# Patient Record
Sex: MALE | Race: WHITE | NOT HISPANIC OR LATINO
[De-identification: names, ages, dates, MRNs, and addresses within clinical notes are randomized per-mention and may not be internally consistent; named-entity substitution may affect disease eponyms.]

---

## 2019-09-10 ENCOUNTER — FORM ENCOUNTER (OUTPATIENT)
Age: 74
End: 2019-09-10

## 2020-01-02 ENCOUNTER — FORM ENCOUNTER (OUTPATIENT)
Age: 75
End: 2020-01-02

## 2020-01-09 ENCOUNTER — FORM ENCOUNTER (OUTPATIENT)
Age: 75
End: 2020-01-09

## 2020-04-02 ENCOUNTER — FORM ENCOUNTER (OUTPATIENT)
Age: 75
End: 2020-04-02

## 2020-12-27 ENCOUNTER — FORM ENCOUNTER (OUTPATIENT)
Age: 75
End: 2020-12-27

## 2020-12-28 ENCOUNTER — TRANSCRIPTION ENCOUNTER (OUTPATIENT)
Age: 75
End: 2020-12-28

## 2020-12-28 ENCOUNTER — APPOINTMENT (OUTPATIENT)
Dept: CT IMAGING | Facility: CLINIC | Age: 75
End: 2020-12-28
Payer: MEDICARE

## 2020-12-28 PROCEDURE — 74174 CTA ABD&PLVS W/CONTRAST: CPT | Mod: MH

## 2020-12-28 PROCEDURE — Q9967B: CUSTOM

## 2020-12-28 PROCEDURE — 82565A: CUSTOM | Mod: QW

## 2021-01-04 ENCOUNTER — FORM ENCOUNTER (OUTPATIENT)
Age: 76
End: 2021-01-04

## 2022-01-11 ENCOUNTER — FORM ENCOUNTER (OUTPATIENT)
Age: 77
End: 2022-01-11

## 2022-01-12 ENCOUNTER — APPOINTMENT (OUTPATIENT)
Dept: ULTRASOUND IMAGING | Facility: CLINIC | Age: 77
End: 2022-01-12
Payer: MEDICARE

## 2022-01-12 PROCEDURE — 76775 US EXAM ABDO BACK WALL LIM: CPT

## 2022-01-24 ENCOUNTER — FORM ENCOUNTER (OUTPATIENT)
Age: 77
End: 2022-01-24

## 2022-04-04 ENCOUNTER — FORM ENCOUNTER (OUTPATIENT)
Age: 77
End: 2022-04-04

## 2022-04-13 ENCOUNTER — APPOINTMENT (OUTPATIENT)
Dept: ORTHOPEDIC SURGERY | Facility: CLINIC | Age: 77
End: 2022-04-13
Payer: OTHER MISCELLANEOUS

## 2022-04-13 VITALS — HEIGHT: 75 IN | WEIGHT: 255 LBS | BODY MASS INDEX: 31.71 KG/M2

## 2022-04-13 DIAGNOSIS — E78.00 PURE HYPERCHOLESTEROLEMIA, UNSPECIFIED: ICD-10-CM

## 2022-04-13 PROCEDURE — 20552 NJX 1/MLT TRIGGER POINT 1/2: CPT

## 2022-04-13 PROCEDURE — 99213 OFFICE O/P EST LOW 20 MIN: CPT | Mod: 25

## 2022-04-13 PROCEDURE — 99072 ADDL SUPL MATRL&STAF TM PHE: CPT

## 2022-04-13 NOTE — HISTORY OF PRESENT ILLNESS
[Lower back] : lower back [Work related] : work related [Sudden] : sudden [8] : 8 [Burning] : burning [Shooting] : shooting [Stabbing] : stabbing [Tightness] : tightness [Tingling] : tingling [Sleep] : sleep [Nothing helps with pain getting better] : Nothing helps with pain getting better [Retired] : Work status: retired [] : yes [de-identified] : Patient Complaint - WC 3/3/95 LBP to right leg\par Pt states he feels about the same. He has good days and bad days in terms of pain. Worse with sitting/standing\par prolomged time\par 1/19/11: pt presents f/u lower back pain, radiculopathy, doing well with home therapy and use of Vicodin.\par 4/13/11 status quo. still with intermittant back pain and radiculopathy. taking norco prn.\par 8/31/11 c/o stiffness, hep\par 1/4/12: f/u lower back pain, no significant changes. Using Naprosyn PRN, walking for exercise. Pt notes he needs a knee\par replacement but is trying to strengthen it first.\par 4/4/12 f/u lbp walks 1 mile but limited by knee pain\par 8/1/12 f/u lbp, walks regularly\par 11/7/12 f/u lbp rad to R le, walks regularly. Requesting Norco 7.5/325\par 2/6/13: f/u lower back, notes cont pain. Not doing much HEP due to his knee hurting and colder weather.\par 9/11/13 f/u lbp. Had tka much improved\par 12/11/13: f/u LBP, notes cont pain, stiffness L>R. Some radicular pain into legs. Walking for exercise. Using Naprosyn for\par pain.\par 3/12/14 f/u lbp Rides bike\par 6/25/14 f/u lbp hep, takes Naprosyn\par 12/3/14 f/u lbp had 2 exacerbation 2 weeks ago getting off couch. Takes Aleve\par 10/14/15: f/u lbp. numbness right leg. medications.\par 1/27/16 f/u R sided lbp. Requesting patches.\par 5/4/16: f/u R sided low back pain, exacerbation of pain 3 wks ago that resolved. No shooting pains down the legs.\par Lidoderm not approved.\par 8/10/16 f/u lbp. 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 Requ Lidoderm\par 11/7/18 f/u LBP L > R Lidoderm/Naprosyn\par 11/13/19: f/u LBP good relief with Lidoderm patch. Prostate issues and undergoing radiation treatment\par 2/12/2020: f/u lbp.\par 7/8/2020: f/u lbp. stable.\par 10/14/2020: f/u lbp. status quo\par 1/27/21 f/u lbp no changes\par 4/21/21: f/u lbp. no changes.\par 7/14/21: f/u lbp. a couple recent flare-ups, aggravated by damp weather. No new injury. Non radiating. Lidoderm patch and Naprosyn helped. He has been trying to ride recumbent bike.\par 10/13/21: f/u lbp.\par 4/13/22: f/u lbp. 3 weeks ago he was coughing when he "threw" his back out. He continues to have persistent back pain radiating down his left leg. He saw his Vascular Surgeon and was prescribed mobic. He does not feel it is helping.  [FreeTextEntry3] : 3-3-95 [FreeTextEntry5] : back went out a few weeks ago [FreeTextEntry7] : left leg [de-identified] : getting  up in the AM

## 2022-04-13 NOTE — PROCEDURE
[Trigger point 1-2 muscle groups] : trigger point 1-2 muscle groups [Left] : of the left [Lumbar paraspinal muscle] : lumbar paraspinal muscle [Pain] : pain [Inflammation] : inflammation [Alcohol] : alcohol [Sterile technique used] : sterile technique used [___ cc    3mg] :  Betamethasone (Celestone) ~Vcc of 3mg [___ cc    1%] : Lidocaine ~Vcc of 1%  [___ cc    0.25%] : Bupivacaine (Marcaine) ~Vcc of 0.25%  [] : Patient tolerated procedure well [Previous OTC use and PT nontherapeutic] : patient has tried OTC's including aspirin, Ibuprofen, Aleve, etc or prescription NSAIDS, and/or exercises at home and/or physical therapy without satisfactory response

## 2022-04-13 NOTE — PHYSICAL EXAM
[Flexion] : flexion [Extension] : extension [] : positive straight leg raise [4___] : left dorsiflexors 4[unfilled]/5

## 2022-04-13 NOTE — WORK
[Total] : total [Does not reveal pre-existing condition(s) that may affect treatment/prognosis] : does not reveal pre-existing condition(s) that may affect treatment/prognosis [Cane] : cane [N/A] : : Not Applicable [No Rx restrictions] : No Rx restrictions. [I provided the services listed above] :  I provided the services listed above. [FreeTextEntry1] : retired disability

## 2022-04-13 NOTE — DISCUSSION/SUMMARY
[de-identified] : TPI today left paraspinal lumbar musculature\par Recommend MDP and skelaxin.\par Advised not to take any nsaids while taking MDP. \par Will get an updated mri for further evaluation of hnp/Stenosis

## 2022-05-18 ENCOUNTER — APPOINTMENT (OUTPATIENT)
Dept: ORTHOPEDIC SURGERY | Facility: CLINIC | Age: 77
End: 2022-05-18
Payer: OTHER MISCELLANEOUS

## 2022-05-18 VITALS — WEIGHT: 255 LBS | BODY MASS INDEX: 31.71 KG/M2 | HEIGHT: 75 IN

## 2022-05-18 PROCEDURE — 99214 OFFICE O/P EST MOD 30 MIN: CPT

## 2022-05-18 PROCEDURE — 99072 ADDL SUPL MATRL&STAF TM PHE: CPT

## 2022-05-18 NOTE — WORK
[Total] : total [Cannot return to work because ________] : cannot return to work because [unfilled] [FreeTextEntry1] : poor

## 2022-05-18 NOTE — HISTORY OF PRESENT ILLNESS
[Lower back] : lower back [Gradual] : gradual [Burning] : burning [Radiating] : radiating [Shooting] : shooting [Stabbing] : stabbing [Constant] : constant [Sitting] : sitting [Standing] : standing [Walking] : walking [Work related] : work related [Sudden] : sudden [8] : 8 [Tightness] : tightness [Tingling] : tingling [Sleep] : sleep [Nothing helps with pain getting better] : Nothing helps with pain getting better [Retired] : Work status: retired [] : yes [de-identified] : Patient Complaint - WC 3/3/95 LBP to right leg\par Pt states he feels about the same. He has good days and bad days in terms of pain. Worse with sitting/standing\par prolomged time\par 1/19/11: pt presents f/u lower back pain, radiculopathy, doing well with home therapy and use of Vicodin.\par 4/13/11 status quo. still with intermittant back pain and radiculopathy. taking norco prn.\par 8/31/11 c/o stiffness, hep\par 1/4/12: f/u lower back pain, no significant changes. Using Naprosyn PRN, walking for exercise. Pt notes he needs a knee\par replacement but is trying to strengthen it first.\par 4/4/12 f/u lbp walks 1 mile but limited by knee pain\par 8/1/12 f/u lbp, walks regularly\par 11/7/12 f/u lbp rad to R le, walks regularly. Requesting Norco 7.5/325\par 2/6/13: f/u lower back, notes cont pain. Not doing much HEP due to his knee hurting and colder weather.\par 9/11/13 f/u lbp. Had tka much improved\par 12/11/13: f/u LBP, notes cont pain, stiffness L>R. Some radicular pain into legs. Walking for exercise. Using Naprosyn for\par pain.\par 3/12/14 f/u lbp Rides bike\par 6/25/14 f/u lbp hep, takes Naprosyn\par 12/3/14 f/u lbp had 2 exacerbation 2 weeks ago getting off couch. Takes Aleve\par 10/14/15: f/u lbp. numbness right leg. medications.\par 1/27/16 f/u R sided lbp. Requesting patches.\par 5/4/16: f/u R sided low back pain, exacerbation of pain 3 wks ago that resolved. No shooting pains down the legs.\par Lidoderm not approved.\par 8/10/16 f/u lbp. 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 Requ Lidoderm\par 11/7/18 f/u LBP L > R Lidoderm/Naprosyn\par 11/13/19: f/u LBP good relief with Lidoderm patch. Prostate issues and undergoing radiation treatment\par 2/12/2020: f/u lbp.\par 7/8/2020: f/u lbp. stable.\par 10/14/2020: f/u lbp. status quo\par 1/27/21 f/u lbp no changes\par 4/21/21: f/u lbp. no changes.\par 7/14/21: f/u lbp. a couple recent flare-ups, aggravated by damp weather. No new injury. Non radiating. Lidoderm patch and Naprosyn helped. He has been trying to ride recumbent bike.\par 10/13/21: f/u lbp.\par 4/13/22: f/u lbp. 3 weeks ago he was coughing when he "threw" his back out. He continues to have persistent back pain radiating down his left leg. He saw his Vascular Surgeon and was prescribed mobic. He does not feel it is helping. \par 5/18/22  f/u Lbp to L ant thigh [FreeTextEntry3] : 3-3-95 [FreeTextEntry5] : back went out a few weeks ago [FreeTextEntry7] : left leg [FreeTextEntry9] : tylenol every 6 hours [de-identified] : getting  up in the AM [de-identified] : stand up MRI 5-13-22 [de-identified] : cortiosne injection helped

## 2022-05-18 NOTE — PHYSICAL EXAM
[Flexion] : flexion [Extension] : extension [4___] : left dorsiflexors 4[unfilled]/5 [] : positive straight leg raise

## 2022-05-18 NOTE — DATA REVIEWED
[MRI] : MRI [Lumbar Spine] : lumbar spine [Report was reviewed and noted in the chart] : The report was reviewed and noted in the chart [I reviewed the films/CD and agree] : I reviewed the films/CD and agree [FreeTextEntry1] : g1 spondylolisthesis L4-5, mild stenosis L3-4

## 2022-06-09 ENCOUNTER — APPOINTMENT (OUTPATIENT)
Dept: PAIN MANAGEMENT | Facility: CLINIC | Age: 77
End: 2022-06-09

## 2022-06-09 ENCOUNTER — RX RENEWAL (OUTPATIENT)
Age: 77
End: 2022-06-09

## 2022-06-13 ENCOUNTER — FORM ENCOUNTER (OUTPATIENT)
Age: 77
End: 2022-06-13

## 2022-06-14 ENCOUNTER — APPOINTMENT (OUTPATIENT)
Dept: ULTRASOUND IMAGING | Facility: CLINIC | Age: 77
End: 2022-06-14
Payer: MEDICARE

## 2022-06-14 PROCEDURE — 76775 US EXAM ABDO BACK WALL LIM: CPT

## 2022-06-20 ENCOUNTER — FORM ENCOUNTER (OUTPATIENT)
Age: 77
End: 2022-06-20

## 2022-06-27 ENCOUNTER — APPOINTMENT (OUTPATIENT)
Dept: ORTHOPEDIC SURGERY | Facility: CLINIC | Age: 77
End: 2022-06-27

## 2022-07-11 ENCOUNTER — FORM ENCOUNTER (OUTPATIENT)
Age: 77
End: 2022-07-11

## 2022-07-12 ENCOUNTER — APPOINTMENT (OUTPATIENT)
Dept: CT IMAGING | Facility: CLINIC | Age: 77
End: 2022-07-12

## 2022-07-12 PROCEDURE — 71250 CT THORAX DX C-: CPT | Mod: MH

## 2022-09-07 ENCOUNTER — APPOINTMENT (OUTPATIENT)
Dept: ORTHOPEDIC SURGERY | Facility: CLINIC | Age: 77
End: 2022-09-07

## 2022-09-07 VITALS — BODY MASS INDEX: 31.71 KG/M2 | WEIGHT: 255 LBS | HEIGHT: 75 IN

## 2022-09-07 PROCEDURE — 99072 ADDL SUPL MATRL&STAF TM PHE: CPT

## 2022-09-07 PROCEDURE — 99213 OFFICE O/P EST LOW 20 MIN: CPT

## 2022-09-07 NOTE — REVIEW OF SYSTEMS
[Joint Pain] : joint pain [Joint Stiffness] : joint stiffness [Joint Swelling] : joint swelling [Muscle Weakness] : muscle weakness [Negative] : Heme/Lymph [Decrease Hearing] : decrease hearing

## 2022-09-07 NOTE — HISTORY OF PRESENT ILLNESS
[Lower back] : lower back [Work related] : work related [Gradual] : gradual [Sudden] : sudden [Burning] : burning [Shooting] : shooting [Stabbing] : stabbing [Tightness] : tightness [Tingling] : tingling [Constant] : constant [Sleep] : sleep [Sitting] : sitting [Standing] : standing [Retired] : Work status: retired [3] : 3 [Localized] : localized [Meds] : meds [Injection therapy] : injection therapy [de-identified] : Patient Complaint - WC 3/3/95 LBP to right leg\par Pt states he feels about the same. He has good days and bad days in terms of pain. Worse with sitting/standing\par prolomged time\par 1/19/11: pt presents f/u lower back pain, radiculopathy, doing well with home therapy and use of Vicodin.\par 4/13/11 status quo. still with intermittant back pain and radiculopathy. taking norco prn.\par 8/31/11 c/o stiffness, hep\par 1/4/12: f/u lower back pain, no significant changes. Using Naprosyn PRN, walking for exercise. Pt notes he needs a knee\par replacement but is trying to strengthen it first.\par 4/4/12 f/u lbp walks 1 mile but limited by knee pain\par 8/1/12 f/u lbp, walks regularly\par 11/7/12 f/u lbp rad to R le, walks regularly. Requesting Norco 7.5/325\par 2/6/13: f/u lower back, notes cont pain. Not doing much HEP due to his knee hurting and colder weather.\par 9/11/13 f/u lbp. Had tka much improved\par 12/11/13: f/u LBP, notes cont pain, stiffness L>R. Some radicular pain into legs. Walking for exercise. Using Naprosyn for\par pain.\par 3/12/14 f/u lbp Rides bike\par 6/25/14 f/u lbp hep, takes Naprosyn\par 12/3/14 f/u lbp had 2 exacerbation 2 weeks ago getting off couch. Takes Aleve\par 10/14/15: f/u lbp. numbness right leg. medications.\par 1/27/16 f/u R sided lbp. Requesting patches.\par 5/4/16: f/u R sided low back pain, exacerbation of pain 3 wks ago that resolved. No shooting pains down the legs.\par Lidoderm not approved.\par 8/10/16 f/u lbp. 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 Requ Lidoderm\par 11/7/18 f/u LBP L > R Lidoderm/Naprosyn\par 11/13/19: f/u LBP good relief with Lidoderm patch. Prostate issues and undergoing radiation treatment\par 2/12/2020: f/u lbp.\par 7/8/2020: f/u lbp. stable.\par 10/14/2020: f/u lbp. status quo\par 1/27/21 f/u lbp no changes\par 4/21/21: f/u lbp. no changes.\par 7/14/21: f/u lbp. a couple recent flare-ups, aggravated by damp weather. No new injury. Non radiating. Lidoderm patch and Naprosyn helped. He has been trying to ride recumbent bike.\par 10/13/21: f/u lbp.\par 4/13/22: f/u lbp. 3 weeks ago he was coughing when he "threw" his back out. He continues to have persistent back pain radiating down his left leg. He saw his Vascular Surgeon and was prescribed mobic. He does not feel it is helping. \par 5/18/22  f/u Lbp to L ant thigh\par 9/7/22: f/bullard lbp. to left leg. Saw pain management and had 3 DANNY's. Now feeling much better.  also now having increasing leg swelling.  [] : no [FreeTextEntry3] : 3-3-95 [FreeTextEntry9] : motrin and naproxen occasional [de-identified] : stand up MRI 5-13-22 [de-identified] : has been treated with epidural injections and have helped

## 2022-09-07 NOTE — DISCUSSION/SUMMARY
[de-identified] : Stable at this time. \par Good response from DANNY's\par recommend compression stockings. \par Also advised to see his vascular surgeon for further evaluation. \par f/u 3 months.

## 2022-10-04 ENCOUNTER — APPOINTMENT (OUTPATIENT)
Dept: VASCULAR SURGERY | Facility: CLINIC | Age: 77
End: 2022-10-04

## 2022-10-04 ENCOUNTER — APPOINTMENT (OUTPATIENT)
Dept: ULTRASOUND IMAGING | Facility: CLINIC | Age: 77
End: 2022-10-04

## 2022-10-04 PROCEDURE — 99213 OFFICE O/P EST LOW 20 MIN: CPT

## 2022-10-04 PROCEDURE — 93971 EXTREMITY STUDY: CPT | Mod: LT

## 2022-10-04 NOTE — HISTORY OF PRESENT ILLNESS
[FreeTextEntry1] : 77-year-old gentleman is being evaluated for sudden onset of the left leg edema.  Symptoms started about 3 weeks ago.  There is no history of injury.

## 2022-10-04 NOTE — REVIEW OF SYSTEMS
[Fever] : no fever [Chills] : no chills [Lower Ext Edema] : lower extremity edema [Shortness Of Breath] : no shortness of breath [Wheezing] : no wheezing [Abdominal Pain] : no abdominal pain [Limb Pain] : limb pain [Limb Swelling] : limb swelling [Negative] : Cardiovascular [FreeTextEntry5] : Left lower leg edema

## 2022-10-04 NOTE — PHYSICAL EXAM
[JVD] : no jugular venous distention  [Carotid Bruits] : no carotid bruits [Normal Breath Sounds] : Normal breath sounds [Normal Heart Sounds] : normal heart sounds [2+] : left 2+ [Ankle Swelling (On Exam)] : present [Ankle Swelling On The Left] : of the left ankle [Ankle Swelling Bilaterally] : severe [Varicose Veins Of Lower Extremities] : not present [] : not present [Abdomen Masses] : No abdominal masses [de-identified] : No acute distress [de-identified] : Supple

## 2022-10-25 ENCOUNTER — APPOINTMENT (OUTPATIENT)
Dept: VASCULAR SURGERY | Facility: CLINIC | Age: 77
End: 2022-10-25

## 2022-10-25 VITALS
HEIGHT: 75 IN | BODY MASS INDEX: 32.5 KG/M2 | TEMPERATURE: 98.1 F | SYSTOLIC BLOOD PRESSURE: 142 MMHG | WEIGHT: 261.38 LBS | DIASTOLIC BLOOD PRESSURE: 60 MMHG

## 2022-10-25 DIAGNOSIS — Z82.49 FAMILY HISTORY OF ISCHEMIC HEART DISEASE AND OTHER DISEASES OF THE CIRCULATORY SYSTEM: ICD-10-CM

## 2022-10-25 DIAGNOSIS — Z82.3 FAMILY HISTORY OF STROKE: ICD-10-CM

## 2022-10-25 DIAGNOSIS — H91.90 UNSPECIFIED HEARING LOSS, UNSPECIFIED EAR: ICD-10-CM

## 2022-10-25 DIAGNOSIS — Z87.891 PERSONAL HISTORY OF NICOTINE DEPENDENCE: ICD-10-CM

## 2022-10-25 PROCEDURE — 99213 OFFICE O/P EST LOW 20 MIN: CPT

## 2022-10-25 RX ORDER — ROSUVASTATIN CALCIUM 10 MG/1
10 TABLET, FILM COATED ORAL DAILY
Refills: 0 | Status: ACTIVE | COMMUNITY

## 2022-10-25 NOTE — HISTORY OF PRESENT ILLNESS
[FreeTextEntry1] : Patient is follow-up for the left leg edema.  Venous ultrasound was negative for DVT.  Patient does not tolerate compression stocking.

## 2022-10-25 NOTE — REVIEW OF SYSTEMS
[Fever] : no fever [Chills] : no chills [Lower Ext Edema] : lower extremity edema [Negative] : Respiratory [FreeTextEntry5] : Left leg

## 2022-10-25 NOTE — ASSESSMENT
[FreeTextEntry1] : Patient was given prescription for Velcro compression wrap.  Follow-up appointment in 3 months.

## 2022-12-07 ENCOUNTER — APPOINTMENT (OUTPATIENT)
Dept: ORTHOPEDIC SURGERY | Facility: CLINIC | Age: 77
End: 2022-12-07

## 2022-12-07 VITALS — WEIGHT: 255 LBS | BODY MASS INDEX: 32.73 KG/M2 | HEIGHT: 74 IN

## 2022-12-07 PROCEDURE — 99072 ADDL SUPL MATRL&STAF TM PHE: CPT

## 2022-12-07 PROCEDURE — 99213 OFFICE O/P EST LOW 20 MIN: CPT

## 2022-12-07 NOTE — REVIEW OF SYSTEMS
[Joint Pain] : joint pain [Decrease Hearing] : decrease hearing [Muscle Weakness] : muscle weakness [Negative] : Heme/Lymph [Joint Stiffness] : no joint stiffness [Joint Swelling] : no joint swelling

## 2022-12-07 NOTE — HISTORY OF PRESENT ILLNESS
[Lower back] : lower back [Work related] : work related [Gradual] : gradual [Sudden] : sudden [3] : 3 [Localized] : localized [Constant] : constant [Sleep] : sleep [Injection therapy] : injection therapy [Sitting] : sitting [Standing] : standing [Retired] : Work status: retired [de-identified] : Patient Complaint - WC 3/3/95 LBP to right leg\par Pt states he feels about the same. He has good days and bad days in terms of pain. Worse with sitting/standing\par prolomged time\par 1/19/11: pt presents f/u lower back pain, radiculopathy, doing well with home therapy and use of Vicodin.\par 4/13/11 status quo. still with intermittant back pain and radiculopathy. taking norco prn.\par 8/31/11 c/o stiffness, hep\par 1/4/12: f/u lower back pain, no significant changes. Using Naprosyn PRN, walking for exercise. Pt notes he needs a knee\par replacement but is trying to strengthen it first.\par 4/4/12 f/u lbp walks 1 mile but limited by knee pain\par 8/1/12 f/u lbp, walks regularly\par 11/7/12 f/u lbp rad to R le, walks regularly. Requesting Norco 7.5/325\par 2/6/13: f/u lower back, notes cont pain. Not doing much HEP due to his knee hurting and colder weather.\par 9/11/13 f/u lbp. Had tka much improved\par 12/11/13: f/u LBP, notes cont pain, stiffness L>R. Some radicular pain into legs. Walking for exercise. Using Naprosyn for\par pain.\par 3/12/14 f/u lbp Rides bike\par 6/25/14 f/u lbp hep, takes Naprosyn\par 12/3/14 f/u lbp had 2 exacerbation 2 weeks ago getting off couch. Takes Aleve\par 10/14/15: f/u lbp. numbness right leg. medications.\par 1/27/16 f/u R sided lbp. Requesting patches.\par 5/4/16: f/u R sided low back pain, exacerbation of pain 3 wks ago that resolved. No shooting pains down the legs.\par Lidoderm not approved.\par 8/10/16 f/u lbp. 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 Requ Lidoderm\par 11/7/18 f/u LBP L > R Lidoderm/Naprosyn\par 11/13/19: f/u LBP good relief with Lidoderm patch. Prostate issues and undergoing radiation treatment\par 2/12/2020: f/u lbp.\par 7/8/2020: f/u lbp. stable.\par 10/14/2020: f/u lbp. status quo\par 1/27/21 f/u lbp no changes\par 4/21/21: f/u lbp. no changes.\par 7/14/21: f/u lbp. a couple recent flare-ups, aggravated by damp weather. No new injury. Non radiating. Lidoderm patch and Naprosyn helped. He has been trying to ride recumbent bike.\par 10/13/21: f/u lbp.\par 4/13/22: f/u lbp. 3 weeks ago he was coughing when he "threw" his back out. He continues to have persistent back pain radiating down his left leg. He saw his Vascular Surgeon and was prescribed mobic. He does not feel it is helping. \par 5/18/22  f/u Lbp to L ant thigh\par 9/7/22: f/bullard lbp. to left leg. Saw pain management and had 3 DANNY's. Now feeling much better.  also now having increasing leg swelling. \par 12/7/22: f/u lbp. Doing ok. Had 3 DANNY's by pain management with good relief.  [] : no [FreeTextEntry3] : 3-3-95 [FreeTextEntry9] : ibuprofen occasionally,recumbant bike [de-identified] : weather [de-identified] : has been treated with epidural injections and have helped

## 2023-02-14 ENCOUNTER — APPOINTMENT (OUTPATIENT)
Dept: VASCULAR SURGERY | Facility: CLINIC | Age: 78
End: 2023-02-14
Payer: MEDICARE

## 2023-02-14 VITALS
HEIGHT: 74 IN | DIASTOLIC BLOOD PRESSURE: 62 MMHG | BODY MASS INDEX: 32.73 KG/M2 | WEIGHT: 255 LBS | SYSTOLIC BLOOD PRESSURE: 138 MMHG

## 2023-02-14 DIAGNOSIS — R60.0 LOCALIZED EDEMA: ICD-10-CM

## 2023-02-14 DIAGNOSIS — Z78.9 OTHER SPECIFIED HEALTH STATUS: ICD-10-CM

## 2023-02-14 PROCEDURE — 99213 OFFICE O/P EST LOW 20 MIN: CPT

## 2023-02-14 RX ORDER — HYDROCHLOROTHIAZIDE 25 MG
25 TABLET ORAL DAILY
Refills: 0 | Status: COMPLETED | COMMUNITY
End: 2023-02-14

## 2023-02-14 RX ORDER — METAXALONE 800 MG/1
800 TABLET ORAL
Qty: 30 | Refills: 0 | Status: COMPLETED | COMMUNITY
Start: 2022-04-13 | End: 2023-02-14

## 2023-02-14 RX ORDER — METAXALONE 800 MG/1
800 TABLET ORAL 3 TIMES DAILY
Qty: 60 | Refills: 0 | Status: COMPLETED | COMMUNITY
Start: 2022-05-18 | End: 2023-02-14

## 2023-02-14 RX ORDER — METHYLPREDNISOLONE 4 MG/1
4 TABLET ORAL
Qty: 1 | Refills: 0 | Status: COMPLETED | COMMUNITY
Start: 2022-04-13 | End: 2023-02-14

## 2023-02-14 NOTE — REVIEW OF SYSTEMS
[Fever] : no fever [Chills] : no chills [Lower Ext Edema] : lower extremity edema [Negative] : Gastrointestinal [FreeTextEntry5] : Left lower extremity

## 2023-02-14 NOTE — PHYSICAL EXAM
[JVD] : no jugular venous distention  [Carotid Bruits] : no carotid bruits [Normal Breath Sounds] : Normal breath sounds [Normal Heart Sounds] : normal heart sounds [2+] : left 2+ [Right Carotid Bruit] : no bruit heard over the right carotid [Left Carotid Bruit] : no bruit heard over the left carotid [1+] : left 1+ [Ankle Swelling (On Exam)] : present [Ankle Swelling On The Left] : of the left ankle [Ankle Swelling On The Right] : mild [Varicose Veins Of Lower Extremities] : not present [] : not present [Abdomen Masses] : No abdominal masses [Abdomen Tenderness] : ~T ~M No abdominal tenderness [No Rash or Lesion] : No rash or lesion [de-identified] : Pleasant, no acute distress.  Patient is hard of hearing [de-identified] : Supple

## 2023-02-14 NOTE — HISTORY OF PRESENT ILLNESS
[FreeTextEntry1] : Patient is a follow-up for the left leg edema.  He has been wearing knee-high compression stockings with good control.  Complains about walking difficulties.  He is prone to falls

## 2023-03-15 ENCOUNTER — APPOINTMENT (OUTPATIENT)
Dept: ORTHOPEDIC SURGERY | Facility: CLINIC | Age: 78
End: 2023-03-15
Payer: OTHER MISCELLANEOUS

## 2023-03-15 VITALS — HEIGHT: 74 IN | BODY MASS INDEX: 32.73 KG/M2 | WEIGHT: 255 LBS

## 2023-03-15 DIAGNOSIS — M19.90 UNSPECIFIED OSTEOARTHRITIS, UNSPECIFIED SITE: ICD-10-CM

## 2023-03-15 PROCEDURE — 99072 ADDL SUPL MATRL&STAF TM PHE: CPT

## 2023-03-15 PROCEDURE — 99213 OFFICE O/P EST LOW 20 MIN: CPT

## 2023-03-15 NOTE — DISCUSSION/SUMMARY
[de-identified] : Encouraged HEP/Flexibility training\par Stable at this time. \par f/u 3 months.

## 2023-03-15 NOTE — PHYSICAL EXAM
[Flexion] : flexion [Extension] : extension [4___] : left dorsiflexors 4[unfilled]/5 [] : positive straight leg raise [TWNoteComboBox7] : forward flexion 15 degrees [de-identified] : extension 10 degrees [de-identified] : left lateral rotation 15 degrees [de-identified] : right lateral bending 15 degrees [TWNoteComboBox6] : right lateral rotation 15 degrees

## 2023-03-15 NOTE — HISTORY OF PRESENT ILLNESS
[Lower back] : lower back [Work related] : work related [Gradual] : gradual [Sudden] : sudden [3] : 3 [2] : 2 [Localized] : localized [Constant] : constant [Sleep] : sleep [Injection therapy] : injection therapy [Sitting] : sitting [Standing] : standing [Walking] : walking [Retired] : Work status: retired [de-identified] : Patient Complaint - WC 3/3/95 LBP to right leg\par Pt states he feels about the same. He has good days and bad days in terms of pain. Worse with sitting/standing\par prolomged time\par 1/19/11: pt presents f/u lower back pain, radiculopathy, doing well with home therapy and use of Vicodin.\par 4/13/11 status quo. still with intermittant back pain and radiculopathy. taking norco prn.\par 8/31/11 c/o stiffness, hep\par 1/4/12: f/u lower back pain, no significant changes. Using Naprosyn PRN, walking for exercise. Pt notes he needs a knee\par replacement but is trying to strengthen it first.\par 4/4/12 f/u lbp walks 1 mile but limited by knee pain\par 8/1/12 f/u lbp, walks regularly\par 11/7/12 f/u lbp rad to R le, walks regularly. Requesting Norco 7.5/325\par 2/6/13: f/u lower back, notes cont pain. Not doing much HEP due to his knee hurting and colder weather.\par 9/11/13 f/u lbp. Had tka much improved\par 12/11/13: f/u LBP, notes cont pain, stiffness L>R. Some radicular pain into legs. Walking for exercise. Using Naprosyn for\par pain.\par 3/12/14 f/u lbp Rides bike\par 6/25/14 f/u lbp hep, takes Naprosyn\par 12/3/14 f/u lbp had 2 exacerbation 2 weeks ago getting off couch. Takes Aleve\par 10/14/15: f/u lbp. numbness right leg. medications.\par 1/27/16 f/u R sided lbp. Requesting patches.\par 5/4/16: f/u R sided low back pain, exacerbation of pain 3 wks ago that resolved. No shooting pains down the legs.\par Lidoderm not approved.\par 8/10/16 f/u lbp. 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 Requ Lidoderm\par 11/7/18 f/u LBP L > R Lidoderm/Naprosyn\par 11/13/19: f/u LBP good relief with Lidoderm patch. Prostate issues and undergoing radiation treatment\par 2/12/2020: f/u lbp.\par 7/8/2020: f/u lbp. stable.\par 10/14/2020: f/u lbp. status quo\par 1/27/21 f/u lbp no changes\par 4/21/21: f/u lbp. no changes.\par 7/14/21: f/u lbp. a couple recent flare-ups, aggravated by damp weather. No new injury. Non radiating. Lidoderm patch and Naprosyn helped. He has been trying to ride recumbent bike.\par 10/13/21: f/u lbp.\par 4/13/22: f/u lbp. 3 weeks ago he was coughing when he "threw" his back out. He continues to have persistent back pain radiating down his left leg. He saw his Vascular Surgeon and was prescribed mobic. He does not feel it is helping. \par 5/18/22  f/u Lbp to L ant thigh\par 9/7/22: f/bullard lbp. to left leg. Saw pain management and had 3 DANNY's. Now feeling much better.  also now having increasing leg swelling. \par 12/7/22: f/u lbp. Doing ok. Had 3 DANNY's by pain management with good relief. \par 3/15/23: F/U LBP. He feels the same.  Siff/weather changes.  Uses patches [] : no [FreeTextEntry3] : 3-3-95 [FreeTextEntry9] : recumbant bike, lidocaine patches [de-identified] : weather [de-identified] : has been treated with epidural injections and have helped

## 2023-03-15 NOTE — REVIEW OF SYSTEMS
[Joint Pain] : joint pain [Joint Stiffness] : joint stiffness [Decrease Hearing] : decrease hearing [Muscle Weakness] : muscle weakness [Negative] : Heme/Lymph [Joint Swelling] : no joint swelling

## 2023-06-14 ENCOUNTER — APPOINTMENT (OUTPATIENT)
Dept: ORTHOPEDIC SURGERY | Facility: CLINIC | Age: 78
End: 2023-06-14
Payer: OTHER MISCELLANEOUS

## 2023-06-14 VITALS — BODY MASS INDEX: 32.73 KG/M2 | WEIGHT: 255 LBS | HEIGHT: 74 IN

## 2023-06-14 DIAGNOSIS — Z00.00 ENCOUNTER FOR GENERAL ADULT MEDICAL EXAMINATION W/OUT ABNORMAL FINDINGS: ICD-10-CM

## 2023-06-14 PROCEDURE — 99213 OFFICE O/P EST LOW 20 MIN: CPT

## 2023-06-14 NOTE — PHYSICAL EXAM
[Flexion] : flexion [Extension] : extension [4___] : left dorsiflexors 4[unfilled]/5 [] : positive straight leg raise [TWNoteComboBox7] : forward flexion 15 degrees [de-identified] : extension 10 degrees [de-identified] : left lateral rotation 15 degrees [de-identified] : right lateral bending 15 degrees [TWNoteComboBox6] : right lateral rotation 15 degrees

## 2023-06-14 NOTE — DISCUSSION/SUMMARY
[de-identified] : Encouraged HEP/Flexibility training\par Stable at this time. \par Consider Hip evaliuation

## 2023-06-14 NOTE — HISTORY OF PRESENT ILLNESS
[Lower back] : lower back [Work related] : work related [Gradual] : gradual [Sudden] : sudden [3] : 3 [2] : 2 [Constant] : constant [Sleep] : sleep [Sitting] : sitting [Standing] : standing [Retired] : Work status: retired [Radiating] : radiating [de-identified] : Patient Complaint - WC 3/3/95 LBP to right leg\par Pt states he feels about the same. He has good days and bad days in terms of pain. Worse with sitting/standing\par prolomged time\par 1/19/11: pt presents f/u lower back pain, radiculopathy, doing well with home therapy and use of Vicodin.\par 4/13/11 status quo. still with intermittant back pain and radiculopathy. taking norco prn.\par 8/31/11 c/o stiffness, hep\par 1/4/12: f/u lower back pain, no significant changes. Using Naprosyn PRN, walking for exercise. Pt notes he needs a knee\par replacement but is trying to strengthen it first.\par 4/4/12 f/u lbp walks 1 mile but limited by knee pain\par 8/1/12 f/u lbp, walks regularly\par 11/7/12 f/u lbp rad to R le, walks regularly. Requesting Norco 7.5/325\par 2/6/13: f/u lower back, notes cont pain. Not doing much HEP due to his knee hurting and colder weather.\par 9/11/13 f/u lbp. Had tka much improved\par 12/11/13: f/u LBP, notes cont pain, stiffness L>R. Some radicular pain into legs. Walking for exercise. Using Naprosyn for\par pain.\par 3/12/14 f/u lbp Rides bike\par 6/25/14 f/u lbp hep, takes Naprosyn\par 12/3/14 f/u lbp had 2 exacerbation 2 weeks ago getting off couch. Takes Aleve\par 10/14/15: f/u lbp. numbness right leg. medications.\par 1/27/16 f/u R sided lbp. Requesting patches.\par 5/4/16: f/u R sided low back pain, exacerbation of pain 3 wks ago that resolved. No shooting pains down the legs.\par Lidoderm not approved.\par 8/10/16 f/u lbp. 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 Requ Lidoderm\par 11/7/18 f/u LBP L > R Lidoderm/Naprosyn\par 11/13/19: f/u LBP good relief with Lidoderm patch. Prostate issues and undergoing radiation treatment\par 2/12/2020: f/u lbp.\par 7/8/2020: f/u lbp. stable.\par 10/14/2020: f/u lbp. status quo\par 1/27/21 f/u lbp no changes\par 4/21/21: f/u lbp. no changes.\par 7/14/21: f/u lbp. a couple recent flare-ups, aggravated by damp weather. No new injury. Non radiating. Lidoderm patch and Naprosyn helped. He has been trying to ride recumbent bike.\par 10/13/21: f/u lbp.\par 4/13/22: f/u lbp. 3 weeks ago he was coughing when he "threw" his back out. He continues to have persistent back pain radiating down his left leg. He saw his Vascular Surgeon and was prescribed mobic. He does not feel it is helping. \par 5/18/22  f/u Lbp to L ant thigh\par 9/7/22: f/bullard lbp. to left leg. Saw pain management and had 3 DANNY's. Now feeling much better.  also now having increasing leg swelling. \par 12/7/22: f/u lbp. Doing ok. Had 3 DANNY's by pain management with good relief. \par 3/15/23: F/U LBP. He feels the same.  Siff/weather changes.  Uses patches\par 6/14/23: f/u lbp. Stable. Patches as needed. Hip pain persists [] : no [FreeTextEntry3] : 3-3-95 [FreeTextEntry7] : both legs [FreeTextEntry9] : ibuprofen [de-identified] : has been treated with epidural injections and have helped

## 2023-06-21 ENCOUNTER — APPOINTMENT (OUTPATIENT)
Dept: CT IMAGING | Facility: CLINIC | Age: 78
End: 2023-06-21
Payer: MEDICARE

## 2023-06-21 PROCEDURE — 71250 CT THORAX DX C-: CPT | Mod: MH

## 2023-09-20 ENCOUNTER — APPOINTMENT (OUTPATIENT)
Dept: ORTHOPEDIC SURGERY | Facility: CLINIC | Age: 78
End: 2023-09-20
Payer: OTHER MISCELLANEOUS

## 2023-09-20 VITALS — HEIGHT: 74 IN | WEIGHT: 255 LBS | BODY MASS INDEX: 32.73 KG/M2

## 2023-09-20 DIAGNOSIS — M47.816 SPONDYLOSIS W/OUT MYELOPATHY OR RADICULOPATHY, LUMBAR REGION: ICD-10-CM

## 2023-09-20 PROCEDURE — 99213 OFFICE O/P EST LOW 20 MIN: CPT

## 2023-09-20 RX ORDER — LIDOCAINE 5% 700 MG/1
5 PATCH TOPICAL
Qty: 30 | Refills: 1 | Status: ACTIVE | COMMUNITY
Start: 2022-09-07 | End: 1900-01-01

## 2023-09-27 ENCOUNTER — APPOINTMENT (OUTPATIENT)
Dept: ORTHOPEDIC SURGERY | Facility: CLINIC | Age: 78
End: 2023-09-27

## 2023-11-01 ENCOUNTER — APPOINTMENT (OUTPATIENT)
Dept: CARDIOTHORACIC SURGERY | Facility: CLINIC | Age: 78
End: 2023-11-01
Payer: MEDICARE

## 2023-11-01 VITALS
HEART RATE: 88 BPM | DIASTOLIC BLOOD PRESSURE: 65 MMHG | RESPIRATION RATE: 16 BRPM | WEIGHT: 255 LBS | HEIGHT: 74 IN | OXYGEN SATURATION: 97 % | BODY MASS INDEX: 32.73 KG/M2 | SYSTOLIC BLOOD PRESSURE: 104 MMHG

## 2023-11-01 DIAGNOSIS — I25.10 ATHEROSCLEROTIC HEART DISEASE OF NATIVE CORONARY ARTERY W/OUT ANGINA PECTORIS: ICD-10-CM

## 2023-11-01 DIAGNOSIS — E78.5 HYPERLIPIDEMIA, UNSPECIFIED: ICD-10-CM

## 2023-11-01 PROCEDURE — 99205 OFFICE O/P NEW HI 60 MIN: CPT

## 2023-11-01 RX ORDER — METOPROLOL SUCCINATE 25 MG/1
25 TABLET, EXTENDED RELEASE ORAL DAILY
Qty: 30 | Refills: 0 | Status: ACTIVE | COMMUNITY
Start: 2023-11-01 | End: 1900-01-01

## 2023-11-08 ENCOUNTER — OUTPATIENT (OUTPATIENT)
Dept: OUTPATIENT SERVICES | Facility: HOSPITAL | Age: 78
LOS: 1 days | End: 2023-11-08
Payer: MEDICARE

## 2023-11-08 VITALS
OXYGEN SATURATION: 98 % | SYSTOLIC BLOOD PRESSURE: 120 MMHG | WEIGHT: 248.9 LBS | HEIGHT: 74 IN | RESPIRATION RATE: 18 BRPM | HEART RATE: 78 BPM | TEMPERATURE: 98 F | DIASTOLIC BLOOD PRESSURE: 60 MMHG

## 2023-11-08 DIAGNOSIS — R73.03 PREDIABETES: ICD-10-CM

## 2023-11-08 DIAGNOSIS — I10 ESSENTIAL (PRIMARY) HYPERTENSION: ICD-10-CM

## 2023-11-08 DIAGNOSIS — C61 MALIGNANT NEOPLASM OF PROSTATE: ICD-10-CM

## 2023-11-08 DIAGNOSIS — Z90.49 ACQUIRED ABSENCE OF OTHER SPECIFIED PARTS OF DIGESTIVE TRACT: Chronic | ICD-10-CM

## 2023-11-08 DIAGNOSIS — Z01.818 ENCOUNTER FOR OTHER PREPROCEDURAL EXAMINATION: ICD-10-CM

## 2023-11-08 DIAGNOSIS — I25.10 ATHEROSCLEROTIC HEART DISEASE OF NATIVE CORONARY ARTERY WITHOUT ANGINA PECTORIS: ICD-10-CM

## 2023-11-08 DIAGNOSIS — Z98.890 OTHER SPECIFIED POSTPROCEDURAL STATES: Chronic | ICD-10-CM

## 2023-11-08 DIAGNOSIS — Z96.652 PRESENCE OF LEFT ARTIFICIAL KNEE JOINT: Chronic | ICD-10-CM

## 2023-11-08 DIAGNOSIS — Z29.9 ENCOUNTER FOR PROPHYLACTIC MEASURES, UNSPECIFIED: ICD-10-CM

## 2023-11-08 DIAGNOSIS — E78.5 HYPERLIPIDEMIA, UNSPECIFIED: ICD-10-CM

## 2023-11-08 DIAGNOSIS — Z91.89 OTHER SPECIFIED PERSONAL RISK FACTORS, NOT ELSEWHERE CLASSIFIED: ICD-10-CM

## 2023-11-08 LAB
A1C WITH ESTIMATED AVERAGE GLUCOSE RESULT: 6.2 % — HIGH (ref 4–5.6)
A1C WITH ESTIMATED AVERAGE GLUCOSE RESULT: 6.2 % — HIGH (ref 4–5.6)
ALBUMIN SERPL ELPH-MCNC: 4.3 G/DL — SIGNIFICANT CHANGE UP (ref 3.3–5.2)
ALBUMIN SERPL ELPH-MCNC: 4.3 G/DL — SIGNIFICANT CHANGE UP (ref 3.3–5.2)
ALP SERPL-CCNC: 70 U/L — SIGNIFICANT CHANGE UP (ref 40–120)
ALP SERPL-CCNC: 70 U/L — SIGNIFICANT CHANGE UP (ref 40–120)
ALT FLD-CCNC: 15 U/L — SIGNIFICANT CHANGE UP
ALT FLD-CCNC: 15 U/L — SIGNIFICANT CHANGE UP
ANION GAP SERPL CALC-SCNC: 14 MMOL/L — SIGNIFICANT CHANGE UP (ref 5–17)
ANION GAP SERPL CALC-SCNC: 14 MMOL/L — SIGNIFICANT CHANGE UP (ref 5–17)
APPEARANCE UR: CLEAR — SIGNIFICANT CHANGE UP
APPEARANCE UR: CLEAR — SIGNIFICANT CHANGE UP
APTT BLD: 28.6 SEC — SIGNIFICANT CHANGE UP (ref 24.5–35.6)
APTT BLD: 28.6 SEC — SIGNIFICANT CHANGE UP (ref 24.5–35.6)
AST SERPL-CCNC: 15 U/L — SIGNIFICANT CHANGE UP
AST SERPL-CCNC: 15 U/L — SIGNIFICANT CHANGE UP
BASOPHILS # BLD AUTO: 0.08 K/UL — SIGNIFICANT CHANGE UP (ref 0–0.2)
BASOPHILS # BLD AUTO: 0.08 K/UL — SIGNIFICANT CHANGE UP (ref 0–0.2)
BASOPHILS NFR BLD AUTO: 0.9 % — SIGNIFICANT CHANGE UP (ref 0–2)
BASOPHILS NFR BLD AUTO: 0.9 % — SIGNIFICANT CHANGE UP (ref 0–2)
BILIRUB SERPL-MCNC: 0.5 MG/DL — SIGNIFICANT CHANGE UP (ref 0.4–2)
BILIRUB SERPL-MCNC: 0.5 MG/DL — SIGNIFICANT CHANGE UP (ref 0.4–2)
BILIRUB UR-MCNC: NEGATIVE — SIGNIFICANT CHANGE UP
BILIRUB UR-MCNC: NEGATIVE — SIGNIFICANT CHANGE UP
BLD GP AB SCN SERPL QL: SIGNIFICANT CHANGE UP
BLD GP AB SCN SERPL QL: SIGNIFICANT CHANGE UP
BUN SERPL-MCNC: 38.6 MG/DL — HIGH (ref 8–20)
BUN SERPL-MCNC: 38.6 MG/DL — HIGH (ref 8–20)
CALCIUM SERPL-MCNC: 9.9 MG/DL — SIGNIFICANT CHANGE UP (ref 8.4–10.5)
CALCIUM SERPL-MCNC: 9.9 MG/DL — SIGNIFICANT CHANGE UP (ref 8.4–10.5)
CHLORIDE SERPL-SCNC: 100 MMOL/L — SIGNIFICANT CHANGE UP (ref 96–108)
CHLORIDE SERPL-SCNC: 100 MMOL/L — SIGNIFICANT CHANGE UP (ref 96–108)
CO2 SERPL-SCNC: 23 MMOL/L — SIGNIFICANT CHANGE UP (ref 22–29)
CO2 SERPL-SCNC: 23 MMOL/L — SIGNIFICANT CHANGE UP (ref 22–29)
COLOR SPEC: YELLOW — SIGNIFICANT CHANGE UP
COLOR SPEC: YELLOW — SIGNIFICANT CHANGE UP
CREAT SERPL-MCNC: 1.35 MG/DL — HIGH (ref 0.5–1.3)
CREAT SERPL-MCNC: 1.35 MG/DL — HIGH (ref 0.5–1.3)
DIFF PNL FLD: NEGATIVE — SIGNIFICANT CHANGE UP
DIFF PNL FLD: NEGATIVE — SIGNIFICANT CHANGE UP
EGFR: 54 ML/MIN/1.73M2 — LOW
EGFR: 54 ML/MIN/1.73M2 — LOW
EOSINOPHIL # BLD AUTO: 0.11 K/UL — SIGNIFICANT CHANGE UP (ref 0–0.5)
EOSINOPHIL # BLD AUTO: 0.11 K/UL — SIGNIFICANT CHANGE UP (ref 0–0.5)
EOSINOPHIL NFR BLD AUTO: 1.3 % — SIGNIFICANT CHANGE UP (ref 0–6)
EOSINOPHIL NFR BLD AUTO: 1.3 % — SIGNIFICANT CHANGE UP (ref 0–6)
ESTIMATED AVERAGE GLUCOSE: 131 MG/DL — HIGH (ref 68–114)
ESTIMATED AVERAGE GLUCOSE: 131 MG/DL — HIGH (ref 68–114)
GLUCOSE SERPL-MCNC: 113 MG/DL — HIGH (ref 70–99)
GLUCOSE SERPL-MCNC: 113 MG/DL — HIGH (ref 70–99)
GLUCOSE UR QL: NEGATIVE MG/DL — SIGNIFICANT CHANGE UP
GLUCOSE UR QL: NEGATIVE MG/DL — SIGNIFICANT CHANGE UP
HCT VFR BLD CALC: 40.5 % — SIGNIFICANT CHANGE UP (ref 39–50)
HCT VFR BLD CALC: 40.5 % — SIGNIFICANT CHANGE UP (ref 39–50)
HGB BLD-MCNC: 13.3 G/DL — SIGNIFICANT CHANGE UP (ref 13–17)
HGB BLD-MCNC: 13.3 G/DL — SIGNIFICANT CHANGE UP (ref 13–17)
IMM GRANULOCYTES NFR BLD AUTO: 0.3 % — SIGNIFICANT CHANGE UP (ref 0–0.9)
IMM GRANULOCYTES NFR BLD AUTO: 0.3 % — SIGNIFICANT CHANGE UP (ref 0–0.9)
INR BLD: 0.99 RATIO — SIGNIFICANT CHANGE UP (ref 0.85–1.18)
INR BLD: 0.99 RATIO — SIGNIFICANT CHANGE UP (ref 0.85–1.18)
KETONES UR-MCNC: NEGATIVE MG/DL — SIGNIFICANT CHANGE UP
KETONES UR-MCNC: NEGATIVE MG/DL — SIGNIFICANT CHANGE UP
LEUKOCYTE ESTERASE UR-ACNC: NEGATIVE — SIGNIFICANT CHANGE UP
LEUKOCYTE ESTERASE UR-ACNC: NEGATIVE — SIGNIFICANT CHANGE UP
LYMPHOCYTES # BLD AUTO: 1.82 K/UL — SIGNIFICANT CHANGE UP (ref 1–3.3)
LYMPHOCYTES # BLD AUTO: 1.82 K/UL — SIGNIFICANT CHANGE UP (ref 1–3.3)
LYMPHOCYTES # BLD AUTO: 21.1 % — SIGNIFICANT CHANGE UP (ref 13–44)
LYMPHOCYTES # BLD AUTO: 21.1 % — SIGNIFICANT CHANGE UP (ref 13–44)
MCHC RBC-ENTMCNC: 29.9 PG — SIGNIFICANT CHANGE UP (ref 27–34)
MCHC RBC-ENTMCNC: 29.9 PG — SIGNIFICANT CHANGE UP (ref 27–34)
MCHC RBC-ENTMCNC: 32.8 GM/DL — SIGNIFICANT CHANGE UP (ref 32–36)
MCHC RBC-ENTMCNC: 32.8 GM/DL — SIGNIFICANT CHANGE UP (ref 32–36)
MCV RBC AUTO: 91 FL — SIGNIFICANT CHANGE UP (ref 80–100)
MCV RBC AUTO: 91 FL — SIGNIFICANT CHANGE UP (ref 80–100)
MONOCYTES # BLD AUTO: 0.76 K/UL — SIGNIFICANT CHANGE UP (ref 0–0.9)
MONOCYTES # BLD AUTO: 0.76 K/UL — SIGNIFICANT CHANGE UP (ref 0–0.9)
MONOCYTES NFR BLD AUTO: 8.8 % — SIGNIFICANT CHANGE UP (ref 2–14)
MONOCYTES NFR BLD AUTO: 8.8 % — SIGNIFICANT CHANGE UP (ref 2–14)
MRSA PCR RESULT.: SIGNIFICANT CHANGE UP
MRSA PCR RESULT.: SIGNIFICANT CHANGE UP
NEUTROPHILS # BLD AUTO: 5.84 K/UL — SIGNIFICANT CHANGE UP (ref 1.8–7.4)
NEUTROPHILS # BLD AUTO: 5.84 K/UL — SIGNIFICANT CHANGE UP (ref 1.8–7.4)
NEUTROPHILS NFR BLD AUTO: 67.6 % — SIGNIFICANT CHANGE UP (ref 43–77)
NEUTROPHILS NFR BLD AUTO: 67.6 % — SIGNIFICANT CHANGE UP (ref 43–77)
NITRITE UR-MCNC: NEGATIVE — SIGNIFICANT CHANGE UP
NITRITE UR-MCNC: NEGATIVE — SIGNIFICANT CHANGE UP
NT-PROBNP SERPL-SCNC: 79 PG/ML — SIGNIFICANT CHANGE UP (ref 0–300)
NT-PROBNP SERPL-SCNC: 79 PG/ML — SIGNIFICANT CHANGE UP (ref 0–300)
PH UR: 6 — SIGNIFICANT CHANGE UP (ref 5–8)
PH UR: 6 — SIGNIFICANT CHANGE UP (ref 5–8)
PLATELET # BLD AUTO: 334 K/UL — SIGNIFICANT CHANGE UP (ref 150–400)
PLATELET # BLD AUTO: 334 K/UL — SIGNIFICANT CHANGE UP (ref 150–400)
POTASSIUM SERPL-MCNC: 5.1 MMOL/L — SIGNIFICANT CHANGE UP (ref 3.5–5.3)
POTASSIUM SERPL-MCNC: 5.1 MMOL/L — SIGNIFICANT CHANGE UP (ref 3.5–5.3)
POTASSIUM SERPL-SCNC: 5.1 MMOL/L — SIGNIFICANT CHANGE UP (ref 3.5–5.3)
POTASSIUM SERPL-SCNC: 5.1 MMOL/L — SIGNIFICANT CHANGE UP (ref 3.5–5.3)
PREALB SERPL-MCNC: 34 MG/DL — SIGNIFICANT CHANGE UP (ref 18–38)
PREALB SERPL-MCNC: 34 MG/DL — SIGNIFICANT CHANGE UP (ref 18–38)
PROT SERPL-MCNC: 7.9 G/DL — SIGNIFICANT CHANGE UP (ref 6.6–8.7)
PROT SERPL-MCNC: 7.9 G/DL — SIGNIFICANT CHANGE UP (ref 6.6–8.7)
PROT UR-MCNC: NEGATIVE MG/DL — SIGNIFICANT CHANGE UP
PROT UR-MCNC: NEGATIVE MG/DL — SIGNIFICANT CHANGE UP
PROTHROM AB SERPL-ACNC: 11 SEC — SIGNIFICANT CHANGE UP (ref 9.5–13)
PROTHROM AB SERPL-ACNC: 11 SEC — SIGNIFICANT CHANGE UP (ref 9.5–13)
RBC # BLD: 4.45 M/UL — SIGNIFICANT CHANGE UP (ref 4.2–5.8)
RBC # BLD: 4.45 M/UL — SIGNIFICANT CHANGE UP (ref 4.2–5.8)
RBC # FLD: 12.6 % — SIGNIFICANT CHANGE UP (ref 10.3–14.5)
RBC # FLD: 12.6 % — SIGNIFICANT CHANGE UP (ref 10.3–14.5)
S AUREUS DNA NOSE QL NAA+PROBE: SIGNIFICANT CHANGE UP
S AUREUS DNA NOSE QL NAA+PROBE: SIGNIFICANT CHANGE UP
SODIUM SERPL-SCNC: 137 MMOL/L — SIGNIFICANT CHANGE UP (ref 135–145)
SODIUM SERPL-SCNC: 137 MMOL/L — SIGNIFICANT CHANGE UP (ref 135–145)
SP GR SPEC: 1.02 — SIGNIFICANT CHANGE UP (ref 1–1.03)
SP GR SPEC: 1.02 — SIGNIFICANT CHANGE UP (ref 1–1.03)
T3 SERPL-MCNC: 123 NG/DL — SIGNIFICANT CHANGE UP (ref 80–200)
T3 SERPL-MCNC: 123 NG/DL — SIGNIFICANT CHANGE UP (ref 80–200)
T4 AB SER-ACNC: 6.8 UG/DL — SIGNIFICANT CHANGE UP (ref 4.5–12)
T4 AB SER-ACNC: 6.8 UG/DL — SIGNIFICANT CHANGE UP (ref 4.5–12)
TSH SERPL-MCNC: 1.96 UIU/ML — SIGNIFICANT CHANGE UP (ref 0.27–4.2)
TSH SERPL-MCNC: 1.96 UIU/ML — SIGNIFICANT CHANGE UP (ref 0.27–4.2)
UROBILINOGEN FLD QL: 0.2 MG/DL — SIGNIFICANT CHANGE UP (ref 0.2–1)
UROBILINOGEN FLD QL: 0.2 MG/DL — SIGNIFICANT CHANGE UP (ref 0.2–1)
WBC # BLD: 8.64 K/UL — SIGNIFICANT CHANGE UP (ref 3.8–10.5)
WBC # BLD: 8.64 K/UL — SIGNIFICANT CHANGE UP (ref 3.8–10.5)
WBC # FLD AUTO: 8.64 K/UL — SIGNIFICANT CHANGE UP (ref 3.8–10.5)
WBC # FLD AUTO: 8.64 K/UL — SIGNIFICANT CHANGE UP (ref 3.8–10.5)

## 2023-11-08 PROCEDURE — 71046 X-RAY EXAM CHEST 2 VIEWS: CPT | Mod: 26

## 2023-11-08 PROCEDURE — 93010 ELECTROCARDIOGRAM REPORT: CPT

## 2023-11-08 NOTE — H&P PST ADULT - ASSESSMENT
77 y/o male presents today to PST pending CABG x 3 with Dr. 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  Pt. states he felt pressure, he called his wife at work and the pressure resolved, he went to [facility], and he had testing done there, there were restrictions shown he states, he was told he needs a cardiac catheterization, he was admitted, and procedure revealed 2 blockages arteries, and a cyst in the back of the heart. Pt. states he has no further CP, or SOB. Pt. states when he walks a block he feels SOB, or with activity around the house and he has to stop and rest. Denies palpitations, lightheadedness or dizziness. History of HLD, HTN, prostate cancer -s/p radiation therapy. BMI 32.0.    Pt. educated and instructed regarding all preoperative instructions and education as per policy via both verbal and written means of communication and pt. verbalized agreement and understanding.  Patient educated on surgical scrub, preadmission instructions, medical clearance and day of procedure medications, pt. verbalizes understanding and agreement.  Pt instructed to stop vitamins/supplements/herbal medications/NSAIDS for one week prior to surgery and discuss with PMD.  Pt. advised on ASA hold as per surgeon, email sent to Jhonny Connor, Agustina Hernadez and Bina Jenkins to confirm this instruction, and pt. verbalized agreement and understanding.    OPIOID RISK TOOL    ROSEANNA EACH BOX THAT APPLIES AND ADD TOTALS AT THE END    FAMILY HISTORY OF SUBSTANCE ABUSE                 FEMALE         MALE                                                Alcohol                             [  ]1 pt          [  ]3pts                                               Illegal Durgs                     [  ]2 pts        [  ]3pts                                               Rx Drugs                           [  ]4 pts        [  ]4 pts    PERSONAL HISTORY OF SUBSTANCE ABUSE                                                                                          Alcohol                             [  ]3 pts       [  ]3 pts                                               Illegal Drugs                     [  ]4 pts        [  ]4 pts                                               Rx Drugs                           [  ]5 pts        [  ]5 pts    AGE BETWEEN 16-45 YEARS                                      [  ]1 pt         [  ]1 pt    HISTORY OF PREADOLESCENT   SEXUAL ABUSE                                                             [  ]3 pts        [  ]0pts    PSYCHOLOGICAL DISEASE                     ADD, OCD, Bipolar, Schizophrenia        [  ]2 pts         [  ]2 pts                      Depression                                               [  ]1 pt           [  ]1 pt           SCORING TOTAL   (add numbers and type here)              (0)                                     A score of 3 or lower indicated LOW risk for future opioid abuse  A score of 4 to 7 indicated moderate risk for future opioid abuse  A score of 8 or higher indicates a high risk for opioid abuse    CAPRINI SCORE    AGE RELATED RISK FACTORS                                                             [ ] Age 41-60 years                                            (1 Point)  [ ] Age: 61-74 years                                           (2 Points)                 [x ] Age= 75 years                                                (3 Points)             DISEASE RELATED RISK FACTORS                                                       [ ] Edema in the lower extremities                 (1 Point)                     [ ] Varicose veins                                               (1 Point)                                 [x ] BMI > 25 Kg/m2                                            (1 Point)                                  [ ] Serious infection (ie PNA)                            (1 Point)                     [ ] Lung disease ( COPD, Emphysema)            (1 Point)                                                                          [ ] Acute myocardial infarction                         (1 Point)                  [ ] Congestive heart failure (in the previous month)  (1 Point)         [ ] Inflammatory bowel disease                            (1 Point)                  [ ] Central venous access, PICC or Port               (2 points)       (within the last month)                                                                [ ] Stroke (in the previous month)                        (5 Points)    [ x] Previous or present malignancy                       (2 points)                                                                                                                                                         HEMATOLOGY RELATED FACTORS                                                         [ ] Prior episodes of VTE                                     (3 Points)                     [ ] Positive family history for VTE                      (3 Points)                  [ ] Prothrombin 88857 A                                     (3 Points)                     [ ] Factor V Leiden                                                (3 Points)                        [ ] Lupus anticoagulants                                      (3 Points)                                                           [ ] Anticardiolipin antibodies                              (3 Points)                                                       [ ] High homocysteine in the blood                   (3 Points)                                             [ ] Other congenital or acquired thrombophilia      (3 Points)                                                [ ] Heparin induced thrombocytopenia                  (3 Points)                                        MOBILITY RELATED FACTORS  [ ] Bed rest                                                         (1 Point)  [ ] Plaster cast                                                    (2 points)  [ ] Bed bound for more than 72 hours           (2 Points)    GENDER SPECIFIC FACTORS  [ ] Pregnancy or had a baby within the last month   (1 Point)  [ ] Post-partum < 6 weeks                                   (1 Point)  [ ] Hormonal therapy  or oral contraception   (1 Point)  [ ] History of pregnancy complications              (1 point)  [ ] Unexplained or recurrent              (1 Point)    OTHER RISK FACTORS                                           (1 Point)  [ x] BMI >40, smoking, diabetes requiring insulin, chemotherapy  blood transfusions and length of surgery over 2 hours    SURGERY RELATED RISK FACTORS  [ ]  Section within the last month     (1 Point)  [ ] Minor surgery                                                  (1 Point)  [ ] Arthroscopic surgery                                       (2 Points)  [x ] Planned major surgery lasting more            (2 Points)      than 45 minutes     [ ] Elective hip or knee joint replacement       (5 points)       surgery                                                TRAUMA RELATED RISK FACTORS  [ ] Fracture of the hip, pelvis, or leg                       (5 Points)  [ ] Spinal cord injury resulting in paralysis             (5 points)       (in the previous month)    [ ] Paralysis  (less than 1 month)                             (5 Points)  [ ] Multiple Trauma within 1 month                        (5 Points)    Total Score [   9     ]    Caprini Score 0-2: Low Risk, NO VTE prophylaxis required for most patients, encourage ambulation  Caprini Score 3-6: Moderate Risk , pharmacologic VTE prophylaxis is indicated for most patients (in the absence of contraindications)  Caprini Score Greater than or =7: High risk, pharmocologic VTE prophylaxis indicated for most patients (in the absence of contraindications)                                 79 y/o male presents today to PST pending CABG x 3 with Dr. 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  Pt. states he felt pressure, he called his wife at work and the pressure resolved, he went to [facility], and he had testing done there, there were restrictions shown he states, he was told he needs a cardiac catheterization, he was admitted, and procedure revealed 2 blockages arteries, and a cyst in the back of the heart. Pt. states he has no further CP, or SOB. Pt. states when he walks a block he feels SOB, or with activity around the house and he has to stop and rest. Denies palpitations, lightheadedness or dizziness. History of HLD, HTN, prostate cancer -s/p radiation therapy. BMI 32.0.    Pt. educated and instructed regarding all preoperative instructions and education as per policy via both verbal and written means of communication and pt. verbalized agreement and understanding.  Patient educated on surgical scrub, preadmission instructions, medical clearance and day of procedure medications, pt. verbalizes understanding and agreement.  Pt instructed to stop vitamins/supplements/herbal medications/NSAIDS for one week prior to surgery and discuss with PMD.  Pt. advised on ASA hold as per surgeon, email sent to Jhonny Connor, Agustina Hernadez and Bina Jenkins to confirm this instruction, and pt. verbalized agreement and understanding.  23 LM with pt. with PST phone number requesting call back to discuss A1C as documented, instructions per cardiac Np Jhonny Connor to continue ASA for procedure. Justin WALTON, [facility]   OPIOID RISK TOOL    ROSEANNA EACH BOX THAT APPLIES AND ADD TOTALS AT THE END    FAMILY HISTORY OF SUBSTANCE ABUSE                 FEMALE         MALE                                                Alcohol                             [  ]1 pt          [  ]3pts                                               Illegal Durgs                     [  ]2 pts        [  ]3pts                                               Rx Drugs                           [  ]4 pts        [  ]4 pts    PERSONAL HISTORY OF SUBSTANCE ABUSE                                                                                          Alcohol                             [  ]3 pts       [  ]3 pts                                               Illegal Drugs                     [  ]4 pts        [  ]4 pts                                               Rx Drugs                           [  ]5 pts        [  ]5 pts    AGE BETWEEN 16-45 YEARS                                      [  ]1 pt         [  ]1 pt    HISTORY OF PREADOLESCENT   SEXUAL ABUSE                                                             [  ]3 pts        [  ]0pts    PSYCHOLOGICAL DISEASE                     ADD, OCD, Bipolar, Schizophrenia        [  ]2 pts         [  ]2 pts                      Depression                                               [  ]1 pt           [  ]1 pt           SCORING TOTAL   (add numbers and type here)              (0)                                     A score of 3 or lower indicated LOW risk for future opioid abuse  A score of 4 to 7 indicated moderate risk for future opioid abuse  A score of 8 or higher indicates a high risk for opioid abuse    CAPRINI SCORE    AGE RELATED RISK FACTORS                                                             [ ] Age 41-60 years                                            (1 Point)  [ ] Age: 61-74 years                                           (2 Points)                 [x ] Age= 75 years                                                (3 Points)             DISEASE RELATED RISK FACTORS                                                       [ ] Edema in the lower extremities                 (1 Point)                     [ ] Varicose veins                                               (1 Point)                                 [x ] BMI > 25 Kg/m2                                            (1 Point)                                  [ ] Serious infection (ie PNA)                            (1 Point)                     [ ] Lung disease ( COPD, Emphysema)            (1 Point)                                                                          [ ] Acute myocardial infarction                         (1 Point)                  [ ] Congestive heart failure (in the previous month)  (1 Point)         [ ] Inflammatory bowel disease                            (1 Point)                  [ ] Central venous access, PICC or Port               (2 points)       (within the last month)                                                                [ ] Stroke (in the previous month)                        (5 Points)    [ x] Previous or present malignancy                       (2 points)                                                                                                                                                         HEMATOLOGY RELATED FACTORS                                                         [ ] Prior episodes of VTE                                     (3 Points)                     [ ] Positive family history for VTE                      (3 Points)                  [ ] Prothrombin 70999 A                                     (3 Points)                     [ ] Factor V Leiden                                                (3 Points)                        [ ] Lupus anticoagulants                                      (3 Points)                                                           [ ] Anticardiolipin antibodies                              (3 Points)                                                       [ ] High homocysteine in the blood                   (3 Points)                                             [ ] Other congenital or acquired thrombophilia      (3 Points)                                                [ ] Heparin induced thrombocytopenia                  (3 Points)                                        MOBILITY RELATED FACTORS  [ ] Bed rest                                                         (1 Point)  [ ] Plaster cast                                                    (2 points)  [ ] Bed bound for more than 72 hours           (2 Points)    GENDER SPECIFIC FACTORS  [ ] Pregnancy or had a baby within the last month   (1 Point)  [ ] Post-partum < 6 weeks                                   (1 Point)  [ ] Hormonal therapy  or oral contraception   (1 Point)  [ ] History of pregnancy complications              (1 point)  [ ] Unexplained or recurrent              (1 Point)    OTHER RISK FACTORS                                           (1 Point)  [ x] BMI >40, smoking, diabetes requiring insulin, chemotherapy  blood transfusions and length of surgery over 2 hours    SURGERY RELATED RISK FACTORS  [ ]  Section within the last month     (1 Point)  [ ] Minor surgery                                                  (1 Point)  [ ] Arthroscopic surgery                                       (2 Points)  [x ] Planned major surgery lasting more            (2 Points)      than 45 minutes     [ ] Elective hip or knee joint replacement       (5 points)       surgery                                                TRAUMA RELATED RISK FACTORS  [ ] Fracture of the hip, pelvis, or leg                       (5 Points)  [ ] Spinal cord injury resulting in paralysis             (5 points)       (in the previous month)    [ ] Paralysis  (less than 1 month)                             (5 Points)  [ ] Multiple Trauma within 1 month                        (5 Points)    Total Score [   9     ]    Caprini Score 0-2: Low Risk, NO VTE prophylaxis required for most patients, encourage ambulation  Caprini Score 3-6: Moderate Risk , pharmacologic VTE prophylaxis is indicated for most patients (in the absence of contraindications)  Caprini Score Greater than or =7: High risk, pharmocologic VTE prophylaxis indicated for most patients (in the absence of contraindications)                                 77 y/o male presents today to PST pending CABG x 3 with Dr. 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  Pt. states he felt pressure, he called his wife at work and the pressure resolved, he went to [facility], and he had testing done there, there were restrictions shown he states, he was told he needs a cardiac catheterization, he was admitted, and procedure revealed 2 blockages arteries, and a cyst in the back of the heart. Pt. states he has no further CP, or SOB. Pt. states when he walks a block he feels SOB, or with activity around the house and he has to stop and rest. Denies palpitations, lightheadedness or dizziness. History of HLD, HTN, prostate cancer -s/p radiation therapy. BMI 32.0.    Pt. educated and instructed regarding all preoperative instructions and education as per policy via both verbal and written means of communication and pt. verbalized agreement and understanding.  Patient educated on surgical scrub, preadmission instructions, medical clearance and day of procedure medications, pt. verbalizes understanding and agreement.  Pt instructed to stop vitamins/supplements/herbal medications/NSAIDS for one week prior to surgery and discuss with PMD.  Pt. advised on ASA hold as per surgeon, email sent to Jhonny Connor, Agustina Hernadez and Bina Jenkins to confirm this instruction, and pt. verbalized agreement and understanding.  23 LM with pt. with [facility] phone number requesting call back to discuss A1C as documented, instructions per cardiac Np Jhonny Connor to continue ASA for procedure. Justin WALTON, [facility]   23 14:09 Received call back from pt., A1C results discussed with pt. and he advised to f/u with PCP, pt. advised to continue Aspirin for procedure, and pt. verbalized agreement and understanding. Repeat BMP ordered for DOS. Justin WALTON, ROBBY     OPIOID RISK TOOL    ROSEANNA EACH BOX THAT APPLIES AND ADD TOTALS AT THE END    FAMILY HISTORY OF SUBSTANCE ABUSE                 FEMALE         MALE                                                Alcohol                             [  ]1 pt          [  ]3pts                                               Illegal Durgs                     [  ]2 pts        [  ]3pts                                               Rx Drugs                           [  ]4 pts        [  ]4 pts    PERSONAL HISTORY OF SUBSTANCE ABUSE                                                                                          Alcohol                             [  ]3 pts       [  ]3 pts                                               Illegal Drugs                     [  ]4 pts        [  ]4 pts                                               Rx Drugs                           [  ]5 pts        [  ]5 pts    AGE BETWEEN 16-45 YEARS                                      [  ]1 pt         [  ]1 pt    HISTORY OF PREADOLESCENT   SEXUAL ABUSE                                                             [  ]3 pts        [  ]0pts    PSYCHOLOGICAL DISEASE                     ADD, OCD, Bipolar, Schizophrenia        [  ]2 pts         [  ]2 pts                      Depression                                               [  ]1 pt           [  ]1 pt           SCORING TOTAL   (add numbers and type here)              (0)                                     A score of 3 or lower indicated LOW risk for future opioid abuse  A score of 4 to 7 indicated moderate risk for future opioid abuse  A score of 8 or higher indicates a high risk for opioid abuse    CAPRINI SCORE    AGE RELATED RISK FACTORS                                                             [ ] Age 41-60 years                                            (1 Point)  [ ] Age: 61-74 years                                           (2 Points)                 [x ] Age= 75 years                                                (3 Points)             DISEASE RELATED RISK FACTORS                                                       [ ] Edema in the lower extremities                 (1 Point)                     [ ] Varicose veins                                               (1 Point)                                 [x ] BMI > 25 Kg/m2                                            (1 Point)                                  [ ] Serious infection (ie PNA)                            (1 Point)                     [ ] Lung disease ( COPD, Emphysema)            (1 Point)                                                                          [ ] Acute myocardial infarction                         (1 Point)                  [ ] Congestive heart failure (in the previous month)  (1 Point)         [ ] Inflammatory bowel disease                            (1 Point)                  [ ] Central venous access, PICC or Port               (2 points)       (within the last month)                                                                [ ] Stroke (in the previous month)                        (5 Points)    [ x] Previous or present malignancy                       (2 points)                                                                                                                                                         HEMATOLOGY RELATED FACTORS                                                         [ ] Prior episodes of VTE                                     (3 Points)                     [ ] Positive family history for VTE                      (3 Points)                  [ ] Prothrombin 55489 A                                     (3 Points)                     [ ] Factor V Leiden                                                (3 Points)                        [ ] Lupus anticoagulants                                      (3 Points)                                                           [ ] Anticardiolipin antibodies                              (3 Points)                                                       [ ] High homocysteine in the blood                   (3 Points)                                             [ ] Other congenital or acquired thrombophilia      (3 Points)                                                [ ] Heparin induced thrombocytopenia                  (3 Points)                                        MOBILITY RELATED FACTORS  [ ] Bed rest                                                         (1 Point)  [ ] Plaster cast                                                    (2 points)  [ ] Bed bound for more than 72 hours           (2 Points)    GENDER SPECIFIC FACTORS  [ ] Pregnancy or had a baby within the last month   (1 Point)  [ ] Post-partum < 6 weeks                                   (1 Point)  [ ] Hormonal therapy  or oral contraception   (1 Point)  [ ] History of pregnancy complications              (1 point)  [ ] Unexplained or recurrent              (1 Point)    OTHER RISK FACTORS                                           (1 Point)  [ x] BMI >40, smoking, diabetes requiring insulin, chemotherapy  blood transfusions and length of surgery over 2 hours    SURGERY RELATED RISK FACTORS  [ ]  Section within the last month     (1 Point)  [ ] Minor surgery                                                  (1 Point)  [ ] Arthroscopic surgery                                       (2 Points)  [x ] Planned major surgery lasting more            (2 Points)      than 45 minutes     [ ] Elective hip or knee joint replacement       (5 points)       surgery                                                TRAUMA RELATED RISK FACTORS  [ ] Fracture of the hip, pelvis, or leg                       (5 Points)  [ ] Spinal cord injury resulting in paralysis             (5 points)       (in the previous month)    [ ] Paralysis  (less than 1 month)                             (5 Points)  [ ] Multiple Trauma within 1 month                        (5 Points)    Total Score [   9     ]    Caprini Score 0-2: Low Risk, NO VTE prophylaxis required for most patients, encourage ambulation  Caprini Score 3-6: Moderate Risk , pharmacologic VTE prophylaxis is indicated for most patients (in the absence of contraindications)  Caprini Score Greater than or =7: High risk, pharmocologic VTE prophylaxis indicated for most patients (in the absence of contraindications)                                 77 y/o male presents today to PST pending CABG x 3 with Dr. 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  Pt. states he felt pressure, he called his wife at work and the pressure resolved, he went to [facility], and he had testing done there, there were restrictions shown he states, he was told he needs a cardiac catheterization, he was admitted, and procedure revealed 2 blockages arteries, and a cyst in the back of the heart. Pt. states he has no further CP, or SOB. Pt. states when he walks a block he feels SOB, or with activity around the house and he has to stop and rest. Denies palpitations, lightheadedness or dizziness. History of HLD, HTN, prostate cancer -s/p radiation therapy. BMI 32.0.    Pt. educated and instructed regarding all preoperative instructions and education as per policy via both verbal and written means of communication and pt. verbalized agreement and understanding.  Patient educated on surgical scrub, preadmission instructions, medical clearance and day of procedure medications, pt. verbalizes understanding and agreement.  Pt instructed to stop vitamins/supplements/herbal medications/NSAIDS for one week prior to surgery and discuss with PMD.  Pt. advised on ASA hold as per surgeon, email sent to Jhonny Connor, Agustina Hernadez and Bina Jenkins to confirm this instruction, and pt. verbalized agreement and understanding.  23 LM with pt. with PST phone number requesting call back to discuss A1C as documented, instructions per cardiac Np Jhonny Connor to continue ASA for procedure. Justin WALTON, FNP-BC   23 14:09 Received call back from pt., A1C results discussed with pt. and he advised to f/u with PCP, pt. advised to continue Aspirin for procedure, and pt. verbalized agreement and understanding. Repeat BMP, and a finger stick ordered for DOS. Justin WALTON, FNP-BC     OPIOID RISK TOOL    ROSEANNA EACH BOX THAT APPLIES AND ADD TOTALS AT THE END    FAMILY HISTORY OF SUBSTANCE ABUSE                 FEMALE         MALE                                                Alcohol                             [  ]1 pt          [  ]3pts                                               Illegal Durgs                     [  ]2 pts        [  ]3pts                                               Rx Drugs                           [  ]4 pts        [  ]4 pts    PERSONAL HISTORY OF SUBSTANCE ABUSE                                                                                          Alcohol                             [  ]3 pts       [  ]3 pts                                               Illegal Drugs                     [  ]4 pts        [  ]4 pts                                               Rx Drugs                           [  ]5 pts        [  ]5 pts    AGE BETWEEN 16-45 YEARS                                      [  ]1 pt         [  ]1 pt    HISTORY OF PREADOLESCENT   SEXUAL ABUSE                                                             [  ]3 pts        [  ]0pts    PSYCHOLOGICAL DISEASE                     ADD, OCD, Bipolar, Schizophrenia        [  ]2 pts         [  ]2 pts                      Depression                                               [  ]1 pt           [  ]1 pt           SCORING TOTAL   (add numbers and type here)              (0)                                     A score of 3 or lower indicated LOW risk for future opioid abuse  A score of 4 to 7 indicated moderate risk for future opioid abuse  A score of 8 or higher indicates a high risk for opioid abuse    CAPRINI SCORE    AGE RELATED RISK FACTORS                                                             [ ] Age 41-60 years                                            (1 Point)  [ ] Age: 61-74 years                                           (2 Points)                 [x ] Age= 75 years                                                (3 Points)             DISEASE RELATED RISK FACTORS                                                       [ ] Edema in the lower extremities                 (1 Point)                     [ ] Varicose veins                                               (1 Point)                                 [x ] BMI > 25 Kg/m2                                            (1 Point)                                  [ ] Serious infection (ie PNA)                            (1 Point)                     [ ] Lung disease ( COPD, Emphysema)            (1 Point)                                                                          [ ] Acute myocardial infarction                         (1 Point)                  [ ] Congestive heart failure (in the previous month)  (1 Point)         [ ] Inflammatory bowel disease                            (1 Point)                  [ ] Central venous access, PICC or Port               (2 points)       (within the last month)                                                                [ ] Stroke (in the previous month)                        (5 Points)    [ x] Previous or present malignancy                       (2 points)                                                                                                                                                         HEMATOLOGY RELATED FACTORS                                                         [ ] Prior episodes of VTE                                     (3 Points)                     [ ] Positive family history for VTE                      (3 Points)                  [ ] Prothrombin 12271 A                                     (3 Points)                     [ ] Factor V Leiden                                                (3 Points)                        [ ] Lupus anticoagulants                                      (3 Points)                                                           [ ] Anticardiolipin antibodies                              (3 Points)                                                       [ ] High homocysteine in the blood                   (3 Points)                                             [ ] Other congenital or acquired thrombophilia      (3 Points)                                                [ ] Heparin induced thrombocytopenia                  (3 Points)                                        MOBILITY RELATED FACTORS  [ ] Bed rest                                                         (1 Point)  [ ] Plaster cast                                                    (2 points)  [ ] Bed bound for more than 72 hours           (2 Points)    GENDER SPECIFIC FACTORS  [ ] Pregnancy or had a baby within the last month   (1 Point)  [ ] Post-partum < 6 weeks                                   (1 Point)  [ ] Hormonal therapy  or oral contraception   (1 Point)  [ ] History of pregnancy complications              (1 point)  [ ] Unexplained or recurrent              (1 Point)    OTHER RISK FACTORS                                           (1 Point)  [ x] BMI >40, smoking, diabetes requiring insulin, chemotherapy  blood transfusions and length of surgery over 2 hours    SURGERY RELATED RISK FACTORS  [ ]  Section within the last month     (1 Point)  [ ] Minor surgery                                                  (1 Point)  [ ] Arthroscopic surgery                                       (2 Points)  [x ] Planned major surgery lasting more            (2 Points)      than 45 minutes     [ ] Elective hip or knee joint replacement       (5 points)       surgery                                                TRAUMA RELATED RISK FACTORS  [ ] Fracture of the hip, pelvis, or leg                       (5 Points)  [ ] Spinal cord injury resulting in paralysis             (5 points)       (in the previous month)    [ ] Paralysis  (less than 1 month)                             (5 Points)  [ ] Multiple Trauma within 1 month                        (5 Points)    Total Score [   9     ]    Caprini Score 0-2: Low Risk, NO VTE prophylaxis required for most patients, encourage ambulation  Caprini Score 3-6: Moderate Risk , pharmacologic VTE prophylaxis is indicated for most patients (in the absence of contraindications)  Caprini Score Greater than or =7: High risk, pharmocologic VTE prophylaxis indicated for most patients (in the absence of contraindications)

## 2023-11-08 NOTE — H&P PST ADULT - NSICDXPASTMEDICALHX_GEN_ALL_CORE_FT
PAST MEDICAL HISTORY:  Arteriosclerotic heart disease (ASHD)     COVID-19 vaccine series completed     H/o Lyme disease     History of radiation therapy     [place] (hard of hearing)     Hyperlipidemia     Hypertension     Liver mass     Prostate cancer      PAST MEDICAL HISTORY:  Arteriosclerotic heart disease (ASHD)     COVID-19 vaccine series completed     Former smoker     H/o Lyme disease     H/O varicose veins     History of radiation therapy     [place] (hard of hearing)     Hyperlipidemia     Hypertension     Liver mass     Lumbar herniated disc     Prostate cancer

## 2023-11-08 NOTE — H&P PST ADULT - HISTORY OF PRESENT ILLNESS
79 y/o male presents today to PST pending CABG x 3 with Dr. Tony Henao secondary to ASHD Native coronary artery w/o Angina Pectoris on 11/14/23.  Pt. states he felt pressure, he called his wife at work and the pressure resolved, he went to [facility], and he had testing done there, there were restrictions shown he states, he was told he needs a cardiac catheterization, he was admitted, and procedure revealed 2 blockages arteries, and a cyst in the back of the heart. Pt. states he has no further CP, or SOB. Pt. states when he walks a block he feels SOB, or with activity around the house and he has to stop and rest. Denies palpitations, lightheadedness or dizziness. History of HLD, HTN, prostate cancer -s/p radiation therapy.

## 2023-11-08 NOTE — H&P PST ADULT - GASTROINTESTINAL
normal/soft/nontender/nondistended/normal active bowel sounds/no guarding/no rigidity/no organomegaly/no palpable dimitry/no masses palpable negative

## 2023-11-08 NOTE — H&P PST ADULT - FUNCTIONAL STATUS
Admits to SOB with mild to moderate physical exertion. Denies CP with mild to moderate physical exertion./less than 4 METS

## 2023-11-08 NOTE — H&P PST ADULT - PROBLEM SELECTOR PLAN 4
CABG x 3 with Dr. Tony Henao secondary to ASHD Native coronary artery w/o Angina Pectoris on 11/14/23.

## 2023-11-08 NOTE — H&P PST ADULT - NSHP PST DIAGOTHER LIST_GEN_A_CORE
11/8/23 13:54 All available labs noted as documented. All abnormal labs noted as documented and have been faxed to PCP and emailed to Np Jhonny Connor, Agustina Hernadez and Bina Jenkins T&S, CXR and urine culture pending.  Justin MS, FNP-BC

## 2023-11-08 NOTE — H&P PST ADULT - NSICDXFAMILYHX_GEN_ALL_CORE_FT
FAMILY HISTORY:  Father  Still living? No  FH: stroke, Age at diagnosis: Age Unknown    Grandparent  Still living? No  FH: type 2 diabetes, Age at diagnosis: Age Unknown

## 2023-11-08 NOTE — H&P PST ADULT - NSICDXPASTSURGICALHX_GEN_ALL_CORE_FT
PAST SURGICAL HISTORY:  H/O removal of cyst     H/O resection of liver     H/O total knee replacement, left

## 2023-11-08 NOTE — H&P PST ADULT - PAIN ASSESSMENT COMPLETED
EMERGENCY DEPARTMENT HISTORY AND PHYSICAL EXAM    12:41 PM      Date: 4/12/2022  Patient Name: Hugh Allen    History of Presenting Illness     Chief Complaint   Patient presents with    Medication Refill         History Provided By: Patient    Additional History (Context): Hugh Allen is a 53 y.o. female with hx of anxiety, schizoaffective d/o, and other noted PMH who presents with c/o medication refill. Pt is requesting a refill on her Sertraline, 2 blood pressure medications. Pt notes she has been out of her blood pressure medication x 1 day, her psychiatric medication x 1 week. Pt denies SI, HI, visual or auditory hallucinations. Notes she has follow-up scheduled with psychiatrist on 4/21. Pt declines speaking with crisis worker today. PCP: Patty Cedeño MD    Current Outpatient Medications   Medication Sig Dispense Refill    irbesartan (AVAPRO) 300 mg tablet Take 1 Tablet by mouth nightly. 30 Tablet 0    triamterene-hydroCHLOROthiazide (DYAZIDE) 37.5-25 mg per capsule Take 1 Capsule by mouth daily. 30 Capsule 0    ziprasidone (GEODON) 20 mg capsule Take 1 Capsule by mouth nightly. 7 Capsule 0    sertraline (ZOLOFT) 100 mg tablet Take 1 Tablet by mouth daily. 7 Tablet 0    methylPREDNISolone (MEDROL DOSEPACK) 4 mg tablet TAKE 6 TABLETS ON DAY 1 AS DIRECTED ON PACKAGE AND DECREASE BY 1 TAB EACH DAY FOR A TOTAL OF 6 DAYS      naproxen (NAPROSYN) 500 mg tablet TAKE 1 TABLET BY MOUTH EVERY 12 HOURS WITH FOOD OR MILK AS NEEDED FOR 7 DAYS      triamcinolone acetonide (KENALOG) 0.1 % topical cream APPLY 1 APPLICATION EXTERNALLY TWICE A DAY FOR 7 DAYS      ciclopirox (PENLAC) 8 % solution Apply  to affected area daily.  ondansetron (ZOFRAN ODT) 4 mg disintegrating tablet Take 4 mg by mouth every eight (8) hours as needed.       pregabalin (LYRICA) 100 mg capsule One po qhs x 1 week, then one po bid thereafter 60 Capsule 1    Compression Socks, Large misc 2 Each by Does Not Apply route daily. 2 Each 0    simvastatin (ZOCOR) 20 mg tablet Take 20 mg by mouth nightly.  Linzess 290 mcg cap capsule Take 290 mcg by mouth as needed.  pantoprazole (PROTONIX) 40 mg tablet Take 40 mg by mouth daily as needed.  cyanocobalamin (Vitamin B-12) 1,000 mcg tablet Take 1,000 mcg by mouth daily.  hydrOXYzine pamoate (VistariL) 25 mg capsule Take 25 mg by mouth three (3) times daily as needed.  traZODone (DESYREL) 50 mg tablet Take 75 mg by mouth nightly.  lidocaine (LIDODERM) 5 % APPLY 1 PATCHES DAILY TO AFFECTED AREAS AS NEEDED FOR PAIN. 12 HOURS ON 12 HOURS OFF      ciclopirox (LOPROX) 0.77 % topical cream APPLY TO AFFECTED AREA TWICE A DAY      Amitiza 24 mcg capsule Take 24 mcg by mouth daily (with breakfast). Indications: as needed      albuterol (PROVENTIL HFA, VENTOLIN HFA, PROAIR HFA) 90 mcg/actuation inhaler Take 1-2 Puffs by inhalation every six (6) hours as needed.  multivitamin (ONE A DAY) tablet Take 1 Tab by mouth daily.  pyridoxine HCl, vitamin B6, (VITAMIN B-6 PO) Take 1 Tablet by mouth daily.  fish oil-omega-3 fatty acids 340-1,000 mg capsule Take 1 Capsule by mouth daily.  FIBER, PSYLLIUM HUSK, PO Take  by mouth daily as needed.  Ascorbic Acid-Multivits-Min (EMERGEN-C) 1,000 mg pwep Take 1,000 mg by mouth daily.  zinc 50 mg tab tablet Take 50 mg by mouth daily.          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 Social History:  Social History     Tobacco Use    Smoking status: Former Smoker     Quit date: 11/27/2011     Years since quitting: 10.3    Smokeless tobacco: Never Used   Vaping Use    Vaping Use: Never used   Substance Use Topics    Alcohol use: Yes     Comment: special occasion    Drug use: No       Allergies: Allergies   Allergen Reactions    Seafood Hives and Swelling    Iodine Other (comments)    Iodinated Contrast Media Swelling    Nsaids (Non-Steroidal Anti-Inflammatory Drug) Other (comments)    Shellfish Derived Angioedema         Review of Systems       Review of Systems   Constitutional: Negative for chills and fever. Respiratory: Negative for shortness of breath. Cardiovascular: Negative for chest pain. Gastrointestinal: Negative for abdominal pain, nausea and vomiting. Skin: Negative for rash. Neurological: Negative for weakness. All other systems reviewed and are negative. Physical Exam     Visit Vitals  BP (!) 133/99 (BP 1 Location: Left upper arm, BP Patient Position: Sitting)   Pulse 81   Temp 98.4 °F (36.9 °C)   Resp 18   Ht 5' (1.524 m)   Wt 104.3 kg (230 lb)   SpO2 100%   BMI 44.92 kg/m²         Physical Exam  Vitals and nursing note reviewed. Constitutional:       General: She is not in acute distress. Appearance: She is well-developed. She is not diaphoretic. HENT:      Head: Normocephalic and atraumatic. Cardiovascular:      Rate and Rhythm: Normal rate and regular rhythm. Heart sounds: Normal heart sounds. No murmur heard. No friction rub. No gallop.     Pulmonary:      Effort: Pulmonary effort is normal. No respiratory distress. Breath sounds: Normal breath sounds. No wheezing or rales. Musculoskeletal:         General: Normal range of motion. Cervical back: Normal range of motion and neck supple. Skin:     General: Skin is warm. Findings: No rash. Neurological:      Mental Status: She is alert. Psychiatric:         Attention and Perception: Attention normal.         Mood and Affect: Affect is tearful (pt notes she is frustrated). Speech: Speech normal.         Behavior: Behavior normal.         Thought Content: Thought content does not include homicidal or suicidal ideation. Diagnostic Study Results     Labs -  No results found for this or any previous visit (from the past 12 hour(s)). Radiologic Studies -   No orders to display         Medical Decision Making   I am the first provider for this patient. I reviewed the vital signs, available nursing notes, past medical history, past surgical history, family history and social history. Vital Signs-Reviewed the patient's vital signs. Records Reviewed: Nursing Notes and Old Medical Records (Time of Review: 12:41 PM)    ED Course: Progress Notes, Reevaluation, and Consults:  12:41 PM  Reviewed plan with patient. Offered labs, UDS, crisis assessment. Pt declines, \"I just want my prescriptions and want to get out of here\". Discussed need for close outpatient follow-up this week for reassessment. Discussed strict return precautions, including SI, HI, or any other medical concerns. 1:08 PM: Upon discharge patient requesting dose of medication in the ED. Requesting prescriptions be sent to [facility], transmitted electronically. Discussed with patient, offered dose of medication in the ED, pt became upset and left. Provider Notes (Medical Decision Making): 45-year-old female who presents to the ED for medication refill of blood pressure medication and psychiatric medication. Patient denies any concerns or complaints.   Denies SI, HI, visual or auditory hallucinations. Medications transmitted electronically to pharmacy. Strict return precautions provided. Diagnosis     Clinical Impression:   1. Medication refill        Disposition: home     Follow-up Information     Follow up With Specialties Details Why [address]    [facility] EMERGENCY DEPT Emergency Medicine  If symptoms worsen [address]  [phone]    Raffy Johnson MD Family Medicine Schedule an appointment as soon as possible for a visit   [facility] [place]   [phone]             Patient's Medications   Start Taking    IRBESARTAN (AVAPRO) 300 MG TABLET    Take 1 Tablet by mouth nightly. SERTRALINE (ZOLOFT) 100 MG TABLET    Take 1 Tablet by mouth daily. TRIAMTERENE-HYDROCHLOROTHIAZIDE (DYAZIDE) 37.5-25 MG PER CAPSULE    Take 1 Capsule by mouth daily. ZIPRASIDONE (GEODON) 20 MG CAPSULE    Take 1 Capsule by mouth nightly. Continue Taking    ALBUTEROL (PROVENTIL HFA, VENTOLIN HFA, PROAIR HFA) 90 MCG/ACTUATION INHALER    Take 1-2 Puffs by inhalation every six (6) hours as needed. AMITIZA 24 MCG CAPSULE    Take 24 mcg by mouth daily (with breakfast). Indications: as needed    ASCORBIC ACID-MULTIVITS-MIN (EMERGEN-C) 1,000 MG PWEP    Take 1,000 mg by mouth daily. CICLOPIROX (LOPROX) 0.77 % TOPICAL CREAM    APPLY TO AFFECTED AREA TWICE A DAY    CICLOPIROX (PENLAC) 8 % SOLUTION    Apply  to affected area daily. COMPRESSION SOCKS, LARGE MISC    2 Each by Does Not Apply route daily. CYANOCOBALAMIN (VITAMIN B-12) 1,000 MCG TABLET    Take 1,000 mcg by mouth daily. FIBER, PSYLLIUM HUSK, PO    Take  by mouth daily as needed. FISH OIL-OMEGA-3 FATTY ACIDS 340-1,000 MG CAPSULE    Take 1 Capsule by mouth daily. HYDROXYZINE PAMOATE (VISTARIL) 25 MG CAPSULE    Take 25 mg by mouth three (3) times daily as needed.     LIDOCAINE (LIDODERM) 5 %    APPLY 1 PATCHES DAILY TO AFFECTED AREAS AS NEEDED FOR PAIN. 12 HOURS ON 12 HOURS OFF    LINZESS 290 MCG CAP CAPSULE    Take 290 mcg by mouth as needed. METHYLPREDNISOLONE (MEDROL DOSEPACK) 4 MG TABLET    TAKE 6 TABLETS ON DAY 1 AS DIRECTED ON PACKAGE AND DECREASE BY 1 TAB EACH DAY FOR A TOTAL OF 6 DAYS    MULTIVITAMIN (ONE A DAY) TABLET    Take 1 Tab by mouth daily. NAPROXEN (NAPROSYN) 500 MG TABLET    TAKE 1 TABLET BY MOUTH EVERY 12 HOURS WITH FOOD OR MILK AS NEEDED FOR 7 DAYS    ONDANSETRON (ZOFRAN ODT) 4 MG DISINTEGRATING TABLET    Take 4 mg by mouth every eight (8) hours as needed. PANTOPRAZOLE (PROTONIX) 40 MG TABLET    Take 40 mg by mouth daily as needed. PREGABALIN (LYRICA) 100 MG CAPSULE    One po qhs x 1 week, then one po bid thereafter    PYRIDOXINE HCL, VITAMIN B6, (VITAMIN B-6 PO)    Take 1 Tablet by mouth daily. SIMVASTATIN (ZOCOR) 20 MG TABLET    Take 20 mg by mouth nightly. TRAZODONE (DESYREL) 50 MG TABLET    Take 75 mg by mouth nightly. TRIAMCINOLONE ACETONIDE (KENALOG) 0.1 % TOPICAL CREAM    APPLY 1 APPLICATION EXTERNALLY TWICE A DAY FOR 7 DAYS    ZINC 50 MG TAB TABLET    Take 50 mg by mouth daily. These Medications have changed    No medications on file   Stop Taking    BUTALBITAL-ACETAMINOPHEN-CAFF (FIORICET) -40 MG PER CAPSULE    Take 1 Capsule by mouth every six (6) hours as needed for Headache for up to 10 doses. CETIRIZINE (ZYRTEC) 10 MG TABLET    Take 10 mg by mouth daily as needed. IRBESARTAN (AVAPRO) 300 MG TABLET    TAKE 1 TABLET BY MOUTH EVERY DAY FOR BLOOD PRESSURE    SERTRALINE (ZOLOFT) 100 MG TABLET    Take 100 mg by mouth daily. TIZANIDINE (ZANAFLEX) 4 MG TABLET    TAKE 1 TABLET (4 MG) BY MOUTH EVERY 6 HOURS AS NEEDED NOT TO EXCEED 3 DOSES IN 24 HOURS    TRIAMTERENE-HYDROCHLOROTHIAZIDE (DYAZIDE) 37.5-25 MG PER CAPSULE    Take  by mouth daily. ZIPRASIDONE (GEODON) 20 MG CAPSULE    Take 20 mg by mouth two (2) times daily (with meals).        Dictation disclaimer:  Please note that this dictation was completed with Dragon, the computer voice recognition software. Quite often unanticipated grammatical, syntax, homophones, and other interpretive errors are inadvertently transcribed by the computer software. Please disregard these errors. Please excuse any errors that have escaped final proofreading. No

## 2023-11-08 NOTE — H&P PST ADULT - NEUROLOGICAL
normal/cranial nerves II-XII intact/sensation intact/responds to verbal commands/cranial nerves intact/no spontaneous movement/superficial reflexes intact negative

## 2023-11-09 ENCOUNTER — OUTPATIENT (OUTPATIENT)
Dept: OUTPATIENT SERVICES | Facility: HOSPITAL | Age: 78
LOS: 1 days | End: 2023-11-09
Payer: MEDICARE

## 2023-11-09 ENCOUNTER — OUTPATIENT (OUTPATIENT)
Dept: OUTPATIENT SERVICES | Facility: HOSPITAL | Age: 78
LOS: 1 days | End: 2023-11-09

## 2023-11-09 ENCOUNTER — APPOINTMENT (OUTPATIENT)
Dept: ULTRASOUND IMAGING | Facility: CLINIC | Age: 78
End: 2023-11-09
Payer: MEDICARE

## 2023-11-09 DIAGNOSIS — Z90.49 ACQUIRED ABSENCE OF OTHER SPECIFIED PARTS OF DIGESTIVE TRACT: Chronic | ICD-10-CM

## 2023-11-09 DIAGNOSIS — Z96.652 PRESENCE OF LEFT ARTIFICIAL KNEE JOINT: Chronic | ICD-10-CM

## 2023-11-09 DIAGNOSIS — E78.5 HYPERLIPIDEMIA, UNSPECIFIED: ICD-10-CM

## 2023-11-09 DIAGNOSIS — Z98.890 OTHER SPECIFIED POSTPROCEDURAL STATES: Chronic | ICD-10-CM

## 2023-11-09 DIAGNOSIS — Q24.8 OTHER SPECIFIED CONGENITAL MALFORMATIONS OF HEART: ICD-10-CM

## 2023-11-09 PROBLEM — M51.26 OTHER INTERVERTEBRAL DISC DISPLACEMENT, LUMBAR REGION: Chronic | Status: ACTIVE | Noted: 2023-11-08

## 2023-11-09 PROBLEM — Z86.19 PERSONAL HISTORY OF OTHER INFECTIOUS AND PARASITIC DISEASES: Chronic | Status: ACTIVE | Noted: 2023-11-08

## 2023-11-09 LAB
CULTURE RESULTS: SIGNIFICANT CHANGE UP
CULTURE RESULTS: SIGNIFICANT CHANGE UP
SPECIMEN SOURCE: SIGNIFICANT CHANGE UP
SPECIMEN SOURCE: SIGNIFICANT CHANGE UP

## 2023-11-09 PROCEDURE — C8929: CPT

## 2023-11-09 PROCEDURE — 93306 TTE W/DOPPLER COMPLETE: CPT | Mod: 26

## 2023-11-09 PROCEDURE — 93880 EXTRACRANIAL BILAT STUDY: CPT | Mod: 26

## 2023-11-13 ENCOUNTER — TRANSCRIPTION ENCOUNTER (OUTPATIENT)
Age: 78
End: 2023-11-13

## 2023-11-13 NOTE — ASU PATIENT PROFILE, ADULT - NSICDXPASTMEDICALHX_GEN_ALL_CORE_FT
PAST MEDICAL HISTORY:  Arteriosclerotic heart disease (ASHD)     COVID-19 vaccine series completed     Former smoker     H/o Lyme disease     H/O varicose veins     History of radiation therapy     [place]     Hyperlipidemia     Hypertension     Liver mass     Lumbar herniated disc     Prostate cancer

## 2023-11-14 ENCOUNTER — RESULT REVIEW (OUTPATIENT)
Age: 78
End: 2023-11-14

## 2023-11-14 ENCOUNTER — TRANSCRIPTION ENCOUNTER (OUTPATIENT)
Age: 78
End: 2023-11-14

## 2023-11-14 ENCOUNTER — APPOINTMENT (OUTPATIENT)
Dept: CARDIOTHORACIC SURGERY | Facility: HOSPITAL | Age: 78
End: 2023-11-14

## 2023-11-14 ENCOUNTER — INPATIENT (INPATIENT)
Facility: HOSPITAL | Age: 78
LOS: 4 days | Discharge: HOME HEALTH | DRG: 235 | End: 2023-11-19
Attending: THORACIC SURGERY (CARDIOTHORACIC VASCULAR SURGERY) | Admitting: THORACIC SURGERY (CARDIOTHORACIC VASCULAR SURGERY)
Payer: MEDICARE

## 2023-11-14 VITALS
RESPIRATION RATE: 16 BRPM | OXYGEN SATURATION: 99 % | HEART RATE: 72 BPM | DIASTOLIC BLOOD PRESSURE: 63 MMHG | WEIGHT: 250 LBS | HEIGHT: 74 IN | SYSTOLIC BLOOD PRESSURE: 115 MMHG | TEMPERATURE: 98 F

## 2023-11-14 DIAGNOSIS — Z90.49 ACQUIRED ABSENCE OF OTHER SPECIFIED PARTS OF DIGESTIVE TRACT: Chronic | ICD-10-CM

## 2023-11-14 DIAGNOSIS — Z98.890 OTHER SPECIFIED POSTPROCEDURAL STATES: Chronic | ICD-10-CM

## 2023-11-14 DIAGNOSIS — I25.10 ATHEROSCLEROTIC HEART DISEASE OF NATIVE CORONARY ARTERY WITHOUT ANGINA PECTORIS: ICD-10-CM

## 2023-11-14 DIAGNOSIS — Z96.652 PRESENCE OF LEFT ARTIFICIAL KNEE JOINT: Chronic | ICD-10-CM

## 2023-11-14 LAB
ABO RH CONFIRMATION: SIGNIFICANT CHANGE UP
ABO RH CONFIRMATION: SIGNIFICANT CHANGE UP
ALBUMIN SERPL ELPH-MCNC: 3.8 G/DL — SIGNIFICANT CHANGE UP (ref 3.3–5.2)
ALBUMIN SERPL ELPH-MCNC: 3.8 G/DL — SIGNIFICANT CHANGE UP (ref 3.3–5.2)
ALP SERPL-CCNC: 53 U/L — SIGNIFICANT CHANGE UP (ref 40–120)
ALP SERPL-CCNC: 53 U/L — SIGNIFICANT CHANGE UP (ref 40–120)
ALT FLD-CCNC: 15 U/L — SIGNIFICANT CHANGE UP
ALT FLD-CCNC: 15 U/L — SIGNIFICANT CHANGE UP
ANION GAP SERPL CALC-SCNC: 11 MMOL/L — SIGNIFICANT CHANGE UP (ref 5–17)
ANION GAP SERPL CALC-SCNC: 11 MMOL/L — SIGNIFICANT CHANGE UP (ref 5–17)
ANION GAP SERPL CALC-SCNC: 14 MMOL/L — SIGNIFICANT CHANGE UP (ref 5–17)
ANION GAP SERPL CALC-SCNC: 14 MMOL/L — SIGNIFICANT CHANGE UP (ref 5–17)
APTT BLD: 26.2 SEC — SIGNIFICANT CHANGE UP (ref 24.5–35.6)
APTT BLD: 26.2 SEC — SIGNIFICANT CHANGE UP (ref 24.5–35.6)
AST SERPL-CCNC: 23 U/L — SIGNIFICANT CHANGE UP
AST SERPL-CCNC: 23 U/L — SIGNIFICANT CHANGE UP
BASE EXCESS BLDA CALC-SCNC: -0.3 MMOL/L — SIGNIFICANT CHANGE UP (ref -2–3)
BASE EXCESS BLDA CALC-SCNC: -0.3 MMOL/L — SIGNIFICANT CHANGE UP (ref -2–3)
BASE EXCESS BLDA CALC-SCNC: -2.2 MMOL/L — LOW (ref -2–3)
BASE EXCESS BLDA CALC-SCNC: -2.2 MMOL/L — LOW (ref -2–3)
BASE EXCESS BLDA CALC-SCNC: -2.6 MMOL/L — LOW (ref -2–3)
BASE EXCESS BLDA CALC-SCNC: -2.6 MMOL/L — LOW (ref -2–3)
BASE EXCESS BLDA CALC-SCNC: -3.1 MMOL/L — LOW (ref -2–3)
BASE EXCESS BLDA CALC-SCNC: -3.1 MMOL/L — LOW (ref -2–3)
BASE EXCESS BLDA CALC-SCNC: -3.3 MMOL/L — LOW (ref -2–3)
BASE EXCESS BLDA CALC-SCNC: -3.3 MMOL/L — LOW (ref -2–3)
BASE EXCESS BLDA CALC-SCNC: -3.9 MMOL/L — LOW (ref -2–3)
BASE EXCESS BLDA CALC-SCNC: -3.9 MMOL/L — LOW (ref -2–3)
BASE EXCESS BLDA CALC-SCNC: -4.4 MMOL/L — LOW (ref -2–3)
BASE EXCESS BLDA CALC-SCNC: -4.4 MMOL/L — LOW (ref -2–3)
BASE EXCESS BLDA CALC-SCNC: -4.6 MMOL/L — LOW (ref -2–3)
BASE EXCESS BLDA CALC-SCNC: -4.6 MMOL/L — LOW (ref -2–3)
BASE EXCESS BLDV CALC-SCNC: -2 MMOL/L — SIGNIFICANT CHANGE UP (ref -2–3)
BASE EXCESS BLDV CALC-SCNC: -2 MMOL/L — SIGNIFICANT CHANGE UP (ref -2–3)
BASE EXCESS BLDV CALC-SCNC: -3.4 MMOL/L — LOW (ref -2–3)
BASE EXCESS BLDV CALC-SCNC: -3.4 MMOL/L — LOW (ref -2–3)
BASE EXCESS BLDV CALC-SCNC: -5 MMOL/L — LOW (ref -2–3)
BASE EXCESS BLDV CALC-SCNC: -5 MMOL/L — LOW (ref -2–3)
BASOPHILS # BLD AUTO: 0.08 K/UL — SIGNIFICANT CHANGE UP (ref 0–0.2)
BASOPHILS # BLD AUTO: 0.08 K/UL — SIGNIFICANT CHANGE UP (ref 0–0.2)
BASOPHILS NFR BLD AUTO: 0.3 % — SIGNIFICANT CHANGE UP (ref 0–2)
BASOPHILS NFR BLD AUTO: 0.3 % — SIGNIFICANT CHANGE UP (ref 0–2)
BILIRUB SERPL-MCNC: 0.7 MG/DL — SIGNIFICANT CHANGE UP (ref 0.4–2)
BILIRUB SERPL-MCNC: 0.7 MG/DL — SIGNIFICANT CHANGE UP (ref 0.4–2)
BUN SERPL-MCNC: 22.2 MG/DL — HIGH (ref 8–20)
BUN SERPL-MCNC: 22.2 MG/DL — HIGH (ref 8–20)
BUN SERPL-MCNC: 25 MG/DL — HIGH (ref 8–20)
BUN SERPL-MCNC: 25 MG/DL — HIGH (ref 8–20)
CA-I BLDA-SCNC: 0.97 MMOL/L — LOW (ref 1.15–1.33)
CA-I BLDA-SCNC: 0.97 MMOL/L — LOW (ref 1.15–1.33)
CA-I BLDA-SCNC: 1.02 MMOL/L — LOW (ref 1.15–1.33)
CA-I BLDA-SCNC: 1.16 MMOL/L — SIGNIFICANT CHANGE UP (ref 1.15–1.33)
CA-I BLDA-SCNC: 1.16 MMOL/L — SIGNIFICANT CHANGE UP (ref 1.15–1.33)
CA-I BLDA-SCNC: 1.18 MMOL/L — SIGNIFICANT CHANGE UP (ref 1.15–1.33)
CA-I BLDA-SCNC: 1.18 MMOL/L — SIGNIFICANT CHANGE UP (ref 1.15–1.33)
CA-I BLDA-SCNC: 1.23 MMOL/L — SIGNIFICANT CHANGE UP (ref 1.15–1.33)
CA-I BLDA-SCNC: 1.23 MMOL/L — SIGNIFICANT CHANGE UP (ref 1.15–1.33)
CA-I BLDA-SCNC: 1.3 MMOL/L — SIGNIFICANT CHANGE UP (ref 1.15–1.33)
CA-I BLDA-SCNC: 1.3 MMOL/L — SIGNIFICANT CHANGE UP (ref 1.15–1.33)
CA-I BLDA-SCNC: 1.44 MMOL/L — HIGH (ref 1.15–1.33)
CA-I BLDA-SCNC: 1.44 MMOL/L — HIGH (ref 1.15–1.33)
CA-I SERPL-SCNC: 1 MMOL/L — LOW (ref 1.15–1.33)
CA-I SERPL-SCNC: 1 MMOL/L — LOW (ref 1.15–1.33)
CA-I SERPL-SCNC: 1.03 MMOL/L — LOW (ref 1.15–1.33)
CA-I SERPL-SCNC: 1.03 MMOL/L — LOW (ref 1.15–1.33)
CA-I SERPL-SCNC: 1.05 MMOL/L — LOW (ref 1.15–1.33)
CA-I SERPL-SCNC: 1.05 MMOL/L — LOW (ref 1.15–1.33)
CALCIUM SERPL-MCNC: 9.3 MG/DL — SIGNIFICANT CHANGE UP (ref 8.4–10.5)
CALCIUM SERPL-MCNC: 9.3 MG/DL — SIGNIFICANT CHANGE UP (ref 8.4–10.5)
CALCIUM SERPL-MCNC: 9.6 MG/DL — SIGNIFICANT CHANGE UP (ref 8.4–10.5)
CALCIUM SERPL-MCNC: 9.6 MG/DL — SIGNIFICANT CHANGE UP (ref 8.4–10.5)
CHLORIDE BLDA-SCNC: 103 MMOL/L — SIGNIFICANT CHANGE UP (ref 96–108)
CHLORIDE BLDA-SCNC: 103 MMOL/L — SIGNIFICANT CHANGE UP (ref 96–108)
CHLORIDE BLDA-SCNC: 104 MMOL/L — SIGNIFICANT CHANGE UP (ref 96–108)
CHLORIDE BLDA-SCNC: 105 MMOL/L — SIGNIFICANT CHANGE UP (ref 96–108)
CHLORIDE BLDA-SCNC: 106 MMOL/L — SIGNIFICANT CHANGE UP (ref 96–108)
CHLORIDE BLDA-SCNC: 106 MMOL/L — SIGNIFICANT CHANGE UP (ref 96–108)
CHLORIDE BLDV-SCNC: 103 MMOL/L — SIGNIFICANT CHANGE UP (ref 96–108)
CHLORIDE BLDV-SCNC: 103 MMOL/L — SIGNIFICANT CHANGE UP (ref 96–108)
CHLORIDE BLDV-SCNC: 104 MMOL/L — SIGNIFICANT CHANGE UP (ref 96–108)
CHLORIDE BLDV-SCNC: 104 MMOL/L — SIGNIFICANT CHANGE UP (ref 96–108)
CHLORIDE BLDV-SCNC: 105 MMOL/L — SIGNIFICANT CHANGE UP (ref 96–108)
CHLORIDE BLDV-SCNC: 105 MMOL/L — SIGNIFICANT CHANGE UP (ref 96–108)
CHLORIDE SERPL-SCNC: 102 MMOL/L — SIGNIFICANT CHANGE UP (ref 96–108)
CK MB CFR SERPL CALC: 10.5 NG/ML — HIGH (ref 0–6.7)
CK MB CFR SERPL CALC: 10.5 NG/ML — HIGH (ref 0–6.7)
CK SERPL-CCNC: 203 U/L — HIGH (ref 30–200)
CK SERPL-CCNC: 203 U/L — HIGH (ref 30–200)
CO2 SERPL-SCNC: 23 MMOL/L — SIGNIFICANT CHANGE UP (ref 22–29)
CO2 SERPL-SCNC: 23 MMOL/L — SIGNIFICANT CHANGE UP (ref 22–29)
CO2 SERPL-SCNC: 24 MMOL/L — SIGNIFICANT CHANGE UP (ref 22–29)
CO2 SERPL-SCNC: 24 MMOL/L — SIGNIFICANT CHANGE UP (ref 22–29)
COHGB MFR BLDA: 0.3 % — SIGNIFICANT CHANGE UP
COHGB MFR BLDA: 0.3 % — SIGNIFICANT CHANGE UP
COHGB MFR BLDA: 0.4 % — SIGNIFICANT CHANGE UP
COHGB MFR BLDA: 0.4 % — SIGNIFICANT CHANGE UP
COHGB MFR BLDA: 0.6 % — SIGNIFICANT CHANGE UP
COHGB MFR BLDA: 0.7 % — SIGNIFICANT CHANGE UP
COHGB MFR BLDA: 0.7 % — SIGNIFICANT CHANGE UP
COHGB MFR BLDA: 0.9 % — SIGNIFICANT CHANGE UP
COHGB MFR BLDV: 1.2 % — SIGNIFICANT CHANGE UP
CREAT SERPL-MCNC: 0.93 MG/DL — SIGNIFICANT CHANGE UP (ref 0.5–1.3)
CREAT SERPL-MCNC: 0.93 MG/DL — SIGNIFICANT CHANGE UP (ref 0.5–1.3)
CREAT SERPL-MCNC: 1.26 MG/DL — SIGNIFICANT CHANGE UP (ref 0.5–1.3)
CREAT SERPL-MCNC: 1.26 MG/DL — SIGNIFICANT CHANGE UP (ref 0.5–1.3)
EGFR: 59 ML/MIN/1.73M2 — LOW
EGFR: 59 ML/MIN/1.73M2 — LOW
EGFR: 85 ML/MIN/1.73M2 — SIGNIFICANT CHANGE UP
EGFR: 85 ML/MIN/1.73M2 — SIGNIFICANT CHANGE UP
EOSINOPHIL # BLD AUTO: 0.05 K/UL — SIGNIFICANT CHANGE UP (ref 0–0.5)
EOSINOPHIL # BLD AUTO: 0.05 K/UL — SIGNIFICANT CHANGE UP (ref 0–0.5)
EOSINOPHIL NFR BLD AUTO: 0.2 % — SIGNIFICANT CHANGE UP (ref 0–6)
EOSINOPHIL NFR BLD AUTO: 0.2 % — SIGNIFICANT CHANGE UP (ref 0–6)
GAS PNL BLDA: SIGNIFICANT CHANGE UP
GAS PNL BLDV: 133 MMOL/L — LOW (ref 136–145)
GAS PNL BLDV: 133 MMOL/L — LOW (ref 136–145)
GAS PNL BLDV: 134 MMOL/L — LOW (ref 136–145)
GLUCOSE BLDA-MCNC: 111 MG/DL — HIGH (ref 70–99)
GLUCOSE BLDA-MCNC: 111 MG/DL — HIGH (ref 70–99)
GLUCOSE BLDA-MCNC: 158 MG/DL — HIGH (ref 70–99)
GLUCOSE BLDA-MCNC: 158 MG/DL — HIGH (ref 70–99)
GLUCOSE BLDA-MCNC: 162 MG/DL — HIGH (ref 70–99)
GLUCOSE BLDA-MCNC: 162 MG/DL — HIGH (ref 70–99)
GLUCOSE BLDA-MCNC: 167 MG/DL — HIGH (ref 70–99)
GLUCOSE BLDA-MCNC: 167 MG/DL — HIGH (ref 70–99)
GLUCOSE BLDA-MCNC: 185 MG/DL — HIGH (ref 70–99)
GLUCOSE BLDA-MCNC: 185 MG/DL — HIGH (ref 70–99)
GLUCOSE BLDA-MCNC: 195 MG/DL — HIGH (ref 70–99)
GLUCOSE BLDA-MCNC: 195 MG/DL — HIGH (ref 70–99)
GLUCOSE BLDA-MCNC: 203 MG/DL — HIGH (ref 70–99)
GLUCOSE BLDA-MCNC: 203 MG/DL — HIGH (ref 70–99)
GLUCOSE BLDA-MCNC: 221 MG/DL — HIGH (ref 70–99)
GLUCOSE BLDA-MCNC: 221 MG/DL — HIGH (ref 70–99)
GLUCOSE BLDC GLUCOMTR-MCNC: 147 MG/DL — HIGH (ref 70–99)
GLUCOSE BLDC GLUCOMTR-MCNC: 147 MG/DL — HIGH (ref 70–99)
GLUCOSE BLDC GLUCOMTR-MCNC: 167 MG/DL — HIGH (ref 70–99)
GLUCOSE BLDC GLUCOMTR-MCNC: 167 MG/DL — HIGH (ref 70–99)
GLUCOSE BLDC GLUCOMTR-MCNC: 175 MG/DL — HIGH (ref 70–99)
GLUCOSE BLDC GLUCOMTR-MCNC: 175 MG/DL — HIGH (ref 70–99)
GLUCOSE BLDC GLUCOMTR-MCNC: 178 MG/DL — HIGH (ref 70–99)
GLUCOSE BLDC GLUCOMTR-MCNC: 178 MG/DL — HIGH (ref 70–99)
GLUCOSE BLDC GLUCOMTR-MCNC: 180 MG/DL — HIGH (ref 70–99)
GLUCOSE BLDC GLUCOMTR-MCNC: 180 MG/DL — HIGH (ref 70–99)
GLUCOSE BLDC GLUCOMTR-MCNC: 184 MG/DL — HIGH (ref 70–99)
GLUCOSE BLDC GLUCOMTR-MCNC: 184 MG/DL — HIGH (ref 70–99)
GLUCOSE BLDC GLUCOMTR-MCNC: 192 MG/DL — HIGH (ref 70–99)
GLUCOSE BLDC GLUCOMTR-MCNC: 204 MG/DL — HIGH (ref 70–99)
GLUCOSE BLDC GLUCOMTR-MCNC: 204 MG/DL — HIGH (ref 70–99)
GLUCOSE BLDV-MCNC: 158 MG/DL — HIGH (ref 70–99)
GLUCOSE BLDV-MCNC: 158 MG/DL — HIGH (ref 70–99)
GLUCOSE BLDV-MCNC: 162 MG/DL — HIGH (ref 70–99)
GLUCOSE BLDV-MCNC: 162 MG/DL — HIGH (ref 70–99)
GLUCOSE BLDV-MCNC: 179 MG/DL — HIGH (ref 70–99)
GLUCOSE BLDV-MCNC: 179 MG/DL — HIGH (ref 70–99)
GLUCOSE SERPL-MCNC: 113 MG/DL — HIGH (ref 70–99)
GLUCOSE SERPL-MCNC: 113 MG/DL — HIGH (ref 70–99)
GLUCOSE SERPL-MCNC: 184 MG/DL — HIGH (ref 70–99)
GLUCOSE SERPL-MCNC: 184 MG/DL — HIGH (ref 70–99)
HCO3 BLDA-SCNC: 20 MMOL/L — LOW (ref 21–28)
HCO3 BLDA-SCNC: 20 MMOL/L — LOW (ref 21–28)
HCO3 BLDA-SCNC: 21 MMOL/L — SIGNIFICANT CHANGE UP (ref 21–28)
HCO3 BLDA-SCNC: 22 MMOL/L — SIGNIFICANT CHANGE UP (ref 21–28)
HCO3 BLDA-SCNC: 23 MMOL/L — SIGNIFICANT CHANGE UP (ref 21–28)
HCO3 BLDA-SCNC: 23 MMOL/L — SIGNIFICANT CHANGE UP (ref 21–28)
HCO3 BLDA-SCNC: 25 MMOL/L — SIGNIFICANT CHANGE UP (ref 21–28)
HCO3 BLDA-SCNC: 25 MMOL/L — SIGNIFICANT CHANGE UP (ref 21–28)
HCO3 BLDV-SCNC: 21 MMOL/L — LOW (ref 22–29)
HCO3 BLDV-SCNC: 21 MMOL/L — LOW (ref 22–29)
HCO3 BLDV-SCNC: 22 MMOL/L — SIGNIFICANT CHANGE UP (ref 22–29)
HCO3 BLDV-SCNC: 22 MMOL/L — SIGNIFICANT CHANGE UP (ref 22–29)
HCO3 BLDV-SCNC: 23 MMOL/L — SIGNIFICANT CHANGE UP (ref 22–29)
HCO3 BLDV-SCNC: 23 MMOL/L — SIGNIFICANT CHANGE UP (ref 22–29)
HCT VFR BLD CALC: 33 % — LOW (ref 39–50)
HCT VFR BLD CALC: 33 % — LOW (ref 39–50)
HCT VFR BLDA CALC: 26 % — SIGNIFICANT CHANGE UP
HCT VFR BLDA CALC: 28 % — SIGNIFICANT CHANGE UP
HCT VFR BLDA CALC: 28 % — SIGNIFICANT CHANGE UP
HCT VFR BLDA CALC: 29 % — SIGNIFICANT CHANGE UP
HCT VFR BLDA CALC: 29 % — SIGNIFICANT CHANGE UP
HCT VFR BLDA CALC: 30 % — SIGNIFICANT CHANGE UP
HCT VFR BLDA CALC: 31 % — SIGNIFICANT CHANGE UP
HCT VFR BLDA CALC: 31 % — SIGNIFICANT CHANGE UP
HCT VFR BLDA CALC: 33 % — SIGNIFICANT CHANGE UP
HCT VFR BLDA CALC: 33 % — SIGNIFICANT CHANGE UP
HCT VFR BLDA CALC: 34 % — SIGNIFICANT CHANGE UP
HCT VFR BLDA CALC: 34 % — SIGNIFICANT CHANGE UP
HCT VFR BLDA CALC: 37 % — SIGNIFICANT CHANGE UP
HCT VFR BLDA CALC: 37 % — SIGNIFICANT CHANGE UP
HGB BLD CALC-MCNC: 10 G/DL — LOW (ref 12.6–17.4)
HGB BLD CALC-MCNC: 10 G/DL — LOW (ref 12.6–17.4)
HGB BLD CALC-MCNC: 8.6 G/DL — LOW (ref 12.6–17.4)
HGB BLD CALC-MCNC: 8.6 G/DL — LOW (ref 12.6–17.4)
HGB BLD CALC-MCNC: 9.3 G/DL — LOW (ref 12.6–17.4)
HGB BLD CALC-MCNC: 9.3 G/DL — LOW (ref 12.6–17.4)
HGB BLD-MCNC: 11.1 G/DL — LOW (ref 13–17)
HGB BLD-MCNC: 11.1 G/DL — LOW (ref 13–17)
HGB BLDA-MCNC: 10 G/DL — LOW (ref 12.6–17.4)
HGB BLDA-MCNC: 10 G/DL — LOW (ref 12.6–17.4)
HGB BLDA-MCNC: 10.1 G/DL — LOW (ref 12.6–17.4)
HGB BLDA-MCNC: 10.1 G/DL — LOW (ref 12.6–17.4)
HGB BLDA-MCNC: 10.2 G/DL — LOW (ref 12.6–17.4)
HGB BLDA-MCNC: 10.2 G/DL — LOW (ref 12.6–17.4)
HGB BLDA-MCNC: 10.9 G/DL — LOW (ref 12.6–17.4)
HGB BLDA-MCNC: 10.9 G/DL — LOW (ref 12.6–17.4)
HGB BLDA-MCNC: 11.2 G/DL — LOW (ref 12.6–17.4)
HGB BLDA-MCNC: 11.2 G/DL — LOW (ref 12.6–17.4)
HGB BLDA-MCNC: 12.3 G/DL — LOW (ref 12.6–17.4)
HGB BLDA-MCNC: 12.3 G/DL — LOW (ref 12.6–17.4)
HGB BLDA-MCNC: 8.8 G/DL — LOW (ref 12.6–17.4)
HGB BLDA-MCNC: 8.8 G/DL — LOW (ref 12.6–17.4)
HGB BLDA-MCNC: 9.6 G/DL — LOW (ref 12.6–17.4)
HGB BLDA-MCNC: 9.6 G/DL — LOW (ref 12.6–17.4)
IMM GRANULOCYTES NFR BLD AUTO: 1.2 % — HIGH (ref 0–0.9)
IMM GRANULOCYTES NFR BLD AUTO: 1.2 % — HIGH (ref 0–0.9)
INR BLD: 1.13 RATIO — SIGNIFICANT CHANGE UP (ref 0.85–1.18)
INR BLD: 1.13 RATIO — SIGNIFICANT CHANGE UP (ref 0.85–1.18)
LACTATE BLDA-MCNC: 1 MMOL/L — SIGNIFICANT CHANGE UP (ref 0.5–2)
LACTATE BLDA-MCNC: 1 MMOL/L — SIGNIFICANT CHANGE UP (ref 0.5–2)
LACTATE BLDA-MCNC: 1.2 MMOL/L — SIGNIFICANT CHANGE UP (ref 0.5–2)
LACTATE BLDA-MCNC: 1.2 MMOL/L — SIGNIFICANT CHANGE UP (ref 0.5–2)
LACTATE BLDA-MCNC: 1.4 MMOL/L — SIGNIFICANT CHANGE UP (ref 0.5–2)
LACTATE BLDA-MCNC: 1.4 MMOL/L — SIGNIFICANT CHANGE UP (ref 0.5–2)
LACTATE BLDA-MCNC: 1.9 MMOL/L — SIGNIFICANT CHANGE UP (ref 0.5–2)
LACTATE BLDA-MCNC: 1.9 MMOL/L — SIGNIFICANT CHANGE UP (ref 0.5–2)
LACTATE BLDA-MCNC: 2.5 MMOL/L — HIGH (ref 0.5–2)
LACTATE BLDA-MCNC: 2.5 MMOL/L — HIGH (ref 0.5–2)
LACTATE BLDA-MCNC: 3.3 MMOL/L — HIGH (ref 0.5–2)
LACTATE BLDA-MCNC: 3.3 MMOL/L — HIGH (ref 0.5–2)
LACTATE BLDA-MCNC: 3.6 MMOL/L — HIGH (ref 0.5–2)
LACTATE BLDA-MCNC: 3.6 MMOL/L — HIGH (ref 0.5–2)
LACTATE BLDA-MCNC: 3.7 MMOL/L — HIGH (ref 0.5–2)
LACTATE BLDA-MCNC: 3.7 MMOL/L — HIGH (ref 0.5–2)
LACTATE BLDV-MCNC: 1.4 MMOL/L — SIGNIFICANT CHANGE UP (ref 0.5–2)
LACTATE BLDV-MCNC: 1.4 MMOL/L — SIGNIFICANT CHANGE UP (ref 0.5–2)
LACTATE BLDV-MCNC: 1.8 MMOL/L — SIGNIFICANT CHANGE UP (ref 0.5–2)
LACTATE BLDV-MCNC: 1.8 MMOL/L — SIGNIFICANT CHANGE UP (ref 0.5–2)
LACTATE BLDV-MCNC: 2.3 MMOL/L — HIGH (ref 0.5–2)
LACTATE BLDV-MCNC: 2.3 MMOL/L — HIGH (ref 0.5–2)
LYMPHOCYTES # BLD AUTO: 1.85 K/UL — SIGNIFICANT CHANGE UP (ref 1–3.3)
LYMPHOCYTES # BLD AUTO: 1.85 K/UL — SIGNIFICANT CHANGE UP (ref 1–3.3)
LYMPHOCYTES # BLD AUTO: 6.9 % — LOW (ref 13–44)
LYMPHOCYTES # BLD AUTO: 6.9 % — LOW (ref 13–44)
MAGNESIUM SERPL-MCNC: 2.9 MG/DL — HIGH (ref 1.8–2.6)
MAGNESIUM SERPL-MCNC: 2.9 MG/DL — HIGH (ref 1.8–2.6)
MCHC RBC-ENTMCNC: 30.2 PG — SIGNIFICANT CHANGE UP (ref 27–34)
MCHC RBC-ENTMCNC: 30.2 PG — SIGNIFICANT CHANGE UP (ref 27–34)
MCHC RBC-ENTMCNC: 33.6 GM/DL — SIGNIFICANT CHANGE UP (ref 32–36)
MCHC RBC-ENTMCNC: 33.6 GM/DL — SIGNIFICANT CHANGE UP (ref 32–36)
MCV RBC AUTO: 89.7 FL — SIGNIFICANT CHANGE UP (ref 80–100)
MCV RBC AUTO: 89.7 FL — SIGNIFICANT CHANGE UP (ref 80–100)
METHGB MFR BLDA: 0.1 % — SIGNIFICANT CHANGE UP
METHGB MFR BLDA: 0.1 % — SIGNIFICANT CHANGE UP
METHGB MFR BLDA: 0.3 % — SIGNIFICANT CHANGE UP
METHGB MFR BLDA: 0.5 % — SIGNIFICANT CHANGE UP
METHGB MFR BLDA: 0.7 % — SIGNIFICANT CHANGE UP
METHGB MFR BLDA: 0.7 % — SIGNIFICANT CHANGE UP
METHGB MFR BLDA: 0.8 % — SIGNIFICANT CHANGE UP
METHGB MFR BLDA: 0.8 % — SIGNIFICANT CHANGE UP
METHGB MFR BLDA: 1.7 % — HIGH
METHGB MFR BLDA: 1.7 % — HIGH
METHGB MFR BLDV: 0 % — SIGNIFICANT CHANGE UP
METHGB MFR BLDV: 0 % — SIGNIFICANT CHANGE UP
METHGB MFR BLDV: 0.4 % — SIGNIFICANT CHANGE UP
METHGB MFR BLDV: 0.4 % — SIGNIFICANT CHANGE UP
METHGB MFR BLDV: 0.5 % — SIGNIFICANT CHANGE UP
METHGB MFR BLDV: 0.5 % — SIGNIFICANT CHANGE UP
MONOCYTES # BLD AUTO: 1.54 K/UL — HIGH (ref 0–0.9)
MONOCYTES # BLD AUTO: 1.54 K/UL — HIGH (ref 0–0.9)
MONOCYTES NFR BLD AUTO: 5.8 % — SIGNIFICANT CHANGE UP (ref 2–14)
MONOCYTES NFR BLD AUTO: 5.8 % — SIGNIFICANT CHANGE UP (ref 2–14)
NEUTROPHILS # BLD AUTO: 22.93 K/UL — HIGH (ref 1.8–7.4)
NEUTROPHILS # BLD AUTO: 22.93 K/UL — HIGH (ref 1.8–7.4)
NEUTROPHILS NFR BLD AUTO: 85.6 % — HIGH (ref 43–77)
NEUTROPHILS NFR BLD AUTO: 85.6 % — HIGH (ref 43–77)
OXYHGB MFR BLDA: 96 % — HIGH (ref 90–95)
OXYHGB MFR BLDA: 96 % — HIGH (ref 90–95)
OXYHGB MFR BLDA: 98 % — HIGH (ref 90–95)
OXYHGB MFR BLDA: 99 % — HIGH (ref 90–95)
PCO2 BLDA: 32 MMHG — LOW (ref 35–48)
PCO2 BLDA: 32 MMHG — LOW (ref 35–48)
PCO2 BLDA: 33 MMHG — LOW (ref 35–48)
PCO2 BLDA: 33 MMHG — LOW (ref 35–48)
PCO2 BLDA: 37 MMHG — SIGNIFICANT CHANGE UP (ref 35–48)
PCO2 BLDA: 37 MMHG — SIGNIFICANT CHANGE UP (ref 35–48)
PCO2 BLDA: 38 MMHG — SIGNIFICANT CHANGE UP (ref 35–48)
PCO2 BLDA: 41 MMHG — SIGNIFICANT CHANGE UP (ref 35–48)
PCO2 BLDA: 41 MMHG — SIGNIFICANT CHANGE UP (ref 35–48)
PCO2 BLDA: 42 MMHG — SIGNIFICANT CHANGE UP (ref 35–48)
PCO2 BLDA: 42 MMHG — SIGNIFICANT CHANGE UP (ref 35–48)
PCO2 BLDV: 39 MMHG — LOW (ref 42–55)
PCO2 BLDV: 41 MMHG — LOW (ref 42–55)
PCO2 BLDV: 41 MMHG — LOW (ref 42–55)
PH BLDA: 7.35 — SIGNIFICANT CHANGE UP (ref 7.35–7.45)
PH BLDA: 7.35 — SIGNIFICANT CHANGE UP (ref 7.35–7.45)
PH BLDA: 7.36 — SIGNIFICANT CHANGE UP (ref 7.35–7.45)
PH BLDA: 7.36 — SIGNIFICANT CHANGE UP (ref 7.35–7.45)
PH BLDA: 7.37 — SIGNIFICANT CHANGE UP (ref 7.35–7.45)
PH BLDA: 7.38 — SIGNIFICANT CHANGE UP (ref 7.35–7.45)
PH BLDA: 7.4 — SIGNIFICANT CHANGE UP (ref 7.35–7.45)
PH BLDA: 7.4 — SIGNIFICANT CHANGE UP (ref 7.35–7.45)
PH BLDA: 7.43 — SIGNIFICANT CHANGE UP (ref 7.35–7.45)
PH BLDA: 7.43 — SIGNIFICANT CHANGE UP (ref 7.35–7.45)
PH BLDV: 7.32 — SIGNIFICANT CHANGE UP (ref 7.32–7.43)
PH BLDV: 7.32 — SIGNIFICANT CHANGE UP (ref 7.32–7.43)
PH BLDV: 7.36 — SIGNIFICANT CHANGE UP (ref 7.32–7.43)
PH BLDV: 7.36 — SIGNIFICANT CHANGE UP (ref 7.32–7.43)
PH BLDV: 7.38 — SIGNIFICANT CHANGE UP (ref 7.32–7.43)
PH BLDV: 7.38 — SIGNIFICANT CHANGE UP (ref 7.32–7.43)
PLATELET # BLD AUTO: 203 K/UL — SIGNIFICANT CHANGE UP (ref 150–400)
PLATELET # BLD AUTO: 203 K/UL — SIGNIFICANT CHANGE UP (ref 150–400)
PO2 BLDA: 338 MMHG — HIGH (ref 83–108)
PO2 BLDA: 338 MMHG — HIGH (ref 83–108)
PO2 BLDA: 395 MMHG — HIGH (ref 83–108)
PO2 BLDA: 395 MMHG — HIGH (ref 83–108)
PO2 BLDA: 422 MMHG — HIGH (ref 83–108)
PO2 BLDA: 422 MMHG — HIGH (ref 83–108)
PO2 BLDA: 442 MMHG — HIGH (ref 83–108)
PO2 BLDA: 442 MMHG — HIGH (ref 83–108)
PO2 BLDA: 457 MMHG — HIGH (ref 83–108)
PO2 BLDA: 457 MMHG — HIGH (ref 83–108)
PO2 BLDA: 463 MMHG — HIGH (ref 83–108)
PO2 BLDA: 463 MMHG — HIGH (ref 83–108)
PO2 BLDA: >496 MMHG — HIGH (ref 83–108)
PO2 BLDV: 67 MMHG — HIGH (ref 25–45)
PO2 BLDV: 67 MMHG — HIGH (ref 25–45)
PO2 BLDV: 68 MMHG — HIGH (ref 25–45)
PO2 BLDV: 68 MMHG — HIGH (ref 25–45)
PO2 BLDV: 71 MMHG — HIGH (ref 25–45)
PO2 BLDV: 71 MMHG — HIGH (ref 25–45)
POTASSIUM BLDA-SCNC: 4.6 MMOL/L — SIGNIFICANT CHANGE UP (ref 3.5–5.1)
POTASSIUM BLDA-SCNC: 4.6 MMOL/L — SIGNIFICANT CHANGE UP (ref 3.5–5.1)
POTASSIUM BLDA-SCNC: 4.7 MMOL/L — SIGNIFICANT CHANGE UP (ref 3.5–5.1)
POTASSIUM BLDA-SCNC: 4.8 MMOL/L — SIGNIFICANT CHANGE UP (ref 3.5–5.1)
POTASSIUM BLDA-SCNC: 4.9 MMOL/L — SIGNIFICANT CHANGE UP (ref 3.5–5.1)
POTASSIUM BLDA-SCNC: 4.9 MMOL/L — SIGNIFICANT CHANGE UP (ref 3.5–5.1)
POTASSIUM BLDA-SCNC: 5.4 MMOL/L — HIGH (ref 3.5–5.1)
POTASSIUM BLDA-SCNC: 5.4 MMOL/L — HIGH (ref 3.5–5.1)
POTASSIUM BLDA-SCNC: 5.8 MMOL/L — HIGH (ref 3.5–5.1)
POTASSIUM BLDA-SCNC: 5.8 MMOL/L — HIGH (ref 3.5–5.1)
POTASSIUM BLDV-SCNC: 4.8 MMOL/L — SIGNIFICANT CHANGE UP (ref 3.5–5.1)
POTASSIUM BLDV-SCNC: 4.8 MMOL/L — SIGNIFICANT CHANGE UP (ref 3.5–5.1)
POTASSIUM BLDV-SCNC: 5.4 MMOL/L — HIGH (ref 3.5–5.1)
POTASSIUM BLDV-SCNC: 5.4 MMOL/L — HIGH (ref 3.5–5.1)
POTASSIUM BLDV-SCNC: 5.6 MMOL/L — HIGH (ref 3.5–5.1)
POTASSIUM BLDV-SCNC: 5.6 MMOL/L — HIGH (ref 3.5–5.1)
POTASSIUM SERPL-MCNC: 4.3 MMOL/L — SIGNIFICANT CHANGE UP (ref 3.5–5.3)
POTASSIUM SERPL-MCNC: 4.3 MMOL/L — SIGNIFICANT CHANGE UP (ref 3.5–5.3)
POTASSIUM SERPL-MCNC: 4.4 MMOL/L — SIGNIFICANT CHANGE UP (ref 3.5–5.3)
POTASSIUM SERPL-MCNC: 4.4 MMOL/L — SIGNIFICANT CHANGE UP (ref 3.5–5.3)
POTASSIUM SERPL-MCNC: 4.6 MMOL/L — SIGNIFICANT CHANGE UP (ref 3.5–5.3)
POTASSIUM SERPL-MCNC: 4.6 MMOL/L — SIGNIFICANT CHANGE UP (ref 3.5–5.3)
POTASSIUM SERPL-SCNC: 4.3 MMOL/L — SIGNIFICANT CHANGE UP (ref 3.5–5.3)
POTASSIUM SERPL-SCNC: 4.3 MMOL/L — SIGNIFICANT CHANGE UP (ref 3.5–5.3)
POTASSIUM SERPL-SCNC: 4.4 MMOL/L — SIGNIFICANT CHANGE UP (ref 3.5–5.3)
POTASSIUM SERPL-SCNC: 4.4 MMOL/L — SIGNIFICANT CHANGE UP (ref 3.5–5.3)
POTASSIUM SERPL-SCNC: 4.6 MMOL/L — SIGNIFICANT CHANGE UP (ref 3.5–5.3)
POTASSIUM SERPL-SCNC: 4.6 MMOL/L — SIGNIFICANT CHANGE UP (ref 3.5–5.3)
PROT SERPL-MCNC: 6 G/DL — LOW (ref 6.6–8.7)
PROT SERPL-MCNC: 6 G/DL — LOW (ref 6.6–8.7)
PROTHROM AB SERPL-ACNC: 12.5 SEC — SIGNIFICANT CHANGE UP (ref 9.5–13)
PROTHROM AB SERPL-ACNC: 12.5 SEC — SIGNIFICANT CHANGE UP (ref 9.5–13)
RBC # BLD: 3.68 M/UL — LOW (ref 4.2–5.8)
RBC # BLD: 3.68 M/UL — LOW (ref 4.2–5.8)
RBC # FLD: 12.4 % — SIGNIFICANT CHANGE UP (ref 10.3–14.5)
RBC # FLD: 12.4 % — SIGNIFICANT CHANGE UP (ref 10.3–14.5)
SAO2 % BLDA: 98.7 % — HIGH (ref 94–98)
SAO2 % BLDA: 98.7 % — HIGH (ref 94–98)
SAO2 % BLDA: 99.2 % — HIGH (ref 94–98)
SAO2 % BLDA: 99.2 % — HIGH (ref 94–98)
SAO2 % BLDA: 99.4 % — HIGH (ref 94–98)
SAO2 % BLDA: 99.4 % — HIGH (ref 94–98)
SAO2 % BLDA: 99.5 % — HIGH (ref 94–98)
SAO2 % BLDA: 99.5 % — HIGH (ref 94–98)
SAO2 % BLDA: 99.7 % — HIGH (ref 94–98)
SAO2 % BLDA: 99.7 % — HIGH (ref 94–98)
SAO2 % BLDA: 99.8 % — HIGH (ref 94–98)
SAO2 % BLDA: 99.8 % — HIGH (ref 94–98)
SAO2 % BLDA: 99.9 % — HIGH (ref 94–98)
SAO2 % BLDV: 94.3 % — HIGH (ref 67–88)
SAO2 % BLDV: 94.3 % — HIGH (ref 67–88)
SAO2 % BLDV: 95.2 % — HIGH (ref 67–88)
SODIUM BLDA-SCNC: 132 MMOL/L — LOW (ref 136–145)
SODIUM BLDA-SCNC: 132 MMOL/L — LOW (ref 136–145)
SODIUM BLDA-SCNC: 133 MMOL/L — LOW (ref 136–145)
SODIUM BLDA-SCNC: 134 MMOL/L — LOW (ref 136–145)
SODIUM BLDA-SCNC: 135 MMOL/L — LOW (ref 136–145)
SODIUM BLDA-SCNC: 135 MMOL/L — LOW (ref 136–145)
SODIUM BLDA-SCNC: 137 MMOL/L — SIGNIFICANT CHANGE UP (ref 136–145)
SODIUM BLDA-SCNC: 137 MMOL/L — SIGNIFICANT CHANGE UP (ref 136–145)
SODIUM SERPL-SCNC: 136 MMOL/L — SIGNIFICANT CHANGE UP (ref 135–145)
SODIUM SERPL-SCNC: 136 MMOL/L — SIGNIFICANT CHANGE UP (ref 135–145)
SODIUM SERPL-SCNC: 140 MMOL/L — SIGNIFICANT CHANGE UP (ref 135–145)
SODIUM SERPL-SCNC: 140 MMOL/L — SIGNIFICANT CHANGE UP (ref 135–145)
TROPONIN T, HIGH SENSITIVITY RESULT: 175 NG/L — HIGH (ref 0–51)
TROPONIN T, HIGH SENSITIVITY RESULT: 175 NG/L — HIGH (ref 0–51)
WBC # BLD: 26.77 K/UL — HIGH (ref 3.8–10.5)
WBC # BLD: 26.77 K/UL — HIGH (ref 3.8–10.5)
WBC # FLD AUTO: 26.77 K/UL — HIGH (ref 3.8–10.5)
WBC # FLD AUTO: 26.77 K/UL — HIGH (ref 3.8–10.5)

## 2023-11-14 PROCEDURE — 33517 CABG ARTERY-VEIN SINGLE: CPT

## 2023-11-14 PROCEDURE — 86850 RBC ANTIBODY SCREEN: CPT

## 2023-11-14 PROCEDURE — 87086 URINE CULTURE/COLONY COUNT: CPT

## 2023-11-14 PROCEDURE — 71046 X-RAY EXAM CHEST 2 VIEWS: CPT

## 2023-11-14 PROCEDURE — 81003 URINALYSIS AUTO W/O SCOPE: CPT

## 2023-11-14 PROCEDURE — 86900 BLOOD TYPING SEROLOGIC ABO: CPT

## 2023-11-14 PROCEDURE — 85730 THROMBOPLASTIN TIME PARTIAL: CPT

## 2023-11-14 PROCEDURE — 86923 COMPATIBILITY TEST ELECTRIC: CPT

## 2023-11-14 PROCEDURE — 88305 TISSUE EXAM BY PATHOLOGIST: CPT | Mod: 26

## 2023-11-14 PROCEDURE — 99024 POSTOP FOLLOW-UP VISIT: CPT

## 2023-11-14 PROCEDURE — 86901 BLOOD TYPING SEROLOGIC RH(D): CPT

## 2023-11-14 PROCEDURE — 84443 ASSAY THYROID STIM HORMONE: CPT

## 2023-11-14 PROCEDURE — 84436 ASSAY OF TOTAL THYROXINE: CPT

## 2023-11-14 PROCEDURE — 33050 RESECT HEART SAC LESION: CPT | Mod: AS

## 2023-11-14 PROCEDURE — 80053 COMPREHEN METABOLIC PANEL: CPT

## 2023-11-14 PROCEDURE — 87640 STAPH A DNA AMP PROBE: CPT

## 2023-11-14 PROCEDURE — 93005 ELECTROCARDIOGRAM TRACING: CPT

## 2023-11-14 PROCEDURE — 33050 RESECT HEART SAC LESION: CPT

## 2023-11-14 PROCEDURE — 85025 COMPLETE CBC W/AUTO DIFF WBC: CPT

## 2023-11-14 PROCEDURE — G0463: CPT

## 2023-11-14 PROCEDURE — 87641 MR-STAPH DNA AMP PROBE: CPT

## 2023-11-14 PROCEDURE — 36415 COLL VENOUS BLD VENIPUNCTURE: CPT

## 2023-11-14 PROCEDURE — 33517 CABG ARTERY-VEIN SINGLE: CPT | Mod: AS

## 2023-11-14 PROCEDURE — 71045 X-RAY EXAM CHEST 1 VIEW: CPT | Mod: 26

## 2023-11-14 PROCEDURE — 85610 PROTHROMBIN TIME: CPT

## 2023-11-14 PROCEDURE — 33508 ENDOSCOPIC VEIN HARVEST: CPT | Mod: 59

## 2023-11-14 PROCEDURE — 83036 HEMOGLOBIN GLYCOSYLATED A1C: CPT

## 2023-11-14 PROCEDURE — 33533 CABG ARTERIAL SINGLE: CPT | Mod: AS

## 2023-11-14 PROCEDURE — 93010 ELECTROCARDIOGRAM REPORT: CPT

## 2023-11-14 PROCEDURE — 83880 ASSAY OF NATRIURETIC PEPTIDE: CPT

## 2023-11-14 PROCEDURE — 33533 CABG ARTERIAL SINGLE: CPT

## 2023-11-14 PROCEDURE — 84134 ASSAY OF PREALBUMIN: CPT

## 2023-11-14 PROCEDURE — 84480 ASSAY TRIIODOTHYRONINE (T3): CPT

## 2023-11-14 DEVICE — SURGICEL FIBRILLAR 4 X 4": Type: IMPLANTABLE DEVICE | Status: FUNCTIONAL

## 2023-11-14 DEVICE — CANNULA ARTERIAL STRAIGHT 20FR X 3/8" VENTED: Type: IMPLANTABLE DEVICE | Status: FUNCTIONAL

## 2023-11-14 DEVICE — BONE WAX 2.5GM: Type: IMPLANTABLE DEVICE | Status: FUNCTIONAL

## 2023-11-14 DEVICE — CANNULA VENOUS 2 STAGE THIN FLEX 29/29FR X 3/8" NON-VENTED: Type: IMPLANTABLE DEVICE | Status: FUNCTIONAL

## 2023-11-14 DEVICE — LIGATING CLIPS WECK HORIZON MEDIUM (BLUE) 24: Type: IMPLANTABLE DEVICE | Status: FUNCTIONAL

## 2023-11-14 DEVICE — OCCLUDER INTERNAL VESSEL FLO-RESTER 1 X 12MM: Type: IMPLANTABLE DEVICE | Status: FUNCTIONAL

## 2023-11-14 DEVICE — MEDIASTINAL CATH DRAIN 9MM: Type: IMPLANTABLE DEVICE | Status: FUNCTIONAL

## 2023-11-14 DEVICE — CANNULA AORTIC ROOT 12G X 14CM FLANGED: Type: IMPLANTABLE DEVICE | Status: FUNCTIONAL

## 2023-11-14 DEVICE — KIT A-LINE 1LUM 20G X 12CM SAFE KIT: Type: IMPLANTABLE DEVICE | Status: FUNCTIONAL

## 2023-11-14 DEVICE — CANNULA VESSEL 3MM BLUNT TIP CLEAR 1-WAY VALVE: Type: IMPLANTABLE DEVICE | Status: FUNCTIONAL

## 2023-11-14 DEVICE — KIT CVC 2LUM MAC 9FR CHG: Type: IMPLANTABLE DEVICE | Status: FUNCTIONAL

## 2023-11-14 RX ORDER — GABAPENTIN 400 MG/1
100 CAPSULE ORAL EVERY 8 HOURS
Refills: 0 | Status: COMPLETED | OUTPATIENT
Start: 2023-11-14 | End: 2023-11-19

## 2023-11-14 RX ORDER — ACETAMINOPHEN 500 MG
650 TABLET ORAL EVERY 6 HOURS
Refills: 0 | Status: DISCONTINUED | OUTPATIENT
Start: 2023-11-18 | End: 2023-11-19

## 2023-11-14 RX ORDER — HYDROMORPHONE HYDROCHLORIDE 2 MG/ML
0.5 INJECTION INTRAMUSCULAR; INTRAVENOUS; SUBCUTANEOUS
Refills: 0 | Status: DISCONTINUED | OUTPATIENT
Start: 2023-11-14 | End: 2023-11-15

## 2023-11-14 RX ORDER — ACETAMINOPHEN 500 MG
1000 TABLET ORAL ONCE
Refills: 0 | Status: COMPLETED | OUTPATIENT
Start: 2023-11-14 | End: 2023-11-14

## 2023-11-14 RX ORDER — FENTANYL CITRATE 50 UG/ML
50 INJECTION INTRAVENOUS ONCE
Refills: 0 | Status: DISCONTINUED | OUTPATIENT
Start: 2023-11-14 | End: 2023-11-14

## 2023-11-14 RX ORDER — ACETAMINOPHEN 500 MG
975 TABLET ORAL EVERY 6 HOURS
Refills: 0 | Status: COMPLETED | OUTPATIENT
Start: 2023-11-15 | End: 2023-11-18

## 2023-11-14 RX ORDER — ASCORBIC ACID 60 MG
500 TABLET,CHEWABLE ORAL
Refills: 0 | Status: COMPLETED | OUTPATIENT
Start: 2023-11-14 | End: 2023-11-19

## 2023-11-14 RX ORDER — CEFUROXIME AXETIL 250 MG
1500 TABLET ORAL ONCE
Refills: 0 | Status: DISCONTINUED | OUTPATIENT
Start: 2023-11-14 | End: 2023-11-14

## 2023-11-14 RX ORDER — NOREPINEPHRINE BITARTRATE/D5W 8 MG/250ML
0.05 PLASTIC BAG, INJECTION (ML) INTRAVENOUS
Qty: 8 | Refills: 0 | Status: DISCONTINUED | OUTPATIENT
Start: 2023-11-14 | End: 2023-11-15

## 2023-11-14 RX ORDER — ATORVASTATIN CALCIUM 80 MG/1
80 TABLET, FILM COATED ORAL AT BEDTIME
Refills: 0 | Status: DISCONTINUED | OUTPATIENT
Start: 2023-11-14 | End: 2023-11-19

## 2023-11-14 RX ORDER — DEXTROSE 50 % IN WATER 50 %
50 SYRINGE (ML) INTRAVENOUS
Refills: 0 | Status: DISCONTINUED | OUTPATIENT
Start: 2023-11-14 | End: 2023-11-19

## 2023-11-14 RX ORDER — POTASSIUM CHLORIDE 20 MEQ
10 PACKET (EA) ORAL
Refills: 0 | Status: DISCONTINUED | OUTPATIENT
Start: 2023-11-14 | End: 2023-11-15

## 2023-11-14 RX ORDER — CEFUROXIME AXETIL 250 MG
1500 TABLET ORAL EVERY 8 HOURS
Refills: 0 | Status: COMPLETED | OUTPATIENT
Start: 2023-11-14 | End: 2023-11-16

## 2023-11-14 RX ORDER — SODIUM CHLORIDE 9 MG/ML
500 INJECTION, SOLUTION INTRAVENOUS
Refills: 0 | Status: DISCONTINUED | OUTPATIENT
Start: 2023-11-14 | End: 2023-11-16

## 2023-11-14 RX ORDER — ASPIRIN/CALCIUM CARB/MAGNESIUM 324 MG
81 TABLET ORAL ONCE
Refills: 0 | Status: COMPLETED | OUTPATIENT
Start: 2023-11-14 | End: 2023-11-14

## 2023-11-14 RX ORDER — SENNA PLUS 8.6 MG/1
2 TABLET ORAL AT BEDTIME
Refills: 0 | Status: DISCONTINUED | OUTPATIENT
Start: 2023-11-15 | End: 2023-11-19

## 2023-11-14 RX ORDER — VANCOMYCIN HCL 1 G
1000 VIAL (EA) INTRAVENOUS EVERY 12 HOURS
Refills: 0 | Status: COMPLETED | OUTPATIENT
Start: 2023-11-14 | End: 2023-11-16

## 2023-11-14 RX ORDER — CHLORHEXIDINE GLUCONATE 213 G/1000ML
1 SOLUTION TOPICAL DAILY
Refills: 0 | Status: DISCONTINUED | OUTPATIENT
Start: 2023-11-14 | End: 2023-11-18

## 2023-11-14 RX ORDER — HYDROMORPHONE HYDROCHLORIDE 2 MG/ML
0.5 INJECTION INTRAMUSCULAR; INTRAVENOUS; SUBCUTANEOUS ONCE
Refills: 0 | Status: DISCONTINUED | OUTPATIENT
Start: 2023-11-14 | End: 2023-11-14

## 2023-11-14 RX ORDER — VANCOMYCIN HCL 1 G
1500 VIAL (EA) INTRAVENOUS EVERY 12 HOURS
Refills: 0 | Status: DISCONTINUED | OUTPATIENT
Start: 2023-11-14 | End: 2023-11-14

## 2023-11-14 RX ORDER — CHLORHEXIDINE GLUCONATE 213 G/1000ML
5 SOLUTION TOPICAL
Refills: 0 | Status: DISCONTINUED | OUTPATIENT
Start: 2023-11-14 | End: 2023-11-14

## 2023-11-14 RX ORDER — PANTOPRAZOLE SODIUM 20 MG/1
40 TABLET, DELAYED RELEASE ORAL ONCE
Refills: 0 | Status: COMPLETED | OUTPATIENT
Start: 2023-11-14 | End: 2023-11-14

## 2023-11-14 RX ORDER — INSULIN HUMAN 100 [IU]/ML
2 INJECTION, SOLUTION SUBCUTANEOUS
Qty: 250 | Refills: 0 | Status: DISCONTINUED | OUTPATIENT
Start: 2023-11-14 | End: 2023-11-15

## 2023-11-14 RX ORDER — OXYCODONE HYDROCHLORIDE 5 MG/1
10 TABLET ORAL EVERY 4 HOURS
Refills: 0 | Status: DISCONTINUED | OUTPATIENT
Start: 2023-11-15 | End: 2023-11-19

## 2023-11-14 RX ORDER — DEXMEDETOMIDINE HYDROCHLORIDE IN 0.9% SODIUM CHLORIDE 4 UG/ML
0.3 INJECTION INTRAVENOUS
Qty: 200 | Refills: 0 | Status: DISCONTINUED | OUTPATIENT
Start: 2023-11-14 | End: 2023-11-14

## 2023-11-14 RX ORDER — SODIUM CHLORIDE 9 MG/ML
1000 INJECTION INTRAMUSCULAR; INTRAVENOUS; SUBCUTANEOUS
Refills: 0 | Status: DISCONTINUED | OUTPATIENT
Start: 2023-11-14 | End: 2023-11-16

## 2023-11-14 RX ORDER — OXYCODONE HYDROCHLORIDE 5 MG/1
5 TABLET ORAL EVERY 4 HOURS
Refills: 0 | Status: DISCONTINUED | OUTPATIENT
Start: 2023-11-15 | End: 2023-11-19

## 2023-11-14 RX ORDER — PANTOPRAZOLE SODIUM 20 MG/1
40 TABLET, DELAYED RELEASE ORAL DAILY
Refills: 0 | Status: DISCONTINUED | OUTPATIENT
Start: 2023-11-15 | End: 2023-11-19

## 2023-11-14 RX ORDER — ALBUMIN HUMAN 25 %
250 VIAL (ML) INTRAVENOUS
Refills: 0 | Status: COMPLETED | OUTPATIENT
Start: 2023-11-14 | End: 2023-11-14

## 2023-11-14 RX ORDER — SODIUM CHLORIDE 9 MG/ML
3 INJECTION INTRAMUSCULAR; INTRAVENOUS; SUBCUTANEOUS EVERY 8 HOURS
Refills: 0 | Status: DISCONTINUED | OUTPATIENT
Start: 2023-11-14 | End: 2023-11-14

## 2023-11-14 RX ORDER — DEXTROSE 50 % IN WATER 50 %
25 SYRINGE (ML) INTRAVENOUS
Refills: 0 | Status: DISCONTINUED | OUTPATIENT
Start: 2023-11-14 | End: 2023-11-19

## 2023-11-14 RX ORDER — POLYETHYLENE GLYCOL 3350 17 G/17G
17 POWDER, FOR SOLUTION ORAL DAILY
Refills: 0 | Status: DISCONTINUED | OUTPATIENT
Start: 2023-11-15 | End: 2023-11-19

## 2023-11-14 RX ORDER — ONDANSETRON 8 MG/1
4 TABLET, FILM COATED ORAL EVERY 6 HOURS
Refills: 0 | Status: DISCONTINUED | OUTPATIENT
Start: 2023-11-14 | End: 2023-11-19

## 2023-11-14 RX ORDER — ASPIRIN/CALCIUM CARB/MAGNESIUM 324 MG
81 TABLET ORAL DAILY
Refills: 0 | Status: DISCONTINUED | OUTPATIENT
Start: 2023-11-15 | End: 2023-11-19

## 2023-11-14 RX ORDER — CHLORHEXIDINE GLUCONATE 213 G/1000ML
15 SOLUTION TOPICAL EVERY 12 HOURS
Refills: 0 | Status: DISCONTINUED | OUTPATIENT
Start: 2023-11-14 | End: 2023-11-14

## 2023-11-14 RX ORDER — AMIODARONE HYDROCHLORIDE 400 MG/1
400 TABLET ORAL
Refills: 0 | Status: COMPLETED | OUTPATIENT
Start: 2023-11-14 | End: 2023-11-17

## 2023-11-14 RX ADMIN — Medication 10.6 MICROGRAM(S)/KG/MIN: at 15:42

## 2023-11-14 RX ADMIN — SODIUM CHLORIDE 2000 MILLILITER(S): 9 INJECTION, SOLUTION INTRAVENOUS at 15:44

## 2023-11-14 RX ADMIN — HYDROMORPHONE HYDROCHLORIDE 0.5 MILLIGRAM(S): 2 INJECTION INTRAMUSCULAR; INTRAVENOUS; SUBCUTANEOUS at 19:05

## 2023-11-14 RX ADMIN — Medication 1000 MILLIGRAM(S): at 16:42

## 2023-11-14 RX ADMIN — Medication 250 MILLIGRAM(S): at 19:06

## 2023-11-14 RX ADMIN — Medication 400 MILLIGRAM(S): at 15:42

## 2023-11-14 RX ADMIN — Medication 400 MILLIGRAM(S): at 21:07

## 2023-11-14 RX ADMIN — GABAPENTIN 100 MILLIGRAM(S): 400 CAPSULE ORAL at 21:06

## 2023-11-14 RX ADMIN — PANTOPRAZOLE SODIUM 40 MILLIGRAM(S): 20 TABLET, DELAYED RELEASE ORAL at 15:43

## 2023-11-14 RX ADMIN — HYDROMORPHONE HYDROCHLORIDE 0.5 MILLIGRAM(S): 2 INJECTION INTRAMUSCULAR; INTRAVENOUS; SUBCUTANEOUS at 16:20

## 2023-11-14 RX ADMIN — Medication 1000 MILLIGRAM(S): at 22:00

## 2023-11-14 RX ADMIN — HYDROMORPHONE HYDROCHLORIDE 0.5 MILLIGRAM(S): 2 INJECTION INTRAMUSCULAR; INTRAVENOUS; SUBCUTANEOUS at 17:20

## 2023-11-14 RX ADMIN — Medication 81 MILLIGRAM(S): at 19:02

## 2023-11-14 RX ADMIN — Medication 125 MILLILITER(S): at 20:50

## 2023-11-14 RX ADMIN — Medication 500 MILLIGRAM(S): at 19:01

## 2023-11-14 RX ADMIN — ATORVASTATIN CALCIUM 80 MILLIGRAM(S): 80 TABLET, FILM COATED ORAL at 21:25

## 2023-11-14 RX ADMIN — CHLORHEXIDINE GLUCONATE 1 APPLICATION(S): 213 SOLUTION TOPICAL at 15:44

## 2023-11-14 RX ADMIN — AMIODARONE HYDROCHLORIDE 400 MILLIGRAM(S): 400 TABLET ORAL at 19:02

## 2023-11-14 RX ADMIN — Medication 100 MILLIGRAM(S): at 16:44

## 2023-11-14 RX ADMIN — Medication 125 MILLILITER(S): at 15:43

## 2023-11-14 RX ADMIN — Medication 100 MILLIGRAM(S): at 23:39

## 2023-11-14 RX ADMIN — INSULIN HUMAN 2 UNIT(S)/HR: 100 INJECTION, SOLUTION SUBCUTANEOUS at 15:44

## 2023-11-14 RX ADMIN — HYDROMORPHONE HYDROCHLORIDE 0.5 MILLIGRAM(S): 2 INJECTION INTRAMUSCULAR; INTRAVENOUS; SUBCUTANEOUS at 19:35

## 2023-11-14 NOTE — BRIEF OPERATIVE NOTE - NSICDXBRIEFPOSTOP_GEN_ALL_CORE_FT
POST-OP DIAGNOSIS:  CAD (coronary artery disease) 14-Nov-2023 13:20:20  Erika Burgos  Pericardial cyst 14-Nov-2023 13:21:12  Erika Burgos

## 2023-11-14 NOTE — BRIEF OPERATIVE NOTE - NSICDXBRIEFPREOP_GEN_ALL_CORE_FT
PRE-OP DIAGNOSIS:  CAD (coronary artery disease) 14-Nov-2023 13:20:14  Erika Burgos  Pericardial cyst 14-Nov-2023 13:21:02  Erika Burgos

## 2023-11-14 NOTE — PROGRESS NOTE ADULT - SUBJECTIVE AND OBJECTIVE BOX
FELY ARANA  MRN-[id number]    HPI:  77 y/o male presents today to PST pending CABG x 3 with Dr. Tony Henao secondary to ASHD Native coronary artery w/o Angina Pectoris on 11/14/23.  Pt. states he felt pressure, he called his wife at work and the pressure resolved, he went to [facility], and he had testing done there, there were restrictions shown he states, he was told he needs a cardiac catheterization, he was admitted, and procedure revealed 2 blockages arteries, and a cyst in the back of the heart. Pt. states he has no further CP, or SOB. Pt. states when he walks a block he feels SOB, or with activity around the house and he has to stop and rest. Denies palpitations, lightheadedness or dizziness. History of HLD, HTN, prostate cancer -s/p radiation therapy.  (08 Nov 2023 07:43)      Surgery/Hospital Course:  ·  PRE-OP DIAGNOSIS:  CAD (coronary artery disease)   Pericardial cyst   ·  POST-OP DIAGNOSIS:  CAD (coronary artery disease)  Pericardial cyst   ·  PROCEDURES:  CABG, with CHARBEL 14-Nov-2023   Two vessel CABG (SVG-Ramus, LIMA-LAD)  Surgical removal of pericardial cyst   Today:  No acute events     ICU Vital Signs Last 24 Hrs  T(C): 36.7 (14 Nov 2023 06:15), Max: 36.7 (14 Nov 2023 06:15)  T(F): 98.1 (14 Nov 2023 06:15), Max: 98.1 (14 Nov 2023 06:15)  HR: 94 (14 Nov 2023 13:50) (72 - 94)  BP: 115/63 (14 Nov 2023 06:15) (115/63 - 115/63)  BP(mean): --  ABP: --  ABP(mean): --  RR: 16 (14 Nov 2023 06:15) (16 - 16)  SpO2: 98% (14 Nov 2023 13:50) (98% - 99%)        Physical Exam:  Gen: Sedated   CNS: non focal 	  Neck: no JVD  RES : clear , no wheezing              CVS: Regular  rhythm. Normal S1/S2  Abd: Soft, non-distended. Bowel sounds present.  Skin: No rash.  Ext:  no edema    ============================I/O===========================   I&O's Detail    ============================ LABS =========================    11-14    136  |  102  |  25.0<H>  ----------------------------<  113<H>  4.6   |  23.0  |  1.26    Ca    9.6      14 Nov 2023 06:50          ABG - ( 14 Nov 2023 14:00 )  pH, Arterial: 7.350 pH, Blood: x     /  pCO2: 47    /  pO2: 125   / HCO3: 26    / Base Excess: 0.3   /  SaO2: 99.0              Urinalysis Basic - ( 14 Nov 2023 06:50 )    Color: x / Appearance: x / SG: x / pH: x  Gluc: 113 mg/dL / Ketone: x  / Bili: x / Urobili: x   Blood: x / Protein: x / Nitrite: x   Leuk Esterase: x / RBC: x / WBC x   Sq Epi: x / Non Sq Epi: x / Bacteria: x      ======================Micro/Rad/Cardio=================  Culture: Reviewed   CXR: Reviewed  Echo:Reviewed  ======================================================  PAST MEDICAL & SURGICAL HISTORY:  Liver mass      Hyperlipidemia      Hypertension      Arteriosclerotic heart disease (ASHD)      Prostate cancer      History of radiation therapy      COVID-19 vaccine series completed      [place] (hard of hearing)      H/o Lyme disease      Former smoker      H/O varicose veins      Lumbar herniated disc      H/O total knee replacement, left      H/O removal of cyst      H/O resection of liver        ====================ASSESSMENT ==============  77 y/o male presents today to PST pending CABG x 3 with Dr. Tony Henao secondary to ASHD Native coronary artery w/o Angina Pectoris on 11/14/23.  Pt. states he felt pressure, he called his wife at work and the pressure resolved, he went to [facility], and he had testing done there, there were restrictions shown he states, he was told he needs a cardiac catheterization, he was admitted, and procedure revealed 2 blockages arteries, and a cyst in the back of the heart. Pt. states he has no further CP, or SOB. Pt. states when he walks a block he feels SOB, or with activity around the house and he has to stop and rest. Denies palpitations, lightheadedness or dizziness. History of HLD, HTN, prostate cancer -s/p radiation therapy.  (08 Nov 2023 07:43)    ---Liver mass  ---Hyperlipidemia  ---Hypertension  ---Prostate cancer  ---H/O varicose veins  ---H/O resection of liver  ---CAD (coronary artery disease)  ---Pericardial cyst   ---S/p CABG, with CHARBEL 14-Nov-2023              Two vessel CABG (SVG-Ramus, LIMA-LAD)             Surgical removal of pericardial cyst   ---Post op Hypovolemia  ---Post op respiratory insufficiency       Plan:  ====================== NEUROLOGY=====================  acetaminophen   IVPB .. 1000 milliGRAM(s) IV Intermittent once PRN Mild Pain (1 - 3), Moderate Pain (4 - 6), Severe Pain (7 - 10)  dexMEDEtomidine Infusion 0.3 MICROgram(s)/kG/Hr (8.51 mL/Hr) IV Continuous <Continuous>  fentaNYL    Injectable 50 MICROGram(s) IV Push once PRN Severe Pain (7 - 10)  gabapentin 100 milliGRAM(s) Oral every 8 hours    ==================== RESPIRATORY======================  Post op respiratory insufficiency  Mechanical Ventilation:  Mode: AC/ CMV (Assist Control/ Continuous Mandatory Ventilation)  RR (machine): 12  TV (machine): 500  FiO2: 60  PEEP: 6  ITime: 1  MAP: 11  PIP: 32      ====================CARDIOVASCULAR==================  Post op Hypovolemia  aMIOdarone    Tablet 400 milliGRAM(s) Oral two times a day  norepinephrine Infusion 0.05 MICROgram(s)/kG/Min (10.6 mL/Hr) IV Continuous <Continuous>    ===================HEMATOLOGIC/ONC ===================  Monitor H&H/Plts    aspirin  chewable 81 milliGRAM(s) Oral once    ===================== RENAL =========================  Continue monitoring urine output, I&OS, BUN/Cr     ==================== GASTROINTESTINAL===================  albumin human  5% IVPB 250 milliLiter(s) IV Intermittent every 30 minutes PRN hypovolemia  ascorbic acid 500 milliGRAM(s) Oral two times a day  lactated ringers Bolus 500 milliLiter(s) IV Bolus every 30 minutes PRN hypovolemia  pantoprazole  Injectable 40 milliGRAM(s) IV Push once  potassium chloride  10 mEq/50 mL IVPB 10 milliEquivalent(s) IV Intermittent every 1 hour  potassium chloride  10 mEq/50 mL IVPB 10 milliEquivalent(s) IV Intermittent every 1 hour  potassium chloride  10 mEq/50 mL IVPB 10 milliEquivalent(s) IV Intermittent every 1 hour  sodium chloride 0.9%. 1000 milliLiter(s) (5 mL/Hr) IV Continuous <Continuous>  sodium chloride 0.9%. 1000 milliLiter(s) (10 mL/Hr) IV Continuous <Continuous>    =======================    ENDOCRINE  =====================  dextrose 50% Injectable 25 milliLiter(s) IV Push every 15 minutes  dextrose 50% Injectable 50 milliLiter(s) IV Push every 15 minutes  insulin regular Infusion 2 Unit(s)/Hr (2 mL/Hr) IV Continuous <Continuous>    ========================INFECTIOUS DISEASE================  cefuroxime  IVPB 1500 milliGRAM(s) IV Intermittent every 8 hours  vancomycin  IVPB 1000 milliGRAM(s) IV Intermittent every 12 hours      -Close hemodynamic , ventilatory and drain monitoring and management per post op routine .  -Monitor Neurologic status ,   -Head of the bed should remain elevated to 45 degrees,  -Monitor adequacy of oxygenation and ventilation and attempt to wean oxygen ,  -Monitor for arrhythmias and monitor parameters for organ perfusion,  -Glycemic control is satisfactory,  -Nutritional goals will be met using po eventually , insure adequate caloric intake and monitor the same ,  -Electrolytes have been repleted as necessary , pain control has been achieved  and wound care has been carried out ,  -Stress ulcer and VTE prophylaxis will be achieved,  -Agressive PT and early mobility and ambulation goals will be met,  -The family was updated about the course and plan .      I have spent 35 minutes providing acute care for this critically ill patient     Patient requires continuous monitoring with bedside rhythm monitoring, pulse ox monitoring, and intermittent blood gas analysis. Care plan discussed with ICU care team. Patient remained critical and at risk for life threatening decompensation.            FELY ARANA  MRN-[id number]    HPI:  77 y/o male presents today to PST pending CABG x 3 with Dr. Tony Henao secondary to ASHD Native coronary artery w/o Angina Pectoris on 11/14/23.  Pt. states he felt pressure, he called his wife at work and the pressure resolved, he went to [facility], and he had testing done there, there were restrictions shown he states, he was told he needs a cardiac catheterization, he was admitted, and procedure revealed 2 blockages arteries, and a cyst in the back of the heart. Pt. states he has no further CP, or SOB. Pt. states when he walks a block he feels SOB, or with activity around the house and he has to stop and rest. Denies palpitations, lightheadedness or dizziness. History of HLD, HTN, prostate cancer -s/p radiation therapy.  (08 Nov 2023 07:43)      Surgery/Hospital Course:  ·  PRE-OP DIAGNOSIS:  CAD (coronary artery disease)   Pericardial cyst   ·  POST-OP DIAGNOSIS:  CAD (coronary artery disease)  Pericardial cyst   ·  PROCEDURES:  CABG, with CHARBEL 14-Nov-2023   Two vessel CABG (SVG-Ramus, LIMA-LAD)  Surgical removal of pericardial cyst   Today:  No acute events     ICU Vital Signs Last 24 Hrs  T(C): 36.7 (14 Nov 2023 06:15), Max: 36.7 (14 Nov 2023 06:15)  T(F): 98.1 (14 Nov 2023 06:15), Max: 98.1 (14 Nov 2023 06:15)  HR: 94 (14 Nov 2023 13:50) (72 - 94)  BP: 115/63 (14 Nov 2023 06:15) (115/63 - 115/63)  BP(mean): --  ABP: --  ABP(mean): --  RR: 16 (14 Nov 2023 06:15) (16 - 16)  SpO2: 98% (14 Nov 2023 13:50) (98% - 99%)        Physical Exam:  Gen: Sedated   CNS: non focal 	  Neck: no JVD  RES : clear , no wheezing              CVS: Regular  rhythm. Normal S1/S2  Abd: Soft, non-distended. Bowel sounds present.  Skin: No rash.  Ext:  no edema    ============================I/O===========================   I&O's Detail    ============================ LABS =========================    11-14    136  |  102  |  25.0<H>  ----------------------------<  113<H>  4.6   |  23.0  |  1.26    Ca    9.6      14 Nov 2023 06:50          ABG - ( 14 Nov 2023 14:00 )  pH, Arterial: 7.350 pH, Blood: x     /  pCO2: 47    /  pO2: 125   / HCO3: 26    / Base Excess: 0.3   /  SaO2: 99.0              Urinalysis Basic - ( 14 Nov 2023 06:50 )    Color: x / Appearance: x / SG: x / pH: x  Gluc: 113 mg/dL / Ketone: x  / Bili: x / Urobili: x   Blood: x / Protein: x / Nitrite: x   Leuk Esterase: x / RBC: x / WBC x   Sq Epi: x / Non Sq Epi: x / Bacteria: x      ======================Micro/Rad/Cardio=================  Culture: Reviewed   CXR: Reviewed  Echo:Reviewed  ======================================================  PAST MEDICAL & SURGICAL HISTORY:  Liver mass      Hyperlipidemia      Hypertension      Arteriosclerotic heart disease (ASHD)      Prostate cancer      History of radiation therapy      COVID-19 vaccine series completed      [place] (hard of hearing)      H/o Lyme disease      Former smoker      H/O varicose veins      Lumbar herniated disc      H/O total knee replacement, left      H/O removal of cyst      H/O resection of liver        ====================ASSESSMENT ==============  77 y/o male presents today to PST pending CABG x 3 with Dr. Tony Henao secondary to ASHD Native coronary artery w/o Angina Pectoris on 11/14/23.  Pt. states he felt pressure, he called his wife at work and the pressure resolved, he went to [facility], and he had testing done there, there were restrictions shown he states, he was told he needs a cardiac catheterization, he was admitted, and procedure revealed 2 blockages arteries, and a cyst in the back of the heart. Pt. states he has no further CP, or SOB. Pt. states when he walks a block he feels SOB, or with activity around the house and he has to stop and rest. Denies palpitations, lightheadedness or dizziness. History of HLD, HTN, prostate cancer -s/p radiation therapy.  (08 Nov 2023 07:43)    ---Liver mass  ---Hyperlipidemia  ---Hypertension  ---Prostate cancer  ---H/O varicose veins  ---H/O resection of liver  ---CAD (coronary artery disease)  ---Pericardial cyst   ---S/p CABG, with CHARBEL 14-Nov-2023              Two vessel CABG (SVG-Ramus, LIMA-LAD)             Surgical removal of pericardial cyst   ---Post op Hypovolemic shock  ---Post op respiratory insufficiency       Plan:  ====================== NEUROLOGY=====================  acetaminophen   IVPB .. 1000 milliGRAM(s) IV Intermittent once PRN Mild Pain (1 - 3), Moderate Pain (4 - 6), Severe Pain (7 - 10)  dexMEDEtomidine Infusion 0.3 MICROgram(s)/kG/Hr (8.51 mL/Hr) IV Continuous <Continuous>  fentaNYL    Injectable 50 MICROGram(s) IV Push once PRN Severe Pain (7 - 10)  gabapentin 100 milliGRAM(s) Oral every 8 hours    ==================== RESPIRATORY======================  Post op respiratory insufficiency  Mechanical Ventilation:  Mode: AC/ CMV (Assist Control/ Continuous Mandatory Ventilation)  RR (machine): 12  TV (machine): 500  FiO2: 60  PEEP: 6  ITime: 1  MAP: 11  PIP: 32      ====================CARDIOVASCULAR==================  Post op Hypovolemia  aMIOdarone    Tablet 400 milliGRAM(s) Oral two times a day  norepinephrine Infusion 0.05 MICROgram(s)/kG/Min (10.6 mL/Hr) IV Continuous <Continuous>    ===================HEMATOLOGIC/ONC ===================  Monitor H&H/Plts    aspirin  chewable 81 milliGRAM(s) Oral once    ===================== RENAL =========================  Continue monitoring urine output, I&OS, BUN/Cr     ==================== GASTROINTESTINAL===================  albumin human  5% IVPB 250 milliLiter(s) IV Intermittent every 30 minutes PRN hypovolemia  ascorbic acid 500 milliGRAM(s) Oral two times a day  lactated ringers Bolus 500 milliLiter(s) IV Bolus every 30 minutes PRN hypovolemia  pantoprazole  Injectable 40 milliGRAM(s) IV Push once  potassium chloride  10 mEq/50 mL IVPB 10 milliEquivalent(s) IV Intermittent every 1 hour  potassium chloride  10 mEq/50 mL IVPB 10 milliEquivalent(s) IV Intermittent every 1 hour  potassium chloride  10 mEq/50 mL IVPB 10 milliEquivalent(s) IV Intermittent every 1 hour  sodium chloride 0.9%. 1000 milliLiter(s) (5 mL/Hr) IV Continuous <Continuous>  sodium chloride 0.9%. 1000 milliLiter(s) (10 mL/Hr) IV Continuous <Continuous>    =======================    ENDOCRINE  =====================  dextrose 50% Injectable 25 milliLiter(s) IV Push every 15 minutes  dextrose 50% Injectable 50 milliLiter(s) IV Push every 15 minutes  insulin regular Infusion 2 Unit(s)/Hr (2 mL/Hr) IV Continuous <Continuous>    ========================INFECTIOUS DISEASE================  cefuroxime  IVPB 1500 milliGRAM(s) IV Intermittent every 8 hours  vancomycin  IVPB 1000 milliGRAM(s) IV Intermittent every 12 hours      -Close hemodynamic , ventilatory and drain monitoring and management per post op routine .  -Monitor Neurologic status ,   -Head of the bed should remain elevated to 45 degrees,  -Monitor adequacy of oxygenation and ventilation and attempt to wean oxygen ,  -Monitor for arrhythmias and monitor parameters for organ perfusion,  -Glycemic control is satisfactory,  -Nutritional goals will be met using po eventually , insure adequate caloric intake and monitor the same ,  -Electrolytes have been repleted as necessary , pain control has been achieved  and wound care has been carried out ,  -Stress ulcer and VTE prophylaxis will be achieved,  -Agressive PT and early mobility and ambulation goals will be met,  -The family was updated about the course and plan .      I have spent 35 minutes providing acute care for this critically ill patient     Patient requires continuous monitoring with bedside rhythm monitoring, pulse ox monitoring, and intermittent blood gas analysis. Care plan discussed with ICU care team. Patient remained critical and at risk for life threatening decompensation.

## 2023-11-14 NOTE — BRIEF OPERATIVE NOTE - NSICDXBRIEFPROCEDURE_GEN_ALL_CORE_FT
PROCEDURES:  CABG, with CHARBEL 14-Nov-2023 13:20:07 Two vessel CABG (SVG-Ramus, ZAMBRANO-LAD) Erika Burgos  Surgical removal of pericardial cyst 14-Nov-2023 13:20:54  Erika Burgos

## 2023-11-14 NOTE — ASU PREOP CHECKLIST - HEART RATE (BEATS/MIN)
Quality 226: Preventive Care And Screening: Tobacco Use: Screening And Cessation Intervention: Patient screened for tobacco use and is an ex/non-smoker Detail Level: Detailed Quality 431: Preventive Care And Screening: Unhealthy Alcohol Use - Screening: Patient not identified as an unhealthy alcohol user when screened for unhealthy alcohol use using a systematic screening method 72

## 2023-11-15 DIAGNOSIS — I25.10 ATHEROSCLEROTIC HEART DISEASE OF NATIVE CORONARY ARTERY WITHOUT ANGINA PECTORIS: ICD-10-CM

## 2023-11-15 DIAGNOSIS — Q24.8 OTHER SPECIFIED CONGENITAL MALFORMATIONS OF HEART: ICD-10-CM

## 2023-11-15 DIAGNOSIS — Z29.9 ENCOUNTER FOR PROPHYLACTIC MEASURES, UNSPECIFIED: ICD-10-CM

## 2023-11-15 LAB
ALBUMIN SERPL ELPH-MCNC: 3.8 G/DL — SIGNIFICANT CHANGE UP (ref 3.3–5.2)
ALBUMIN SERPL ELPH-MCNC: 3.8 G/DL — SIGNIFICANT CHANGE UP (ref 3.3–5.2)
ALP SERPL-CCNC: 44 U/L — SIGNIFICANT CHANGE UP (ref 40–120)
ALP SERPL-CCNC: 44 U/L — SIGNIFICANT CHANGE UP (ref 40–120)
ALT FLD-CCNC: 15 U/L — SIGNIFICANT CHANGE UP
ALT FLD-CCNC: 15 U/L — SIGNIFICANT CHANGE UP
ANION GAP SERPL CALC-SCNC: 12 MMOL/L — SIGNIFICANT CHANGE UP (ref 5–17)
ANION GAP SERPL CALC-SCNC: 12 MMOL/L — SIGNIFICANT CHANGE UP (ref 5–17)
APTT BLD: 25.4 SEC — SIGNIFICANT CHANGE UP (ref 24.5–35.6)
APTT BLD: 25.4 SEC — SIGNIFICANT CHANGE UP (ref 24.5–35.6)
AST SERPL-CCNC: 24 U/L — SIGNIFICANT CHANGE UP
AST SERPL-CCNC: 24 U/L — SIGNIFICANT CHANGE UP
BILIRUB SERPL-MCNC: 0.6 MG/DL — SIGNIFICANT CHANGE UP (ref 0.4–2)
BILIRUB SERPL-MCNC: 0.6 MG/DL — SIGNIFICANT CHANGE UP (ref 0.4–2)
BUN SERPL-MCNC: 22 MG/DL — HIGH (ref 8–20)
BUN SERPL-MCNC: 22 MG/DL — HIGH (ref 8–20)
CALCIUM SERPL-MCNC: 8.7 MG/DL — SIGNIFICANT CHANGE UP (ref 8.4–10.5)
CALCIUM SERPL-MCNC: 8.7 MG/DL — SIGNIFICANT CHANGE UP (ref 8.4–10.5)
CHLORIDE SERPL-SCNC: 102 MMOL/L — SIGNIFICANT CHANGE UP (ref 96–108)
CHLORIDE SERPL-SCNC: 102 MMOL/L — SIGNIFICANT CHANGE UP (ref 96–108)
CK MB CFR SERPL CALC: 11.6 NG/ML — HIGH (ref 0–6.7)
CK MB CFR SERPL CALC: 11.6 NG/ML — HIGH (ref 0–6.7)
CK SERPL-CCNC: 465 U/L — HIGH (ref 30–200)
CK SERPL-CCNC: 465 U/L — HIGH (ref 30–200)
CO2 SERPL-SCNC: 24 MMOL/L — SIGNIFICANT CHANGE UP (ref 22–29)
CO2 SERPL-SCNC: 24 MMOL/L — SIGNIFICANT CHANGE UP (ref 22–29)
CREAT SERPL-MCNC: 1.05 MG/DL — SIGNIFICANT CHANGE UP (ref 0.5–1.3)
CREAT SERPL-MCNC: 1.05 MG/DL — SIGNIFICANT CHANGE UP (ref 0.5–1.3)
EGFR: 73 ML/MIN/1.73M2 — SIGNIFICANT CHANGE UP
EGFR: 73 ML/MIN/1.73M2 — SIGNIFICANT CHANGE UP
GLUCOSE BLDC GLUCOMTR-MCNC: 114 MG/DL — HIGH (ref 70–99)
GLUCOSE BLDC GLUCOMTR-MCNC: 114 MG/DL — HIGH (ref 70–99)
GLUCOSE BLDC GLUCOMTR-MCNC: 118 MG/DL — HIGH (ref 70–99)
GLUCOSE BLDC GLUCOMTR-MCNC: 118 MG/DL — HIGH (ref 70–99)
GLUCOSE BLDC GLUCOMTR-MCNC: 130 MG/DL — HIGH (ref 70–99)
GLUCOSE BLDC GLUCOMTR-MCNC: 132 MG/DL — HIGH (ref 70–99)
GLUCOSE BLDC GLUCOMTR-MCNC: 132 MG/DL — HIGH (ref 70–99)
GLUCOSE BLDC GLUCOMTR-MCNC: 133 MG/DL — HIGH (ref 70–99)
GLUCOSE BLDC GLUCOMTR-MCNC: 133 MG/DL — HIGH (ref 70–99)
GLUCOSE BLDC GLUCOMTR-MCNC: 135 MG/DL — HIGH (ref 70–99)
GLUCOSE BLDC GLUCOMTR-MCNC: 135 MG/DL — HIGH (ref 70–99)
GLUCOSE BLDC GLUCOMTR-MCNC: 142 MG/DL — HIGH (ref 70–99)
GLUCOSE BLDC GLUCOMTR-MCNC: 142 MG/DL — HIGH (ref 70–99)
GLUCOSE BLDC GLUCOMTR-MCNC: 150 MG/DL — HIGH (ref 70–99)
GLUCOSE BLDC GLUCOMTR-MCNC: 150 MG/DL — HIGH (ref 70–99)
GLUCOSE BLDC GLUCOMTR-MCNC: 151 MG/DL — HIGH (ref 70–99)
GLUCOSE BLDC GLUCOMTR-MCNC: 151 MG/DL — HIGH (ref 70–99)
GLUCOSE BLDC GLUCOMTR-MCNC: 152 MG/DL — HIGH (ref 70–99)
GLUCOSE BLDC GLUCOMTR-MCNC: 152 MG/DL — HIGH (ref 70–99)
GLUCOSE BLDC GLUCOMTR-MCNC: 157 MG/DL — HIGH (ref 70–99)
GLUCOSE BLDC GLUCOMTR-MCNC: 157 MG/DL — HIGH (ref 70–99)
GLUCOSE BLDC GLUCOMTR-MCNC: 168 MG/DL — HIGH (ref 70–99)
GLUCOSE BLDC GLUCOMTR-MCNC: 168 MG/DL — HIGH (ref 70–99)
GLUCOSE SERPL-MCNC: 144 MG/DL — HIGH (ref 70–99)
GLUCOSE SERPL-MCNC: 144 MG/DL — HIGH (ref 70–99)
HCT VFR BLD CALC: 28.9 % — LOW (ref 39–50)
HCT VFR BLD CALC: 28.9 % — LOW (ref 39–50)
HGB BLD-MCNC: 10.2 G/DL — LOW (ref 13–17)
HGB BLD-MCNC: 10.2 G/DL — LOW (ref 13–17)
INR BLD: 1.12 RATIO — SIGNIFICANT CHANGE UP (ref 0.85–1.18)
INR BLD: 1.12 RATIO — SIGNIFICANT CHANGE UP (ref 0.85–1.18)
LACTATE SERPL-SCNC: 2.3 MMOL/L — HIGH (ref 0.5–2)
LACTATE SERPL-SCNC: 2.3 MMOL/L — HIGH (ref 0.5–2)
MAGNESIUM SERPL-MCNC: 2.3 MG/DL — SIGNIFICANT CHANGE UP (ref 1.6–2.6)
MAGNESIUM SERPL-MCNC: 2.3 MG/DL — SIGNIFICANT CHANGE UP (ref 1.6–2.6)
MCHC RBC-ENTMCNC: 31.5 PG — SIGNIFICANT CHANGE UP (ref 27–34)
MCHC RBC-ENTMCNC: 31.5 PG — SIGNIFICANT CHANGE UP (ref 27–34)
MCHC RBC-ENTMCNC: 35.3 GM/DL — SIGNIFICANT CHANGE UP (ref 32–36)
MCHC RBC-ENTMCNC: 35.3 GM/DL — SIGNIFICANT CHANGE UP (ref 32–36)
MCV RBC AUTO: 89.2 FL — SIGNIFICANT CHANGE UP (ref 80–100)
MCV RBC AUTO: 89.2 FL — SIGNIFICANT CHANGE UP (ref 80–100)
PLATELET # BLD AUTO: 167 K/UL — SIGNIFICANT CHANGE UP (ref 150–400)
PLATELET # BLD AUTO: 167 K/UL — SIGNIFICANT CHANGE UP (ref 150–400)
POTASSIUM SERPL-MCNC: 4.7 MMOL/L — SIGNIFICANT CHANGE UP (ref 3.5–5.3)
POTASSIUM SERPL-MCNC: 4.7 MMOL/L — SIGNIFICANT CHANGE UP (ref 3.5–5.3)
POTASSIUM SERPL-SCNC: 4.7 MMOL/L — SIGNIFICANT CHANGE UP (ref 3.5–5.3)
POTASSIUM SERPL-SCNC: 4.7 MMOL/L — SIGNIFICANT CHANGE UP (ref 3.5–5.3)
PROT SERPL-MCNC: 6 G/DL — LOW (ref 6.6–8.7)
PROT SERPL-MCNC: 6 G/DL — LOW (ref 6.6–8.7)
PROTHROM AB SERPL-ACNC: 12.4 SEC — SIGNIFICANT CHANGE UP (ref 9.5–13)
PROTHROM AB SERPL-ACNC: 12.4 SEC — SIGNIFICANT CHANGE UP (ref 9.5–13)
RBC # BLD: 3.24 M/UL — LOW (ref 4.2–5.8)
RBC # BLD: 3.24 M/UL — LOW (ref 4.2–5.8)
RBC # FLD: 12.7 % — SIGNIFICANT CHANGE UP (ref 10.3–14.5)
RBC # FLD: 12.7 % — SIGNIFICANT CHANGE UP (ref 10.3–14.5)
SODIUM SERPL-SCNC: 138 MMOL/L — SIGNIFICANT CHANGE UP (ref 135–145)
SODIUM SERPL-SCNC: 138 MMOL/L — SIGNIFICANT CHANGE UP (ref 135–145)
TROPONIN T, HIGH SENSITIVITY RESULT: 265 NG/L — HIGH (ref 0–51)
TROPONIN T, HIGH SENSITIVITY RESULT: 265 NG/L — HIGH (ref 0–51)
WBC # BLD: 19.17 K/UL — HIGH (ref 3.8–10.5)
WBC # BLD: 19.17 K/UL — HIGH (ref 3.8–10.5)
WBC # FLD AUTO: 19.17 K/UL — HIGH (ref 3.8–10.5)
WBC # FLD AUTO: 19.17 K/UL — HIGH (ref 3.8–10.5)

## 2023-11-15 PROCEDURE — 99291 CRITICAL CARE FIRST HOUR: CPT | Mod: 24

## 2023-11-15 PROCEDURE — 71045 X-RAY EXAM CHEST 1 VIEW: CPT | Mod: 26

## 2023-11-15 PROCEDURE — 99222 1ST HOSP IP/OBS MODERATE 55: CPT

## 2023-11-15 PROCEDURE — 93010 ELECTROCARDIOGRAM REPORT: CPT

## 2023-11-15 PROCEDURE — 99024 POSTOP FOLLOW-UP VISIT: CPT

## 2023-11-15 RX ORDER — ALBUMIN HUMAN 25 %
250 VIAL (ML) INTRAVENOUS ONCE
Refills: 0 | Status: DISCONTINUED | OUTPATIENT
Start: 2023-11-15 | End: 2023-11-16

## 2023-11-15 RX ORDER — DEXTROSE 50 % IN WATER 50 %
12.5 SYRINGE (ML) INTRAVENOUS ONCE
Refills: 0 | Status: DISCONTINUED | OUTPATIENT
Start: 2023-11-15 | End: 2023-11-16

## 2023-11-15 RX ORDER — GLUCAGON INJECTION, SOLUTION 0.5 MG/.1ML
1 INJECTION, SOLUTION SUBCUTANEOUS ONCE
Refills: 0 | Status: DISCONTINUED | OUTPATIENT
Start: 2023-11-15 | End: 2023-11-19

## 2023-11-15 RX ORDER — METOPROLOL TARTRATE 50 MG
25 TABLET ORAL
Refills: 0 | Status: DISCONTINUED | OUTPATIENT
Start: 2023-11-15 | End: 2023-11-19

## 2023-11-15 RX ORDER — DEXTROSE 50 % IN WATER 50 %
15 SYRINGE (ML) INTRAVENOUS ONCE
Refills: 0 | Status: DISCONTINUED | OUTPATIENT
Start: 2023-11-15 | End: 2023-11-16

## 2023-11-15 RX ORDER — ENOXAPARIN SODIUM 100 MG/ML
40 INJECTION SUBCUTANEOUS EVERY 24 HOURS
Refills: 0 | Status: DISCONTINUED | OUTPATIENT
Start: 2023-11-15 | End: 2023-11-19

## 2023-11-15 RX ORDER — SODIUM CHLORIDE 9 MG/ML
1000 INJECTION, SOLUTION INTRAVENOUS
Refills: 0 | Status: DISCONTINUED | OUTPATIENT
Start: 2023-11-15 | End: 2023-11-19

## 2023-11-15 RX ORDER — HYDROMORPHONE HYDROCHLORIDE 2 MG/ML
0.5 INJECTION INTRAMUSCULAR; INTRAVENOUS; SUBCUTANEOUS ONCE
Refills: 0 | Status: DISCONTINUED | OUTPATIENT
Start: 2023-11-15 | End: 2023-11-15

## 2023-11-15 RX ORDER — LANOLIN ALCOHOL/MO/W.PET/CERES
5 CREAM (GRAM) TOPICAL AT BEDTIME
Refills: 0 | Status: DISCONTINUED | OUTPATIENT
Start: 2023-11-15 | End: 2023-11-19

## 2023-11-15 RX ORDER — DEXTROSE 50 % IN WATER 50 %
25 SYRINGE (ML) INTRAVENOUS ONCE
Refills: 0 | Status: DISCONTINUED | OUTPATIENT
Start: 2023-11-15 | End: 2023-11-19

## 2023-11-15 RX ORDER — INSULIN LISPRO 100/ML
2 VIAL (ML) SUBCUTANEOUS
Refills: 0 | Status: DISCONTINUED | OUTPATIENT
Start: 2023-11-15 | End: 2023-11-15

## 2023-11-15 RX ORDER — INSULIN LISPRO 100/ML
VIAL (ML) SUBCUTANEOUS
Refills: 0 | Status: DISCONTINUED | OUTPATIENT
Start: 2023-11-15 | End: 2023-11-19

## 2023-11-15 RX ORDER — DEXTROSE 50 % IN WATER 50 %
25 SYRINGE (ML) INTRAVENOUS ONCE
Refills: 0 | Status: DISCONTINUED | OUTPATIENT
Start: 2023-11-15 | End: 2023-11-16

## 2023-11-15 RX ORDER — INSULIN LISPRO 100/ML
4 VIAL (ML) SUBCUTANEOUS
Refills: 0 | Status: DISCONTINUED | OUTPATIENT
Start: 2023-11-15 | End: 2023-11-16

## 2023-11-15 RX ADMIN — AMIODARONE HYDROCHLORIDE 400 MILLIGRAM(S): 400 TABLET ORAL at 17:16

## 2023-11-15 RX ADMIN — Medication 250 MILLIGRAM(S): at 10:43

## 2023-11-15 RX ADMIN — PANTOPRAZOLE SODIUM 40 MILLIGRAM(S): 20 TABLET, DELAYED RELEASE ORAL at 11:10

## 2023-11-15 RX ADMIN — HYDROMORPHONE HYDROCHLORIDE 0.5 MILLIGRAM(S): 2 INJECTION INTRAMUSCULAR; INTRAVENOUS; SUBCUTANEOUS at 00:41

## 2023-11-15 RX ADMIN — Medication 975 MILLIGRAM(S): at 22:27

## 2023-11-15 RX ADMIN — OXYCODONE HYDROCHLORIDE 5 MILLIGRAM(S): 5 TABLET ORAL at 09:37

## 2023-11-15 RX ADMIN — CHLORHEXIDINE GLUCONATE 1 APPLICATION(S): 213 SOLUTION TOPICAL at 14:51

## 2023-11-15 RX ADMIN — Medication 25 MILLIGRAM(S): at 19:32

## 2023-11-15 RX ADMIN — Medication 2 UNIT(S): at 07:56

## 2023-11-15 RX ADMIN — GABAPENTIN 100 MILLIGRAM(S): 400 CAPSULE ORAL at 20:31

## 2023-11-15 RX ADMIN — Medication 975 MILLIGRAM(S): at 06:32

## 2023-11-15 RX ADMIN — Medication 500 MILLIGRAM(S): at 17:16

## 2023-11-15 RX ADMIN — GABAPENTIN 100 MILLIGRAM(S): 400 CAPSULE ORAL at 06:32

## 2023-11-15 RX ADMIN — Medication 100 MILLIGRAM(S): at 22:27

## 2023-11-15 RX ADMIN — HYDROMORPHONE HYDROCHLORIDE 0.5 MILLIGRAM(S): 2 INJECTION INTRAMUSCULAR; INTRAVENOUS; SUBCUTANEOUS at 12:19

## 2023-11-15 RX ADMIN — ATORVASTATIN CALCIUM 80 MILLIGRAM(S): 80 TABLET, FILM COATED ORAL at 20:31

## 2023-11-15 RX ADMIN — Medication 100 MILLIGRAM(S): at 09:38

## 2023-11-15 RX ADMIN — GABAPENTIN 100 MILLIGRAM(S): 400 CAPSULE ORAL at 14:50

## 2023-11-15 RX ADMIN — Medication 975 MILLIGRAM(S): at 12:09

## 2023-11-15 RX ADMIN — AMIODARONE HYDROCHLORIDE 400 MILLIGRAM(S): 400 TABLET ORAL at 07:08

## 2023-11-15 RX ADMIN — HYDROMORPHONE HYDROCHLORIDE 0.5 MILLIGRAM(S): 2 INJECTION INTRAMUSCULAR; INTRAVENOUS; SUBCUTANEOUS at 06:32

## 2023-11-15 RX ADMIN — Medication 250 MILLIGRAM(S): at 21:09

## 2023-11-15 RX ADMIN — Medication 5 MILLIGRAM(S): at 22:27

## 2023-11-15 RX ADMIN — OXYCODONE HYDROCHLORIDE 5 MILLIGRAM(S): 5 TABLET ORAL at 08:37

## 2023-11-15 RX ADMIN — OXYCODONE HYDROCHLORIDE 10 MILLIGRAM(S): 5 TABLET ORAL at 19:07

## 2023-11-15 RX ADMIN — Medication 975 MILLIGRAM(S): at 22:35

## 2023-11-15 RX ADMIN — Medication 2 UNIT(S): at 14:53

## 2023-11-15 RX ADMIN — Medication 975 MILLIGRAM(S): at 17:16

## 2023-11-15 RX ADMIN — HYDROMORPHONE HYDROCHLORIDE 0.5 MILLIGRAM(S): 2 INJECTION INTRAMUSCULAR; INTRAVENOUS; SUBCUTANEOUS at 07:00

## 2023-11-15 RX ADMIN — SENNA PLUS 2 TABLET(S): 8.6 TABLET ORAL at 20:31

## 2023-11-15 RX ADMIN — Medication 975 MILLIGRAM(S): at 11:09

## 2023-11-15 RX ADMIN — HYDROMORPHONE HYDROCHLORIDE 0.5 MILLIGRAM(S): 2 INJECTION INTRAMUSCULAR; INTRAVENOUS; SUBCUTANEOUS at 11:19

## 2023-11-15 RX ADMIN — Medication 81 MILLIGRAM(S): at 11:11

## 2023-11-15 RX ADMIN — Medication 100 MILLIGRAM(S): at 16:03

## 2023-11-15 RX ADMIN — ENOXAPARIN SODIUM 40 MILLIGRAM(S): 100 INJECTION SUBCUTANEOUS at 14:49

## 2023-11-15 RX ADMIN — HYDROMORPHONE HYDROCHLORIDE 0.5 MILLIGRAM(S): 2 INJECTION INTRAMUSCULAR; INTRAVENOUS; SUBCUTANEOUS at 00:11

## 2023-11-15 RX ADMIN — Medication 975 MILLIGRAM(S): at 07:00

## 2023-11-15 RX ADMIN — Medication 500 MILLIGRAM(S): at 06:32

## 2023-11-15 RX ADMIN — Medication 2 UNIT(S): at 16:37

## 2023-11-15 RX ADMIN — POLYETHYLENE GLYCOL 3350 17 GRAM(S): 17 POWDER, FOR SOLUTION ORAL at 11:12

## 2023-11-15 NOTE — PROGRESS NOTE ADULT - SUBJECTIVE AND OBJECTIVE BOX
Subjective - patient seen and evaluated bedside. Sitting comfortably in bed. Denies CP, SOB, HA, dizziness, n/v/d    Review of Systems: negative x 10 systems except as noted above    Brief summary:  78yMale POD# 1 CABG/excision of pericardial cyst    Significant/Cybw08yl events: extubated. as above      PAST MEDICAL & SURGICAL HISTORY:  Liver mass      Hyperlipidemia      Hypertension      Arteriosclerotic heart disease (ASHD)      Prostate cancer      History of radiation therapy      COVID-19 vaccine series completed      [place] (hard of hearing)      H/o Lyme disease      Former smoker      H/O varicose veins      Lumbar herniated disc      H/O total knee replacement, left      H/O removal of cyst      H/O resection of liver            aMIOdarone    Tablet 400 milliGRAM(s) Oral two times a day  ascorbic acid 500 milliGRAM(s) Oral two times a day  atorvastatin 80 milliGRAM(s) Oral at bedtime  cefuroxime  IVPB 1500 milliGRAM(s) IV Intermittent every 8 hours  chlorhexidine 2% Cloths 1 Application(s) Topical daily  dextrose 50% Injectable 25 milliLiter(s) IV Push every 15 minutes  dextrose 50% Injectable 50 milliLiter(s) IV Push every 15 minutes  gabapentin 100 milliGRAM(s) Oral every 8 hours  HYDROmorphone  Injectable 0.5 milliGRAM(s) IV Push every 3 hours PRN  insulin regular Infusion 2 Unit(s)/Hr IV Continuous <Continuous>  lactated ringers Bolus 500 milliLiter(s) IV Bolus every 30 minutes PRN  norepinephrine Infusion 0.05 MICROgram(s)/kG/Min IV Continuous <Continuous>  ondansetron Injectable 4 milliGRAM(s) IV Push every 6 hours PRN  potassium chloride  10 mEq/50 mL IVPB 10 milliEquivalent(s) IV Intermittent every 1 hour  potassium chloride  10 mEq/50 mL IVPB 10 milliEquivalent(s) IV Intermittent every 1 hour  potassium chloride  10 mEq/50 mL IVPB 10 milliEquivalent(s) IV Intermittent every 1 hour  sodium chloride 0.9%. 1000 milliLiter(s) IV Continuous <Continuous>  sodium chloride 0.9%. 1000 milliLiter(s) IV Continuous <Continuous>  vancomycin  IVPB 1000 milliGRAM(s) IV Intermittent every 12 hours  MEDICATIONS  (PRN):  HYDROmorphone  Injectable 0.5 milliGRAM(s) IV Push every 3 hours PRN Severe Pain (7 - 10)  lactated ringers Bolus 500 milliLiter(s) IV Bolus every 30 minutes PRN hypovolemia  ondansetron Injectable 4 milliGRAM(s) IV Push every 6 hours PRN Nausea and/or Vomiting    Height (cm): 188 (11-14 @ 06:15)  Weight (kg): 113.398 (11-14 @ 06:15)  BMI (kg/m2): 32.1 (11-14 @ 06:15)  BSA (m2): 2.39 (11-14 @ 06:15)Mode: CPAP with PS, FiO2: 40, PEEP: 6, PS: 5, MAP: 9  Daily Height in cm: 187.96 (14 Nov 2023 06:15)    Daily       ABG - ( 14 Nov 2023 15:40 )  pH, Arterial: 7.330 pH, Blood: x     /  pCO2: 44    /  pO2: 115   / HCO3: 23    / Base Excess: -2.7  /  SaO2: 98.8                                    11.1   26.77 )-----------( 203      ( 14 Nov 2023 13:55 )             33.0   11-14    x   |  x   |  x   ----------------------------<  x   4.4   |  x   |  x     Ca    9.3      14 Nov 2023 13:55  Mg     2.9     11-14    TPro  6.0<L>  /  Alb  3.8  /  TBili  0.7  /  DBili  x   /  AST  23  /  ALT  15  /  AlkPhos  53  11-14    CARDIAC MARKERS ( 14 Nov 2023 13:55 )  x     / x     / 203 U/L / x     / 10.5 ng/mL    PT/INR - ( 14 Nov 2023 13:55 )   PT: 12.5 sec;   INR: 1.13 ratio         PTT - ( 14 Nov 2023 13:55 )  PTT:26.2 sec      Objective:  T(C): 36.5 (11-14-23 @ 22:00), Max: 36.7 (11-14-23 @ 06:15)  HR: 87 (11-14-23 @ 22:15) (72 - 101)  BP: 103/58 (11-14-23 @ 22:00) (103/58 - 121/58)  RR: 12 (11-14-23 @ 22:15) (12 - 29)  SpO2: 98% (11-14-23 @ 22:15) (96% - 100%)  Wt(kg): --CAPILLARY BLOOD GLUCOSE      POCT Blood Glucose.: 147 mg/dL (14 Nov 2023 23:41)  POCT Blood Glucose.: 167 mg/dL (14 Nov 2023 22:18)  POCT Blood Glucose.: 178 mg/dL (14 Nov 2023 20:55)  POCT Blood Glucose.: 192 mg/dL (14 Nov 2023 19:21)  POCT Blood Glucose.: 184 mg/dL (14 Nov 2023 18:00)  POCT Blood Glucose.: 192 mg/dL (14 Nov 2023 16:56)  POCT Blood Glucose.: 204 mg/dL (14 Nov 2023 16:07)  POCT Blood Glucose.: 180 mg/dL (14 Nov 2023 15:22)  POCT Blood Glucose.: 175 mg/dL (14 Nov 2023 14:21)  I&O's Summary    14 Nov 2023 07:01  -  15 Nov 2023 00:00  --------------------------------------------------------  IN: 2250.2 mL / OUT: 1075 mL / NET: 1175.2 mL        Physical Exam  General: NAD  Neuro: A+O x 3, non-focal, speech clear and intact  Psych: Appropriate affect  HEENT:  NCAT, No conjuctival edema or icterus, no thrush.  Pulm: CTA, equal bilaterally  CV: RRR,  +S1S2  Abd: soft, NT, ND, +BS  Ext: +DP Pulses b/l, no edema  Skin: Warm, dry, intact  Inc: MSI C/D/I/stable w/ dressing, LE vein harvest site C/D/I with Ace wrap  Chest tubes: mediastinal and pleural CT to suction, draining appropriately, no AL         Imaging:      < from: TTE Echo Complete w/ Contrast w/ Doppler (11.09.23 @ 12:03) >  Summary:   1. Technically difficult study.   2. Endocardial visualization was enhanced with intravenous echo contrast.   3. Left ventricular ejection fraction, by visual estimation, is 55 to   60%.   4. Normal global left ventricular systolic function.   5. Spectral Doppler shows impaired relaxation pattern of left   ventricular myocardial filling (Grade I diastolic dysfunction).   6. There is moderate concentric left ventricular hypertrophy.   7. Pericardial cyst (9.4cm x 3.1cm x 5.2cm), c/w pt's known history of   pericarial cyst.   8. Mild thickening and calcification of the anterior and posterior   mitral valve leaflets.   9. Mild tricuspid regurgitation.    MD Jamilah Electronically signed on 11/9/2023 at 3:53:39 PM    < end of copied text >  postop cxr pending radiolog

## 2023-11-15 NOTE — PROGRESS NOTE ADULT - SUBJECTIVE AND OBJECTIVE BOX
FELY ARANA  MRN-[id number]    HPI:  79 y/o male presents today to PST pending CABG x 3 with Dr. Tony Henao secondary to ASHD Native coronary artery w/o Angina Pectoris on 11/14/23.  Pt. states he felt pressure, he called his wife at work and the pressure resolved, he went to [facility], and he had testing done there, there were restrictions shown he states, he was told he needs a cardiac catheterization, he was admitted, and procedure revealed 2 blockages arteries, and a cyst in the back of the heart. Pt. states he has no further CP, or SOB. Pt. states when he walks a block he feels SOB, or with activity around the house and he has to stop and rest. Denies palpitations, lightheadedness or dizziness. History of HLD, HTN, prostate cancer -s/p radiation therapy.  (08 Nov 2023 07:43)      Surgery/Hospital Course:  ·  PRE-OP DIAGNOSIS:  CAD (coronary artery disease)   Pericardial cyst   ·  POST-OP DIAGNOSIS:  CAD (coronary artery disease)  Pericardial cyst   ·  PROCEDURES:  CABG, with CHARBEL 14-Nov-2023   Two vessel CABG (SVG-Ramus, LIMA-LAD)  Surgical removal of pericardial cyst   Today:  No acute events -  DC Levo    ICU Vital Signs Last 24 Hrs  T(C): 36.8 (15 Nov 2023 07:00), Max: 36.8 (15 Nov 2023 06:00)  T(F): 98.2 (15 Nov 2023 07:00), Max: 98.2 (15 Nov 2023 06:00)  HR: 85 (15 Nov 2023 10:00) (79 - 101)  BP: 119/59 (15 Nov 2023 10:00) (102/55 - 134/62)  BP(mean): 81 (15 Nov 2023 10:00) (73 - 89)  ABP: 123/44 (15 Nov 2023 10:00) (57/46 - 147/56)  ABP(mean): 67 (15 Nov 2023 10:00) (57 - 86)  RR: 19 (15 Nov 2023 10:00) (11 - 36)  SpO2: 94% (15 Nov 2023 10:00) (89% - 100%)    O2 Parameters below as of 15 Nov 2023 07:00  Patient On (Oxygen Delivery Method): room air            Physical Exam:  Gen: A&O   CNS: non focal 	  Neck: no JVD  RES : clear , no wheezing              CVS: Regular  rhythm. Normal S1/S2  Abd: Soft, non-distended. Bowel sounds present.  Skin: No rash.  Ext:  no edema    ============================I/O===========================   I&O's Detail    14 Nov 2023 07:01  -  15 Nov 2023 07:00  --------------------------------------------------------  IN:    Albumin 5%  - 250 mL: 500 mL    Insulin: 60 mL    IV PiggyBack: 250 mL    IV PiggyBack: 200 mL    IV PiggyBack: 100 mL    Lactated Ringers Bolus: 750 mL    Norepinephrine: 100.3 mL    Oral Fluid: 240 mL    sodium chloride 0.9%: 90 mL    sodium chloride 0.9%: 180 mL  Total IN: 2470.3 mL    OUT:    Chest Tube (mL): 290 mL    Chest Tube (mL): 100 mL    Dexmedetomidine: 0 mL    Indwelling Catheter - Urethral (mL): 1445 mL  Total OUT: 1835 mL    Total NET: 635.3 mL      15 Nov 2023 07:01  -  15 Nov 2023 12:17  --------------------------------------------------------  IN:    Insulin: 10.5 mL  Total IN: 10.5 mL    OUT:    Chest Tube (mL): 35 mL    Chest Tube (mL): 18 mL    Indwelling Catheter - Urethral (mL): 110 mL  Total OUT: 163 mL    Total NET: -152.5 mL        ============================ LABS =========================                        10.2   19.17 )-----------( 167      ( 15 Nov 2023 02:10 )             28.9     11-15    138  |  102  |  22.0<H>  ----------------------------<  144<H>  4.7   |  24.0  |  1.05    Ca    8.7      15 Nov 2023 02:10  Mg     2.3     11-15    TPro  6.0<L>  /  Alb  3.8  /  TBili  0.6  /  DBili  x   /  AST  24  /  ALT  15  /  AlkPhos  44  11-15    LIVER FUNCTIONS - ( 15 Nov 2023 02:10 )  Alb: 3.8 g/dL / Pro: 6.0 g/dL / ALK PHOS: 44 U/L / ALT: 15 U/L / AST: 24 U/L / GGT: x           PT/INR - ( 15 Nov 2023 02:10 )   PT: 12.4 sec;   INR: 1.12 ratio         PTT - ( 15 Nov 2023 02:10 )  PTT:25.4 sec  ABG - ( 14 Nov 2023 15:40 )  pH, Arterial: 7.330 pH, Blood: x     /  pCO2: 44    /  pO2: 115   / HCO3: 23    / Base Excess: -2.7  /  SaO2: 98.8              Urinalysis Basic - ( 15 Nov 2023 02:10 )    Color: x / Appearance: x / SG: x / pH: x  Gluc: 144 mg/dL / Ketone: x  / Bili: x / Urobili: x   Blood: x / Protein: x / Nitrite: x   Leuk Esterase: x / RBC: x / WBC x   Sq Epi: x / Non Sq Epi: x / Bacteria: x      ======================Micro/Rad/Cardio=================  Culture: Reviewed   CXR: Reviewed  Echo:Reviewed  ======================================================  PAST MEDICAL & SURGICAL HISTORY:  Liver mass      Hyperlipidemia      Hypertension      Arteriosclerotic heart disease (ASHD)      Prostate cancer      History of radiation therapy      COVID-19 vaccine series completed      [place] (hard of hearing)      H/o Lyme disease      Former smoker      H/O varicose veins      Lumbar herniated disc      H/O total knee replacement, left      H/O removal of cyst      H/O resection of liver        ====================ASSESSMENT ==============  79 y/o male presents today to PST pending CABG x 3 with Dr. Tony Henao secondary to ASHD Native coronary artery w/o Angina Pectoris on 11/14/23.  Pt. states he felt pressure, he called his wife at work and the pressure resolved, he went to [facility], and he had testing done there, there were restrictions shown he states, he was told he needs a cardiac catheterization, he was admitted, and procedure revealed 2 blockages arteries, and a cyst in the back of the heart. Pt. states he has no further CP, or SOB. Pt. states when he walks a block he feels SOB, or with activity around the house and he has to stop and rest. Denies palpitations, lightheadedness or dizziness. History of HLD, HTN, prostate cancer -s/p radiation therapy.  (08 Nov 2023 07:43)    ---Liver mass  ---Hyperlipidemia  ---Hypertension  ---Prostate cancer  ---H/O varicose veins  ---H/O resection of liver  ---CAD (coronary artery disease)  ---Pericardial cyst   ---S/p CABG, with CHARBEL 14-Nov-2023              Two vessel CABG (SVG-Ramus, LIMA-LAD)             Surgical removal of pericardial cyst   ---Post op Hypovolemic shock  ---Post op respiratory insufficiency       Plan:  -Beta blocker as tolerated by HR and SBP once off pressors  -Lipitor for chronic graft patency prophylaxis  -Aspirin for acute graft closure prophylaxis  -Encourage deep breathing exercised and coughing  - Lovenox and SCDs for DVT prophylaxis  -Protonix for GI prophylaxis  -DC Levo    ====================== NEUROLOGY=====================  acetaminophen     Tablet .. 975 milliGRAM(s) Oral every 6 hours  gabapentin 100 milliGRAM(s) Oral every 8 hours  ondansetron Injectable 4 milliGRAM(s) IV Push every 6 hours PRN Nausea and/or Vomiting  oxyCODONE    IR 10 milliGRAM(s) Oral every 4 hours PRN Severe Pain (7 - 10)  oxyCODONE    IR 5 milliGRAM(s) Oral every 4 hours PRN Moderate Pain (4 - 6)    ==================== RESPIRATORY======================  Post op respiratory insufficiency  ====================CARDIOVASCULAR==================  Post op Hypovolemia  aMIOdarone    Tablet 400 milliGRAM(s) Oral two times a day  norepinephrine Infusion 0.05 MICROgram(s)/kG/Min (10.6 mL/Hr) IV Continuous <Continuous>    ===================HEMATOLOGIC/ONC ===================  Monitor H&H/Plts    aspirin enteric coated 81 milliGRAM(s) Oral daily  enoxaparin Injectable 40 milliGRAM(s) SubCutaneous every 24 hours    ===================== RENAL =========================  Continue monitoring urine output, I&OS, BUN/Cr     ==================== GASTROINTESTINAL===================  albumin human  5% IVPB 250 milliLiter(s) IV Intermittent once  ascorbic acid 500 milliGRAM(s) Oral two times a day  lactated ringers Bolus 500 milliLiter(s) IV Bolus every 30 minutes PRN hypovolemia  pantoprazole    Tablet 40 milliGRAM(s) Oral daily  polyethylene glycol 3350 17 Gram(s) Oral daily  senna 2 Tablet(s) Oral at bedtime  sodium chloride 0.9%. 1000 milliLiter(s) (5 mL/Hr) IV Continuous <Continuous>  sodium chloride 0.9%. 1000 milliLiter(s) (10 mL/Hr) IV Continuous <Continuous>    =======================    ENDOCRINE  =====================  atorvastatin 80 milliGRAM(s) Oral at bedtime  dextrose 50% Injectable 50 milliLiter(s) IV Push every 15 minutes  dextrose 50% Injectable 25 milliLiter(s) IV Push every 15 minutes  insulin lispro Injectable (ADMELOG) 2 Unit(s) SubCutaneous three times a day before meals  insulin regular Infusion 2 Unit(s)/Hr (2 mL/Hr) IV Continuous <Continuous>    ========================INFECTIOUS DISEASE================  cefuroxime  IVPB 1500 milliGRAM(s) IV Intermittent every 8 hours  vancomycin  IVPB 1000 milliGRAM(s) IV Intermittent every 12 hours      -Monitor Neurologic status ,   -Head of the bed should remain elevated to 45 degrees,  -Monitor for arrhythmias and monitor parameters for organ perfusion,  -Glycemic control is satisfactory,  -Nutritional goals will be met using po eventually , insure adequate caloric intake and monitor the same ,  -Electrolytes have been repleted as necessary , pain control has been achieved  and wound care has been carried out ,  -Stress ulcer and VTE prophylaxis will be achieved,  -Agressive PT and early mobility and ambulation goals will be met,    I have spent 35 minutes providing acute care for this critically ill patient     Patient requires continuous monitoring with bedside rhythm monitoring, pulse ox monitoring, and intermittent blood gas analysis. Care plan discussed with ICU care team. Patient remained critical and at risk for life threatening decompensation.

## 2023-11-15 NOTE — CONSULT NOTE ADULT - SUBJECTIVE AND OBJECTIVE BOX
HPI:  78 y.o. Male with PMHx of Prediabetes, HLD, HTN, CAD underwent CABG x 3 on 11/14/23. Endocrinology evaluation was requested for Hyperglycemia management. Dx with Prediabetes ~10 y.ago. Has been diet managed by PCP. Tried to loose some wt to improve it. Never being on diabetic medications.     PAST MEDICAL & SURGICAL HISTORY:  Prediabetes  HLD  HTN  Prostate cancer  History of radiation therapy      Social History:  Denies smoking (former smoker), alcohol or illicit drug use     FAMILY HISTORY:  stroke (Father)  type 2 diabetes (Grandparent)      Allergies  penicillin (Other)      REVIEW OF SYSTEMS:    CONSTITUTIONAL: No fever, weight loss, or fatigue  EYES: No eye pain, visual disturbances, or discharge  ENMT:  No difficulty hearing, tinnitus, vertigo; No sinus or throat pain  NECK: No pain or stiffness  RESPIRATORY: No cough, wheezing, chills or hemoptysis; No shortness of breath  CARDIOVASCULAR: No chest pain, palpitations, dizziness, or leg swelling  GASTROINTESTINAL: No abdominal or epigastric pain. No nausea, vomiting, or hematemesis; No diarrhea or constipation. No melena or hematochezia.  NEUROLOGICAL: No headaches, memory loss, loss of strength, numbness, or tremors  SKIN: No itching, burning, rashes, or lesions   MUSCULOSKELETAL: No joint pain or swelling; No muscle, back, or extremity pain  PSYCHIATRIC: No depression, anxiety, mood swings, or difficulty sleeping      MEDICATIONS  (STANDING):  acetaminophen     Tablet .. 975 milliGRAM(s) Oral every 6 hours  albumin human  5% IVPB 250 milliLiter(s) IV Intermittent once  aMIOdarone    Tablet 400 milliGRAM(s) Oral two times a day  ascorbic acid 500 milliGRAM(s) Oral two times a day  aspirin enteric coated 81 milliGRAM(s) Oral daily  atorvastatin 80 milliGRAM(s) Oral at bedtime  cefuroxime  IVPB 1500 milliGRAM(s) IV Intermittent every 8 hours  chlorhexidine 2% Cloths 1 Application(s) Topical daily  dextrose 50% Injectable 25 milliLiter(s) IV Push every 15 minutes  dextrose 50% Injectable 50 milliLiter(s) IV Push every 15 minutes  enoxaparin Injectable 40 milliGRAM(s) SubCutaneous every 24 hours  gabapentin 100 milliGRAM(s) Oral every 8 hours  insulin lispro Injectable (ADMELOG) 2 Unit(s) SubCutaneous three times a day before meals  insulin regular Infusion 2 Unit(s)/Hr (2 mL/Hr) IV Continuous <Continuous>  norepinephrine Infusion 0.05 MICROgram(s)/kG/Min (10.6 mL/Hr) IV Continuous <Continuous>  pantoprazole    Tablet 40 milliGRAM(s) Oral daily  polyethylene glycol 3350 17 Gram(s) Oral daily  senna 2 Tablet(s) Oral at bedtime  sodium chloride 0.9%. 1000 milliLiter(s) (5 mL/Hr) IV Continuous <Continuous>  sodium chloride 0.9%. 1000 milliLiter(s) (10 mL/Hr) IV Continuous <Continuous>  vancomycin  IVPB 1000 milliGRAM(s) IV Intermittent every 12 hours    MEDICATIONS  (PRN):  lactated ringers Bolus 500 milliLiter(s) IV Bolus every 30 minutes PRN hypovolemia  ondansetron Injectable 4 milliGRAM(s) IV Push every 6 hours PRN Nausea and/or Vomiting  oxyCODONE    IR 10 milliGRAM(s) Oral every 4 hours PRN Severe Pain (7 - 10)  oxyCODONE    IR 5 milliGRAM(s) Oral every 4 hours PRN Moderate Pain (4 - 6)      Vital Signs Last 24 Hrs  T(C): 36.8 (15 Nov 2023 12:00), Max: 36.8 (15 Nov 2023 06:00)  T(F): 98.2 (15 Nov 2023 12:00), Max: 98.2 (15 Nov 2023 06:00)  HR: 93 (15 Nov 2023 12:00) (79 - 101)  BP: 131/60 (15 Nov 2023 12:00) (102/55 - 134/62)  BP(mean): 87 (15 Nov 2023 12:00) (73 - 89)  RR: 26 (15 Nov 2023 12:00) (11 - 36)  SpO2: 94% (15 Nov 2023 12:00) (89% - 100%)    Parameters below as of 15 Nov 2023 07:00  Patient On (Oxygen Delivery Method): room air      Height (cm): 188 (11-15-23 @ 00:00)  Weight (kg): 113.4 (11-15-23 @ 00:00)  BMI (kg/m2): 32.1 (11-15-23 @ 00:00)  BSA (m2): 2.39 (11-15-23 @ 00:00)    Physical Exam:    Constitutional: NAD, comfortable   HEENT: EOMI, no exophalmos  Neck: trachea midline, no thyroid enlargement  Respiratory: CTAB, normal respirations  Cardiovascular: S1 and S2, RRR  Gastrointestinal: BS+, soft, ntnd  Extremities: No peripheral edema  Neurological: AOx3, no focal deficits  Psychiatric: Normal mood and normal affect  Skin: warm and dry    LABS  11-15    138  |  102  |  22.0<H>  ----------------------------<  144<H>  4.7   |  24.0  |  1.05    Ca    8.7      15 Nov 2023 02:10  Mg     2.3     11-15    TPro  6.0<L>  /  Alb  3.8  /  TBili  0.6  /  DBili  x   /  AST  24  /  ALT  15  /  AlkPhos  44  11-15                          10.2   19.17 )-----------( 167      ( 15 Nov 2023 02:10 )             28.9       A1C with Estimated Average Glucose Result: 6.2 % (11-08-23 @ 08:08)      Albumin: 3.8 g/dL (11-15-23 @ 02:10)  Aspartate Aminotransferase (AST/SGOT): 24 U/L (11-15-23 @ 02:10)  Alanine Aminotransferase (ALT/SGPT): 15 U/L (11-15-23 @ 02:10)  Alkaline Phosphatase: 44 U/L (11-15-23 @ 02:10)  Alanine Aminotransferase (ALT/SGPT): 15 U/L (11-14-23 @ 13:55)  Alkaline Phosphatase: 53 U/L (11-14-23 @ 13:55)  Albumin: 3.8 g/dL (11-14-23 @ 13:55)  Aspartate Aminotransferase (AST/SGOT): 23 U/L (11-14-23 @ 13:55)    CAPILLARY BLOOD GLUCOSE    POCT Blood Glucose.: 135 mg/dL (15 Nov 2023 13:05)  POCT Blood Glucose.: 151 mg/dL (15 Nov 2023 11:05)  POCT Blood Glucose.: 130 mg/dL (15 Nov 2023 08:59)

## 2023-11-15 NOTE — CONSULT NOTE ADULT - ASSESSMENT
78 y.o. Male with PMHx of Prediabetes, HLD, HTN, CAD underwent CABG x 3 on 11/14/23. Endocrinology evaluation was requested for Hyperglycemia management. Dx with Prediabetes ~10 y.ago. Has been diet managed by PCP. Tried to loose some wt to improve it. Never being on diabetic medications.     Currently on insulin gtt 3.5U/h with Admelog 2U with meals    # Prediabetes in the setting of CAD  Continue current regimen as per ICU insulin gtt protocol  Might require just pre meal insulin with ISS after stopping drip  Monitor POC glucose as per protocol then qac and qhs with goal 120 - 180 mg/dL  Endocrinology will follow    # CAD  S/p CABG 11/14  Management as per CTS  Adequate pain control to improve hyperglycemia    # HLD  Continue statin  Low fat/cholesterol diet

## 2023-11-16 LAB
ANION GAP SERPL CALC-SCNC: 12 MMOL/L — SIGNIFICANT CHANGE UP (ref 5–17)
ANION GAP SERPL CALC-SCNC: 12 MMOL/L — SIGNIFICANT CHANGE UP (ref 5–17)
BUN SERPL-MCNC: 34.7 MG/DL — HIGH (ref 8–20)
BUN SERPL-MCNC: 34.7 MG/DL — HIGH (ref 8–20)
CALCIUM SERPL-MCNC: 8.5 MG/DL — SIGNIFICANT CHANGE UP (ref 8.4–10.5)
CALCIUM SERPL-MCNC: 8.5 MG/DL — SIGNIFICANT CHANGE UP (ref 8.4–10.5)
CHLORIDE SERPL-SCNC: 105 MMOL/L — SIGNIFICANT CHANGE UP (ref 96–108)
CHLORIDE SERPL-SCNC: 105 MMOL/L — SIGNIFICANT CHANGE UP (ref 96–108)
CO2 SERPL-SCNC: 23 MMOL/L — SIGNIFICANT CHANGE UP (ref 22–29)
CO2 SERPL-SCNC: 23 MMOL/L — SIGNIFICANT CHANGE UP (ref 22–29)
CREAT SERPL-MCNC: 1.27 MG/DL — SIGNIFICANT CHANGE UP (ref 0.5–1.3)
CREAT SERPL-MCNC: 1.27 MG/DL — SIGNIFICANT CHANGE UP (ref 0.5–1.3)
EGFR: 58 ML/MIN/1.73M2 — LOW
EGFR: 58 ML/MIN/1.73M2 — LOW
GLUCOSE BLDC GLUCOMTR-MCNC: 113 MG/DL — HIGH (ref 70–99)
GLUCOSE BLDC GLUCOMTR-MCNC: 113 MG/DL — HIGH (ref 70–99)
GLUCOSE BLDC GLUCOMTR-MCNC: 132 MG/DL — HIGH (ref 70–99)
GLUCOSE BLDC GLUCOMTR-MCNC: 132 MG/DL — HIGH (ref 70–99)
GLUCOSE BLDC GLUCOMTR-MCNC: 134 MG/DL — HIGH (ref 70–99)
GLUCOSE BLDC GLUCOMTR-MCNC: 134 MG/DL — HIGH (ref 70–99)
GLUCOSE BLDC GLUCOMTR-MCNC: 159 MG/DL — HIGH (ref 70–99)
GLUCOSE BLDC GLUCOMTR-MCNC: 159 MG/DL — HIGH (ref 70–99)
GLUCOSE SERPL-MCNC: 166 MG/DL — HIGH (ref 70–99)
GLUCOSE SERPL-MCNC: 166 MG/DL — HIGH (ref 70–99)
HCT VFR BLD CALC: 27.6 % — LOW (ref 39–50)
HCT VFR BLD CALC: 27.6 % — LOW (ref 39–50)
HGB BLD-MCNC: 9.2 G/DL — LOW (ref 13–17)
HGB BLD-MCNC: 9.2 G/DL — LOW (ref 13–17)
MAGNESIUM SERPL-MCNC: 2.3 MG/DL — SIGNIFICANT CHANGE UP (ref 1.6–2.6)
MAGNESIUM SERPL-MCNC: 2.3 MG/DL — SIGNIFICANT CHANGE UP (ref 1.6–2.6)
MCHC RBC-ENTMCNC: 30.3 PG — SIGNIFICANT CHANGE UP (ref 27–34)
MCHC RBC-ENTMCNC: 30.3 PG — SIGNIFICANT CHANGE UP (ref 27–34)
MCHC RBC-ENTMCNC: 33.3 GM/DL — SIGNIFICANT CHANGE UP (ref 32–36)
MCHC RBC-ENTMCNC: 33.3 GM/DL — SIGNIFICANT CHANGE UP (ref 32–36)
MCV RBC AUTO: 90.8 FL — SIGNIFICANT CHANGE UP (ref 80–100)
MCV RBC AUTO: 90.8 FL — SIGNIFICANT CHANGE UP (ref 80–100)
PLATELET # BLD AUTO: 152 K/UL — SIGNIFICANT CHANGE UP (ref 150–400)
PLATELET # BLD AUTO: 152 K/UL — SIGNIFICANT CHANGE UP (ref 150–400)
POTASSIUM SERPL-MCNC: 5.1 MMOL/L — SIGNIFICANT CHANGE UP (ref 3.5–5.3)
POTASSIUM SERPL-MCNC: 5.1 MMOL/L — SIGNIFICANT CHANGE UP (ref 3.5–5.3)
POTASSIUM SERPL-SCNC: 5.1 MMOL/L — SIGNIFICANT CHANGE UP (ref 3.5–5.3)
POTASSIUM SERPL-SCNC: 5.1 MMOL/L — SIGNIFICANT CHANGE UP (ref 3.5–5.3)
RBC # BLD: 3.04 M/UL — LOW (ref 4.2–5.8)
RBC # BLD: 3.04 M/UL — LOW (ref 4.2–5.8)
RBC # FLD: 13 % — SIGNIFICANT CHANGE UP (ref 10.3–14.5)
RBC # FLD: 13 % — SIGNIFICANT CHANGE UP (ref 10.3–14.5)
SODIUM SERPL-SCNC: 140 MMOL/L — SIGNIFICANT CHANGE UP (ref 135–145)
SODIUM SERPL-SCNC: 140 MMOL/L — SIGNIFICANT CHANGE UP (ref 135–145)
WBC # BLD: 18.73 K/UL — HIGH (ref 3.8–10.5)
WBC # BLD: 18.73 K/UL — HIGH (ref 3.8–10.5)
WBC # FLD AUTO: 18.73 K/UL — HIGH (ref 3.8–10.5)
WBC # FLD AUTO: 18.73 K/UL — HIGH (ref 3.8–10.5)

## 2023-11-16 PROCEDURE — 99024 POSTOP FOLLOW-UP VISIT: CPT

## 2023-11-16 PROCEDURE — 99233 SBSQ HOSP IP/OBS HIGH 50: CPT

## 2023-11-16 PROCEDURE — 93010 ELECTROCARDIOGRAM REPORT: CPT

## 2023-11-16 PROCEDURE — 71045 X-RAY EXAM CHEST 1 VIEW: CPT | Mod: 26

## 2023-11-16 RX ORDER — SODIUM CHLORIDE 9 MG/ML
3 INJECTION INTRAMUSCULAR; INTRAVENOUS; SUBCUTANEOUS EVERY 8 HOURS
Refills: 0 | Status: DISCONTINUED | OUTPATIENT
Start: 2023-11-16 | End: 2023-11-19

## 2023-11-16 RX ORDER — FUROSEMIDE 40 MG
40 TABLET ORAL DAILY
Refills: 0 | Status: DISCONTINUED | OUTPATIENT
Start: 2023-11-16 | End: 2023-11-19

## 2023-11-16 RX ADMIN — Medication 100 MILLIGRAM(S): at 08:00

## 2023-11-16 RX ADMIN — Medication 975 MILLIGRAM(S): at 12:02

## 2023-11-16 RX ADMIN — Medication 4 UNIT(S): at 07:59

## 2023-11-16 RX ADMIN — Medication 975 MILLIGRAM(S): at 06:30

## 2023-11-16 RX ADMIN — Medication 4 UNIT(S): at 12:02

## 2023-11-16 RX ADMIN — Medication 500 MILLIGRAM(S): at 17:24

## 2023-11-16 RX ADMIN — Medication 40 MILLIGRAM(S): at 06:01

## 2023-11-16 RX ADMIN — GABAPENTIN 100 MILLIGRAM(S): 400 CAPSULE ORAL at 05:57

## 2023-11-16 RX ADMIN — ATORVASTATIN CALCIUM 80 MILLIGRAM(S): 80 TABLET, FILM COATED ORAL at 22:19

## 2023-11-16 RX ADMIN — AMIODARONE HYDROCHLORIDE 400 MILLIGRAM(S): 400 TABLET ORAL at 17:23

## 2023-11-16 RX ADMIN — SODIUM CHLORIDE 3 MILLILITER(S): 9 INJECTION INTRAMUSCULAR; INTRAVENOUS; SUBCUTANEOUS at 22:19

## 2023-11-16 RX ADMIN — Medication 975 MILLIGRAM(S): at 05:57

## 2023-11-16 RX ADMIN — Medication 500 MILLIGRAM(S): at 05:58

## 2023-11-16 RX ADMIN — Medication 2: at 17:23

## 2023-11-16 RX ADMIN — SODIUM CHLORIDE 3 MILLILITER(S): 9 INJECTION INTRAMUSCULAR; INTRAVENOUS; SUBCUTANEOUS at 13:00

## 2023-11-16 RX ADMIN — Medication 250 MILLIGRAM(S): at 07:59

## 2023-11-16 RX ADMIN — GABAPENTIN 100 MILLIGRAM(S): 400 CAPSULE ORAL at 13:04

## 2023-11-16 RX ADMIN — POLYETHYLENE GLYCOL 3350 17 GRAM(S): 17 POWDER, FOR SOLUTION ORAL at 12:01

## 2023-11-16 RX ADMIN — Medication 975 MILLIGRAM(S): at 13:00

## 2023-11-16 RX ADMIN — Medication 25 MILLIGRAM(S): at 05:57

## 2023-11-16 RX ADMIN — Medication 25 MILLIGRAM(S): at 17:24

## 2023-11-16 RX ADMIN — Medication 5 MILLIGRAM(S): at 22:18

## 2023-11-16 RX ADMIN — AMIODARONE HYDROCHLORIDE 400 MILLIGRAM(S): 400 TABLET ORAL at 05:57

## 2023-11-16 RX ADMIN — GABAPENTIN 100 MILLIGRAM(S): 400 CAPSULE ORAL at 22:19

## 2023-11-16 RX ADMIN — SENNA PLUS 2 TABLET(S): 8.6 TABLET ORAL at 22:18

## 2023-11-16 RX ADMIN — ENOXAPARIN SODIUM 40 MILLIGRAM(S): 100 INJECTION SUBCUTANEOUS at 22:18

## 2023-11-16 RX ADMIN — Medication 975 MILLIGRAM(S): at 18:20

## 2023-11-16 RX ADMIN — Medication 81 MILLIGRAM(S): at 12:02

## 2023-11-16 RX ADMIN — Medication 975 MILLIGRAM(S): at 23:59

## 2023-11-16 RX ADMIN — Medication 975 MILLIGRAM(S): at 17:24

## 2023-11-16 RX ADMIN — PANTOPRAZOLE SODIUM 40 MILLIGRAM(S): 20 TABLET, DELAYED RELEASE ORAL at 12:02

## 2023-11-16 RX ADMIN — CHLORHEXIDINE GLUCONATE 1 APPLICATION(S): 213 SOLUTION TOPICAL at 12:05

## 2023-11-16 RX ADMIN — Medication 4 UNIT(S): at 17:22

## 2023-11-16 NOTE — PROGRESS NOTE ADULT - SUBJECTIVE AND OBJECTIVE BOX
78y Male seen and evaluated bedside. Endorsing no acute complaints. Denies any chest pain, palpitations, shortness of breath, wheezing, abd pain, nausea, vomiting, lightheadedness, headaches, fevers, or chills.     PAST MEDICAL & SURGICAL HISTORY:  Liver mass      Hyperlipidemia      Hypertension      Arteriosclerotic heart disease (ASHD)      Prostate cancer      History of radiation therapy      COVID-19 vaccine series completed      [place] (hard of hearing)      H/o Lyme disease      Former smoker      H/O varicose veins      Lumbar herniated disc      H/O total knee replacement, left      H/O removal of cyst      H/O resection of liver          Medications:  acetaminophen     Tablet .. 975 milliGRAM(s) Oral every 6 hours  albumin human  5% IVPB 250 milliLiter(s) IV Intermittent once  aMIOdarone    Tablet 400 milliGRAM(s) Oral two times a day  ascorbic acid 500 milliGRAM(s) Oral two times a day  aspirin enteric coated 81 milliGRAM(s) Oral daily  atorvastatin 80 milliGRAM(s) Oral at bedtime  bisacodyl Suppository 10 milliGRAM(s) Rectal once  cefuroxime  IVPB 1500 milliGRAM(s) IV Intermittent every 8 hours  chlorhexidine 2% Cloths 1 Application(s) Topical daily  dextrose 5%. 1000 milliLiter(s) IV Continuous <Continuous>  dextrose 5%. 1000 milliLiter(s) IV Continuous <Continuous>  dextrose 50% Injectable 25 Gram(s) IV Push once  dextrose 50% Injectable 12.5 Gram(s) IV Push once  dextrose 50% Injectable 25 milliLiter(s) IV Push every 15 minutes  dextrose 50% Injectable 25 Gram(s) IV Push once  dextrose 50% Injectable 50 milliLiter(s) IV Push every 15 minutes  dextrose Oral Gel 15 Gram(s) Oral once PRN  enoxaparin Injectable 40 milliGRAM(s) SubCutaneous every 24 hours  gabapentin 100 milliGRAM(s) Oral every 8 hours  glucagon  Injectable 1 milliGRAM(s) IntraMuscular once  insulin lispro (ADMELOG) corrective regimen sliding scale   SubCutaneous Before meals and at bedtime  insulin lispro Injectable (ADMELOG) 4 Unit(s) SubCutaneous three times a day before meals  lactated ringers Bolus 500 milliLiter(s) IV Bolus every 30 minutes PRN  melatonin 5 milliGRAM(s) Oral at bedtime  metoprolol tartrate 25 milliGRAM(s) Oral two times a day  ondansetron Injectable 4 milliGRAM(s) IV Push every 6 hours PRN  oxyCODONE    IR 10 milliGRAM(s) Oral every 4 hours PRN  oxyCODONE    IR 5 milliGRAM(s) Oral every 4 hours PRN  pantoprazole    Tablet 40 milliGRAM(s) Oral daily  polyethylene glycol 3350 17 Gram(s) Oral daily  senna 2 Tablet(s) Oral at bedtime  sodium chloride 0.9%. 1000 milliLiter(s) IV Continuous <Continuous>  sodium chloride 0.9%. 1000 milliLiter(s) IV Continuous <Continuous>  vancomycin  IVPB 1000 milliGRAM(s) IV Intermittent every 12 hours      MEDICATIONS  (PRN):  dextrose Oral Gel 15 Gram(s) Oral once PRN Blood Glucose LESS THAN 70 milliGRAM(s)/deciliter  lactated ringers Bolus 500 milliLiter(s) IV Bolus every 30 minutes PRN hypovolemia  ondansetron Injectable 4 milliGRAM(s) IV Push every 6 hours PRN Nausea and/or Vomiting  oxyCODONE    IR 5 milliGRAM(s) Oral every 4 hours PRN Moderate Pain (4 - 6)  oxyCODONE    IR 10 milliGRAM(s) Oral every 4 hours PRN Severe Pain (7 - 10)      Daily Review:        ABG - ( 14 Nov 2023 15:40 )  pH, Arterial: 7.330 pH, Blood: x     /  pCO2: 44    /  pO2: 115   / HCO3: 23    / Base Excess: -2.7  /  SaO2: 98.8                                    10.2   19.17 )-----------( 167      ( 15 Nov 2023 02:10 )             28.9   11-15    138  |  102  |  22.0<H>  ----------------------------<  144<H>  4.7   |  24.0  |  1.05    Ca    8.7      15 Nov 2023 02:10  Mg     2.3     11-15    TPro  6.0<L>  /  Alb  3.8  /  TBili  0.6  /  DBili  x   /  AST  24  /  ALT  15  /  AlkPhos  44  11-15    CARDIAC MARKERS ( 15 Nov 2023 02:10 )  x     / x     / 465 U/L / x     / 11.6 ng/mL  CARDIAC MARKERS ( 14 Nov 2023 13:55 )  x     / x     / 203 U/L / x     / 10.5 ng/mL    PT/INR - ( 15 Nov 2023 02:10 )   PT: 12.4 sec;   INR: 1.12 ratio         PTT - ( 15 Nov 2023 02:10 )  PTT:25.4 sec    T(C): 37.2 (11-15-23 @ 19:50), Max: 37.2 (11-15-23 @ 19:50)  HR: 79 (11-16-23 @ 00:00) (77 - 97)  BP: 129/61 (11-16-23 @ 00:00) (102/55 - 149/71)  RR: 15 (11-16-23 @ 00:00) (11 - 36)  SpO2: 92% (11-16-23 @ 00:00) (89% - 96%)  Wt(kg): --    CAPILLARY BLOOD GLUCOSE      POCT Blood Glucose.: 152 mg/dL (15 Nov 2023 23:53)  POCT Blood Glucose.: 142 mg/dL (15 Nov 2023 22:33)  POCT Blood Glucose.: 150 mg/dL (15 Nov 2023 21:03)  POCT Blood Glucose.: 168 mg/dL (15 Nov 2023 19:26)  POCT Blood Glucose.: 130 mg/dL (15 Nov 2023 16:30)  POCT Blood Glucose.: 118 mg/dL (15 Nov 2023 15:56)  POCT Blood Glucose.: 114 mg/dL (15 Nov 2023 15:01)  POCT Blood Glucose.: 135 mg/dL (15 Nov 2023 13:05)  POCT Blood Glucose.: 151 mg/dL (15 Nov 2023 11:05)  POCT Blood Glucose.: 130 mg/dL (15 Nov 2023 08:59)  POCT Blood Glucose.: 157 mg/dL (15 Nov 2023 07:16)  POCT Blood Glucose.: 133 mg/dL (15 Nov 2023 04:56)  POCT Blood Glucose.: 132 mg/dL (15 Nov 2023 02:14)      I&O's Summary    14 Nov 2023 07:01  -  15 Nov 2023 07:00  --------------------------------------------------------  IN: 2470.3 mL / OUT: 1835 mL / NET: 635.3 mL    15 Nov 2023 07:01  -  16 Nov 2023 00:08  --------------------------------------------------------  IN: 1504.5 mL / OUT: 1008 mL / NET: 496.5 mL        Physical Exam  General: Well appearing, laying in bed on an incline, NAD  Neurological: AOx3, no focal neurological deficits  Cardiovascular: Regular Rate/Rhythm, S1/2 auscultated, No extra heart sounds  Respiratory: CTA b/l over all lung fields, No adventitious breath sounds  Gastrointestinal: Bowel sounds present in all 4 quadrants, Abdomen is soft, non-tender, non-distended  Extremities: No peripheral edema noted, 5/5 strength and full range of motion in all 4 extremities b/l  Vascular: 2+ peripheral pulses b/l in upper and lower extremities, Radial, DP  Incision Sites: MSI, CT sites, Vein [place] site, c/d/i, no erythema, no purulence. No Sternal Click

## 2023-11-16 NOTE — PROGRESS NOTE ADULT - SUBJECTIVE AND OBJECTIVE BOX
INTERVAL EVENTS:  Follow up prediabetes management    ROS: Denies chest pain, sob, abd pain. Endorses good appetite.     MEDICATIONS  (STANDING):  acetaminophen     Tablet .. 975 milliGRAM(s) Oral every 6 hours  albumin human  5% IVPB 250 milliLiter(s) IV Intermittent once  aMIOdarone    Tablet 400 milliGRAM(s) Oral two times a day  ascorbic acid 500 milliGRAM(s) Oral two times a day  aspirin enteric coated 81 milliGRAM(s) Oral daily  atorvastatin 80 milliGRAM(s) Oral at bedtime  bisacodyl Suppository 10 milliGRAM(s) Rectal once  chlorhexidine 2% Cloths 1 Application(s) Topical daily  dextrose 5%. 1000 milliLiter(s) (50 mL/Hr) IV Continuous <Continuous>  dextrose 5%. 1000 milliLiter(s) (100 mL/Hr) IV Continuous <Continuous>  dextrose 50% Injectable 25 Gram(s) IV Push once  dextrose 50% Injectable 50 milliLiter(s) IV Push every 15 minutes  dextrose 50% Injectable 25 milliLiter(s) IV Push every 15 minutes  enoxaparin Injectable 40 milliGRAM(s) SubCutaneous every 24 hours  furosemide    Tablet 40 milliGRAM(s) Oral daily  gabapentin 100 milliGRAM(s) Oral every 8 hours  glucagon  Injectable 1 milliGRAM(s) IntraMuscular once  insulin lispro (ADMELOG) corrective regimen sliding scale   SubCutaneous Before meals and at bedtime  insulin lispro Injectable (ADMELOG) 4 Unit(s) SubCutaneous three times a day before meals  melatonin 5 milliGRAM(s) Oral at bedtime  metoprolol tartrate 25 milliGRAM(s) Oral two times a day  pantoprazole    Tablet 40 milliGRAM(s) Oral daily  polyethylene glycol 3350 17 Gram(s) Oral daily  senna 2 Tablet(s) Oral at bedtime  sodium chloride 0.9% lock flush 3 milliLiter(s) IV Push every 8 hours    MEDICATIONS  (PRN):  ondansetron Injectable 4 milliGRAM(s) IV Push every 6 hours PRN Nausea and/or Vomiting  oxyCODONE    IR 5 milliGRAM(s) Oral every 4 hours PRN Moderate Pain (4 - 6)  oxyCODONE    IR 10 milliGRAM(s) Oral every 4 hours PRN Severe Pain (7 - 10)    Allergies  penicillin (Other)    Vital Signs Last 24 Hrs  T(C): 37 (16 Nov 2023 08:55), Max: 37.2 (15 Nov 2023 19:50)  T(F): 98.6 (16 Nov 2023 08:55), Max: 98.9 (15 Nov 2023 19:50)  HR: 64 (16 Nov 2023 08:55) (64 - 95)  BP: 114/62 (16 Nov 2023 08:55) (112/57 - 149/71)  BP(mean): 83 (16 Nov 2023 08:00) (79 - 102)  RR: 20 (16 Nov 2023 08:55) (11 - 26)  SpO2: 97% (16 Nov 2023 08:55) (90% - 98%)    Parameters below as of 16 Nov 2023 08:55  Patient On (Oxygen Delivery Method): room air    PHYSICAL EXAM:  General: No apparent distress  Neck: Supple, trachea midline, no thyromegaly  Respiratory: Lungs clear bilaterally, normal rate, effort  Cardiac: +S1, S2, no m/r/g  GI: +BS, soft, non tender, non distended  Extremities: No peripheral edema, no pedal lesions  Neuro: A+O X3, no tremor    LABS:                        9.2    18.73 )-----------( 152      ( 16 Nov 2023 02:43 )             27.6     11-16    140  |  105  |  34.7<H>  ----------------------------<  166<H>  5.1   |  23.0  |  1.27    Ca    8.5      16 Nov 2023 02:43  Mg     2.3     11-16    TPro  6.0<L>  /  Alb  3.8  /  TBili  0.6  /  DBili  x   /  AST  24  /  ALT  15  /  AlkPhos  44  11-15    Urinalysis Basic - ( 16 Nov 2023 02:43 )    Color: x / Appearance: x / SG: x / pH: x  Gluc: 166 mg/dL / Ketone: x  / Bili: x / Urobili: x   Blood: x / Protein: x / Nitrite: x   Leuk Esterase: x / RBC: x / WBC x   Sq Epi: x / Non Sq Epi: x / Bacteria: x      POCT Blood Glucose.: 132 mg/dL (11-16-23 @ 11:58)  POCT Blood Glucose.: 134 mg/dL (11-16-23 @ 07:29)  POCT Blood Glucose.: 152 mg/dL (11-15-23 @ 23:53)  POCT Blood Glucose.: 142 mg/dL (11-15-23 @ 22:33)  POCT Blood Glucose.: 150 mg/dL (11-15-23 @ 21:03)  POCT Blood Glucose.: 168 mg/dL (11-15-23 @ 19:26)  POCT Blood Glucose.: 130 mg/dL (11-15-23 @ 16:30)        Triiodothyronine, Total (T3 Total): 123 ng/dL (11-08-23 @ 08:08)  Thyroid Stimulating Hormone, Serum: 1.96 uIU/mL (11-08-23 @ 08:08)

## 2023-11-16 NOTE — PROGRESS NOTE ADULT - SUBJECTIVE AND OBJECTIVE BOX
FELY ARANA  MRN-[id number]    HPI:  79 y/o male presents today to PST pending CABG x 3 with Dr. Tony Henao secondary to ASHD Native coronary artery w/o Angina Pectoris on 11/14/23.  Pt. states he felt pressure, he called his wife at work and the pressure resolved, he went to [facility], and he had testing done there, there were restrictions shown he states, he was told he needs a cardiac catheterization, he was admitted, and procedure revealed 2 blockages arteries, and a cyst in the back of the heart. Pt. states he has no further CP, or SOB. Pt. states when he walks a block he feels SOB, or with activity around the house and he has to stop and rest. Denies palpitations, lightheadedness or dizziness. History of HLD, HTN, prostate cancer -s/p radiation therapy.  (08 Nov 2023 07:43)    Surgery/Hospital Course:  ·  PRE-OP DIAGNOSIS:  CAD (coronary artery disease)   Pericardial cyst   ·  POST-OP DIAGNOSIS:  CAD (coronary artery disease)  Pericardial cyst   ·  PROCEDURES:  CABG, with CHARBEL 14-Nov-2023   Two vessel CABG (SVG-Ramus, LIMA-LAD)  Surgical removal of pericardial cyst   Today:  No acute events --    ICU Vital Signs Last 24 Hrs  T(C): 37.2 (15 Nov 2023 19:50), Max: 37.2 (15 Nov 2023 19:50)  T(F): 98.9 (15 Nov 2023 19:50), Max: 98.9 (15 Nov 2023 19:50)  HR: 66 (16 Nov 2023 08:00) (66 - 95)  BP: 118/58 (16 Nov 2023 08:00) (112/57 - 149/71)  BP(mean): 83 (16 Nov 2023 08:00) (79 - 102)  ABP: 121/45 (16 Nov 2023 05:00) (108/43 - 150/52)  ABP(mean): 78 (16 Nov 2023 05:00) (65 - 85)  RR: 20 (16 Nov 2023 08:00) (11 - 29)  SpO2: 98% (16 Nov 2023 08:00) (90% - 98%)    O2 Parameters below as of 15 Nov 2023 21:00  Patient On (Oxygen Delivery Method): room air            Physical Exam:  Gen: A&O   CNS: non focal 	  Neck: no JVD  RES : clear , no wheezing              CVS: Regular  rhythm. Normal S1/S2  Abd: Soft, non-distended. Bowel sounds present.  Skin: No rash.  Ext:  no edema    ============================I/O===========================   I&O's Detail    15 Nov 2023 07:01  -  16 Nov 2023 07:00  --------------------------------------------------------  IN:    Albumin 5%  - 250 mL: 250 mL    Insulin: 34.5 mL    IV PiggyBack: 50 mL    IV PiggyBack: 500 mL    Oral Fluid: 600 mL    sodium chloride 0.9%: 105 mL  Total IN: 1539.5 mL    OUT:    Chest Tube (mL): 93 mL    Chest Tube (mL): 180 mL    Indwelling Catheter - Urethral (mL): 1075 mL  Total OUT: 1348 mL    Total NET: 191.5 mL      16 Nov 2023 07:01  -  16 Nov 2023 08:47  --------------------------------------------------------  IN:    IV PiggyBack: 250 mL    IV PiggyBack: 50 mL    Oral Fluid: 200 mL  Total IN: 500 mL    OUT:    Chest Tube (mL): 20 mL    Indwelling Catheter - Urethral (mL): 150 mL  Total OUT: 170 mL    Total NET: 330 mL        ============================ LABS =========================                        9.2    18.73 )-----------( 152      ( 16 Nov 2023 02:43 )             27.6     11-16    140  |  105  |  34.7<H>  ----------------------------<  166<H>  5.1   |  23.0  |  1.27    Ca    8.5      16 Nov 2023 02:43  Mg     2.3     11-16    TPro  6.0<L>  /  Alb  3.8  /  TBili  0.6  /  DBili  x   /  AST  24  /  ALT  15  /  AlkPhos  44  11-15    LIVER FUNCTIONS - ( 15 Nov 2023 02:10 )  Alb: 3.8 g/dL / Pro: 6.0 g/dL / ALK PHOS: 44 U/L / ALT: 15 U/L / AST: 24 U/L / GGT: x           PT/INR - ( 15 Nov 2023 02:10 )   PT: 12.4 sec;   INR: 1.12 ratio         PTT - ( 15 Nov 2023 02:10 )  PTT:25.4 sec  ABG - ( 14 Nov 2023 15:40 )  pH, Arterial: 7.330 pH, Blood: x     /  pCO2: 44    /  pO2: 115   / HCO3: 23    / Base Excess: -2.7  /  SaO2: 98.8              Urinalysis Basic - ( 16 Nov 2023 02:43 )    Color: x / Appearance: x / SG: x / pH: x  Gluc: 166 mg/dL / Ketone: x  / Bili: x / Urobili: x   Blood: x / Protein: x / Nitrite: x   Leuk Esterase: x / RBC: x / WBC x   Sq Epi: x / Non Sq Epi: x / Bacteria: x      ======================Micro/Rad/Cardio=================  Culture: Reviewed   CXR: Reviewed  Echo:Reviewed  ======================================================  PAST MEDICAL & SURGICAL HISTORY:  Liver mass      Hyperlipidemia      Hypertension      Arteriosclerotic heart disease (ASHD)      Prostate cancer      History of radiation therapy      COVID-19 vaccine series completed      [place] (hard of hearing)      H/o Lyme disease      Former smoker      H/O varicose veins      Lumbar herniated disc      H/O total knee replacement, left      H/O removal of cyst      H/O resection of liver        ====================ASSESSMENT ==============  79 y/o male presents today to PST pending CABG x 3 with Dr. Tony Henao secondary to ASHD Native coronary artery w/o Angina Pectoris on 11/14/23.  Pt. states he felt pressure, he called his wife at work and the pressure resolved, he went to [facility], and he had testing done there, there were restrictions shown he states, he was told he needs a cardiac catheterization, he was admitted, and procedure revealed 2 blockages arteries, and a cyst in the back of the heart. Pt. states he has no further CP, or SOB. Pt. states when he walks a block he feels SOB, or with activity around the house and he has to stop and rest. Denies palpitations, lightheadedness or dizziness. History of HLD, HTN, prostate cancer -s/p radiation therapy.  (08 Nov 2023 07:43)    ---Liver mass  ---Hyperlipidemia  ---Hypertension  ---Prostate cancer  ---H/O varicose veins  ---H/O resection of liver  ---CAD (coronary artery disease)  ---Pericardial cyst   ---S/p CABG, with CHARBEL 14-Nov-2023              Two vessel CABG (SVG-Ramus, LIMA-LAD)             Surgical removal of pericardial cyst   ---Post op Hypovolemic shock  ---Post op respiratory insufficiency       Plan:  -Beta blocker as tolerated by HR and SBP once off pressors  -Lipitor for chronic graft patency prophylaxis  -Aspirin for acute graft closure prophylaxis  -Encourage deep breathing exercised and coughing  - Lovenox and SCDs for DVT prophylaxis  -Protonix for GI prophylaxis  -DC SS chest tube  -Lasix 40 mg po    ====================== NEUROLOGY=====================  acetaminophen     Tablet .. 975 milliGRAM(s) Oral every 6 hours  gabapentin 100 milliGRAM(s) Oral every 8 hours  melatonin 5 milliGRAM(s) Oral at bedtime  ondansetron Injectable 4 milliGRAM(s) IV Push every 6 hours PRN Nausea and/or Vomiting  oxyCODONE    IR 5 milliGRAM(s) Oral every 4 hours PRN Moderate Pain (4 - 6)  oxyCODONE    IR 10 milliGRAM(s) Oral every 4 hours PRN Severe Pain (7 - 10)    ==================== RESPIRATORY======================  Post op respiratory insufficiency    ====================CARDIOVASCULAR==================  Post op Hypovolemia  aMIOdarone    Tablet 400 milliGRAM(s) Oral two times a day  furosemide    Tablet 40 milliGRAM(s) Oral daily  metoprolol tartrate 25 milliGRAM(s) Oral two times a day    ===================HEMATOLOGIC/ONC ===================  Monitor H&H/Plts    aspirin enteric coated 81 milliGRAM(s) Oral daily  enoxaparin Injectable 40 milliGRAM(s) SubCutaneous every 24 hours    ===================== RENAL =========================  Continue monitoring urine output, I&OS, BUN/Cr     ==================== GASTROINTESTINAL===================  albumin human  5% IVPB 250 milliLiter(s) IV Intermittent once  ascorbic acid 500 milliGRAM(s) Oral two times a day  bisacodyl Suppository 10 milliGRAM(s) Rectal once  dextrose 5%. 1000 milliLiter(s) (50 mL/Hr) IV Continuous <Continuous>  dextrose 5%. 1000 milliLiter(s) (100 mL/Hr) IV Continuous <Continuous>  pantoprazole    Tablet 40 milliGRAM(s) Oral daily  polyethylene glycol 3350 17 Gram(s) Oral daily  senna 2 Tablet(s) Oral at bedtime  sodium chloride 0.9% lock flush 3 milliLiter(s) IV Push every 8 hours    =======================    ENDOCRINE  =====================  atorvastatin 80 milliGRAM(s) Oral at bedtime  dextrose 50% Injectable 25 Gram(s) IV Push once  dextrose 50% Injectable 50 milliLiter(s) IV Push every 15 minutes  dextrose 50% Injectable 25 milliLiter(s) IV Push every 15 minutes  glucagon  Injectable 1 milliGRAM(s) IntraMuscular once  insulin lispro (ADMELOG) corrective regimen sliding scale   SubCutaneous Before meals and at bedtime  insulin lispro Injectable (ADMELOG) 4 Unit(s) SubCutaneous three times a day before meals    ========================INFECTIOUS DISEASE================      -Monitor Neurologic status ,   -Head of the bed should remain elevated to 45 degrees,  -Monitor for arrhythmias and monitor parameters for organ perfusion,  -Glycemic control is satisfactory,  -Nutritional goals will be met using po eventually , insure adequate caloric intake and monitor the same ,  -Electrolytes have been repleted as necessary , pain control has been achieved  and wound care has been carried out ,  -Stress ulcer and VTE prophylaxis will be achieved,  -Agressive PT and early mobility and ambulation goals will be met,    I have spent 35 minutes providing acute care for this critically ill patient     Patient requires continuous monitoring with bedside rhythm monitoring, pulse ox monitoring, and intermittent blood gas analysis. Care plan discussed with ICU care team. Patient remained critical and at risk for life threatening decompensation.

## 2023-11-16 NOTE — PATIENT PROFILE ADULT - FALL HARM RISK - HARM RISK INTERVENTIONS

## 2023-11-17 LAB
ANION GAP SERPL CALC-SCNC: 9 MMOL/L — SIGNIFICANT CHANGE UP (ref 5–17)
ANION GAP SERPL CALC-SCNC: 9 MMOL/L — SIGNIFICANT CHANGE UP (ref 5–17)
BUN SERPL-MCNC: 41.9 MG/DL — HIGH (ref 8–20)
BUN SERPL-MCNC: 41.9 MG/DL — HIGH (ref 8–20)
CALCIUM SERPL-MCNC: 8.7 MG/DL — SIGNIFICANT CHANGE UP (ref 8.4–10.5)
CALCIUM SERPL-MCNC: 8.7 MG/DL — SIGNIFICANT CHANGE UP (ref 8.4–10.5)
CHLORIDE SERPL-SCNC: 103 MMOL/L — SIGNIFICANT CHANGE UP (ref 96–108)
CHLORIDE SERPL-SCNC: 103 MMOL/L — SIGNIFICANT CHANGE UP (ref 96–108)
CO2 SERPL-SCNC: 28 MMOL/L — SIGNIFICANT CHANGE UP (ref 22–29)
CO2 SERPL-SCNC: 28 MMOL/L — SIGNIFICANT CHANGE UP (ref 22–29)
CREAT SERPL-MCNC: 1.27 MG/DL — SIGNIFICANT CHANGE UP (ref 0.5–1.3)
CREAT SERPL-MCNC: 1.27 MG/DL — SIGNIFICANT CHANGE UP (ref 0.5–1.3)
EGFR: 58 ML/MIN/1.73M2 — LOW
EGFR: 58 ML/MIN/1.73M2 — LOW
GLUCOSE BLDC GLUCOMTR-MCNC: 105 MG/DL — HIGH (ref 70–99)
GLUCOSE BLDC GLUCOMTR-MCNC: 105 MG/DL — HIGH (ref 70–99)
GLUCOSE BLDC GLUCOMTR-MCNC: 112 MG/DL — HIGH (ref 70–99)
GLUCOSE BLDC GLUCOMTR-MCNC: 112 MG/DL — HIGH (ref 70–99)
GLUCOSE BLDC GLUCOMTR-MCNC: 122 MG/DL — HIGH (ref 70–99)
GLUCOSE BLDC GLUCOMTR-MCNC: 122 MG/DL — HIGH (ref 70–99)
GLUCOSE BLDC GLUCOMTR-MCNC: 133 MG/DL — HIGH (ref 70–99)
GLUCOSE BLDC GLUCOMTR-MCNC: 133 MG/DL — HIGH (ref 70–99)
GLUCOSE SERPL-MCNC: 107 MG/DL — HIGH (ref 70–99)
GLUCOSE SERPL-MCNC: 107 MG/DL — HIGH (ref 70–99)
HCT VFR BLD CALC: 30 % — LOW (ref 39–50)
HCT VFR BLD CALC: 30 % — LOW (ref 39–50)
HGB BLD-MCNC: 9.9 G/DL — LOW (ref 13–17)
HGB BLD-MCNC: 9.9 G/DL — LOW (ref 13–17)
MAGNESIUM SERPL-MCNC: 2.1 MG/DL — SIGNIFICANT CHANGE UP (ref 1.6–2.6)
MAGNESIUM SERPL-MCNC: 2.1 MG/DL — SIGNIFICANT CHANGE UP (ref 1.6–2.6)
MCHC RBC-ENTMCNC: 30.6 PG — SIGNIFICANT CHANGE UP (ref 27–34)
MCHC RBC-ENTMCNC: 30.6 PG — SIGNIFICANT CHANGE UP (ref 27–34)
MCHC RBC-ENTMCNC: 33 GM/DL — SIGNIFICANT CHANGE UP (ref 32–36)
MCHC RBC-ENTMCNC: 33 GM/DL — SIGNIFICANT CHANGE UP (ref 32–36)
MCV RBC AUTO: 92.6 FL — SIGNIFICANT CHANGE UP (ref 80–100)
MCV RBC AUTO: 92.6 FL — SIGNIFICANT CHANGE UP (ref 80–100)
PLATELET # BLD AUTO: 160 K/UL — SIGNIFICANT CHANGE UP (ref 150–400)
PLATELET # BLD AUTO: 160 K/UL — SIGNIFICANT CHANGE UP (ref 150–400)
POTASSIUM SERPL-MCNC: 4.9 MMOL/L — SIGNIFICANT CHANGE UP (ref 3.5–5.3)
POTASSIUM SERPL-MCNC: 4.9 MMOL/L — SIGNIFICANT CHANGE UP (ref 3.5–5.3)
POTASSIUM SERPL-SCNC: 4.9 MMOL/L — SIGNIFICANT CHANGE UP (ref 3.5–5.3)
POTASSIUM SERPL-SCNC: 4.9 MMOL/L — SIGNIFICANT CHANGE UP (ref 3.5–5.3)
RBC # BLD: 3.24 M/UL — LOW (ref 4.2–5.8)
RBC # BLD: 3.24 M/UL — LOW (ref 4.2–5.8)
RBC # FLD: 13.2 % — SIGNIFICANT CHANGE UP (ref 10.3–14.5)
RBC # FLD: 13.2 % — SIGNIFICANT CHANGE UP (ref 10.3–14.5)
SODIUM SERPL-SCNC: 140 MMOL/L — SIGNIFICANT CHANGE UP (ref 135–145)
SODIUM SERPL-SCNC: 140 MMOL/L — SIGNIFICANT CHANGE UP (ref 135–145)
WBC # BLD: 15.42 K/UL — HIGH (ref 3.8–10.5)
WBC # BLD: 15.42 K/UL — HIGH (ref 3.8–10.5)
WBC # FLD AUTO: 15.42 K/UL — HIGH (ref 3.8–10.5)
WBC # FLD AUTO: 15.42 K/UL — HIGH (ref 3.8–10.5)

## 2023-11-17 PROCEDURE — 99233 SBSQ HOSP IP/OBS HIGH 50: CPT

## 2023-11-17 PROCEDURE — 71045 X-RAY EXAM CHEST 1 VIEW: CPT | Mod: 26,77

## 2023-11-17 PROCEDURE — 32551 INSERTION OF CHEST TUBE: CPT | Mod: LT

## 2023-11-17 PROCEDURE — 93010 ELECTROCARDIOGRAM REPORT: CPT

## 2023-11-17 PROCEDURE — 71045 X-RAY EXAM CHEST 1 VIEW: CPT | Mod: 26,76

## 2023-11-17 PROCEDURE — 99024 POSTOP FOLLOW-UP VISIT: CPT

## 2023-11-17 RX ORDER — OXYCODONE HYDROCHLORIDE 5 MG/1
5 TABLET ORAL ONCE
Refills: 0 | Status: DISCONTINUED | OUTPATIENT
Start: 2023-11-17 | End: 2023-11-17

## 2023-11-17 RX ORDER — ACETAMINOPHEN 500 MG
1000 TABLET ORAL ONCE
Refills: 0 | Status: COMPLETED | OUTPATIENT
Start: 2023-11-17 | End: 2023-11-17

## 2023-11-17 RX ORDER — HYDROMORPHONE HYDROCHLORIDE 2 MG/ML
0.5 INJECTION INTRAMUSCULAR; INTRAVENOUS; SUBCUTANEOUS ONCE
Refills: 0 | Status: DISCONTINUED | OUTPATIENT
Start: 2023-11-17 | End: 2023-11-17

## 2023-11-17 RX ORDER — LIDOCAINE 4 G/100G
1 CREAM TOPICAL EVERY 24 HOURS
Refills: 0 | Status: DISCONTINUED | OUTPATIENT
Start: 2023-11-17 | End: 2023-11-19

## 2023-11-17 RX ADMIN — Medication 25 MILLIGRAM(S): at 05:36

## 2023-11-17 RX ADMIN — GABAPENTIN 100 MILLIGRAM(S): 400 CAPSULE ORAL at 05:36

## 2023-11-17 RX ADMIN — CHLORHEXIDINE GLUCONATE 1 APPLICATION(S): 213 SOLUTION TOPICAL at 12:54

## 2023-11-17 RX ADMIN — Medication 975 MILLIGRAM(S): at 13:47

## 2023-11-17 RX ADMIN — HYDROMORPHONE HYDROCHLORIDE 0.5 MILLIGRAM(S): 2 INJECTION INTRAMUSCULAR; INTRAVENOUS; SUBCUTANEOUS at 15:28

## 2023-11-17 RX ADMIN — Medication 975 MILLIGRAM(S): at 00:59

## 2023-11-17 RX ADMIN — POLYETHYLENE GLYCOL 3350 17 GRAM(S): 17 POWDER, FOR SOLUTION ORAL at 16:29

## 2023-11-17 RX ADMIN — Medication 975 MILLIGRAM(S): at 05:35

## 2023-11-17 RX ADMIN — Medication 81 MILLIGRAM(S): at 12:49

## 2023-11-17 RX ADMIN — AMIODARONE HYDROCHLORIDE 400 MILLIGRAM(S): 400 TABLET ORAL at 05:36

## 2023-11-17 RX ADMIN — PANTOPRAZOLE SODIUM 40 MILLIGRAM(S): 20 TABLET, DELAYED RELEASE ORAL at 12:49

## 2023-11-17 RX ADMIN — LIDOCAINE 1 PATCH: 4 CREAM TOPICAL at 19:01

## 2023-11-17 RX ADMIN — OXYCODONE HYDROCHLORIDE 10 MILLIGRAM(S): 5 TABLET ORAL at 21:30

## 2023-11-17 RX ADMIN — Medication 5 MILLIGRAM(S): at 21:30

## 2023-11-17 RX ADMIN — Medication 1000 MILLIGRAM(S): at 17:28

## 2023-11-17 RX ADMIN — Medication 500 MILLIGRAM(S): at 16:28

## 2023-11-17 RX ADMIN — SODIUM CHLORIDE 3 MILLILITER(S): 9 INJECTION INTRAMUSCULAR; INTRAVENOUS; SUBCUTANEOUS at 12:56

## 2023-11-17 RX ADMIN — Medication 25 MILLIGRAM(S): at 16:29

## 2023-11-17 RX ADMIN — SODIUM CHLORIDE 3 MILLILITER(S): 9 INJECTION INTRAMUSCULAR; INTRAVENOUS; SUBCUTANEOUS at 05:34

## 2023-11-17 RX ADMIN — Medication 975 MILLIGRAM(S): at 12:47

## 2023-11-17 RX ADMIN — OXYCODONE HYDROCHLORIDE 10 MILLIGRAM(S): 5 TABLET ORAL at 18:26

## 2023-11-17 RX ADMIN — OXYCODONE HYDROCHLORIDE 5 MILLIGRAM(S): 5 TABLET ORAL at 11:52

## 2023-11-17 RX ADMIN — Medication 40 MILLIGRAM(S): at 05:36

## 2023-11-17 RX ADMIN — Medication 400 MILLIGRAM(S): at 16:28

## 2023-11-17 RX ADMIN — GABAPENTIN 100 MILLIGRAM(S): 400 CAPSULE ORAL at 12:48

## 2023-11-17 RX ADMIN — OXYCODONE HYDROCHLORIDE 5 MILLIGRAM(S): 5 TABLET ORAL at 10:14

## 2023-11-17 RX ADMIN — SENNA PLUS 2 TABLET(S): 8.6 TABLET ORAL at 21:29

## 2023-11-17 RX ADMIN — ATORVASTATIN CALCIUM 80 MILLIGRAM(S): 80 TABLET, FILM COATED ORAL at 21:30

## 2023-11-17 RX ADMIN — GABAPENTIN 100 MILLIGRAM(S): 400 CAPSULE ORAL at 21:30

## 2023-11-17 RX ADMIN — LIDOCAINE 1 PATCH: 4 CREAM TOPICAL at 16:29

## 2023-11-17 RX ADMIN — OXYCODONE HYDROCHLORIDE 10 MILLIGRAM(S): 5 TABLET ORAL at 17:26

## 2023-11-17 RX ADMIN — OXYCODONE HYDROCHLORIDE 5 MILLIGRAM(S): 5 TABLET ORAL at 09:14

## 2023-11-17 RX ADMIN — OXYCODONE HYDROCHLORIDE 5 MILLIGRAM(S): 5 TABLET ORAL at 10:52

## 2023-11-17 RX ADMIN — OXYCODONE HYDROCHLORIDE 10 MILLIGRAM(S): 5 TABLET ORAL at 22:30

## 2023-11-17 RX ADMIN — HYDROMORPHONE HYDROCHLORIDE 0.5 MILLIGRAM(S): 2 INJECTION INTRAMUSCULAR; INTRAVENOUS; SUBCUTANEOUS at 16:28

## 2023-11-17 RX ADMIN — Medication 500 MILLIGRAM(S): at 05:36

## 2023-11-17 RX ADMIN — ENOXAPARIN SODIUM 40 MILLIGRAM(S): 100 INJECTION SUBCUTANEOUS at 21:35

## 2023-11-17 RX ADMIN — SODIUM CHLORIDE 3 MILLILITER(S): 9 INJECTION INTRAMUSCULAR; INTRAVENOUS; SUBCUTANEOUS at 21:26

## 2023-11-17 NOTE — PROGRESS NOTE ADULT - NS ATTEND AMEND GEN_ALL_CORE FT
Patient seen and examined at bedside with NP present. Agree with above assessment and recommendations.
Case was discussed with NP in details. Agree with the above assessment and recommendations.

## 2023-11-17 NOTE — PROGRESS NOTE ADULT - SUBJECTIVE AND OBJECTIVE BOX
POD #3 s/p Two vessel CABG (SVG-Ramus, LIMA-LAD), with surgical removal of pericardial cyst    PAST MEDICAL & SURGICAL HISTORY:  Liver mass  Hyperlipidemia  Hypertension  Arteriosclerotic heart disease (ASHD)  Prostate cancer  History of radiation therapy  COVID-19 vaccine series completed  [place] (hard of hearing)  H/o Lyme disease  Former smoker  H/O varicose veins  Lumbar herniated disc  H/O total knee replacement, left  H/O removal of cyst  H/O resection of liver    FAMILY HISTORY:  FH: stroke (Father)  FH: type 2 diabetes (Grandparent)    Brief Hospital Course: 78 year old male with a medical history of HLD, HTN, prostate cancer s/p radiation therapy, noted with exertional angina now s/p cath revealing Multivessel CAD and pericardial cyst. Patient underwent elective CABG X 2 with pericardial cyst removal on 11/14/23 with Dr. Henao.  Postoperative course remains uneventful at this time.     Significant recent/past 24 hr events: No overnight events reported.    Subjective: Patient lying in bed in NAD. +Tolerating diet. +Passing gas. +Pain currently controlled. Denies fevers, chills, lightheadedness, dizziness, HA, CP, palpitations, SOB, cough, abdominal pain, N/V, diarrhea, numbness/tingling in extremities, or any other acute complaints. ROS negative x 10 systems except as noted above.    MEDICATIONS  (STANDING):  acetaminophen     Tablet .. 975 milliGRAM(s) Oral every 6 hours  aMIOdarone    Tablet 400 milliGRAM(s) Oral two times a day  ascorbic acid 500 milliGRAM(s) Oral two times a day  aspirin enteric coated 81 milliGRAM(s) Oral daily  atorvastatin 80 milliGRAM(s) Oral at bedtime  bisacodyl Suppository 10 milliGRAM(s) Rectal once  chlorhexidine 2% Cloths 1 Application(s) Topical daily  enoxaparin Injectable 40 milliGRAM(s) SubCutaneous every 24 hours  furosemide    Tablet 40 milliGRAM(s) Oral daily  gabapentin 100 milliGRAM(s) Oral every 8 hours  insulin lispro (ADMELOG) corrective regimen sliding scale   SubCutaneous Before meals and at bedtime  melatonin 5 milliGRAM(s) Oral at bedtime  metoprolol tartrate 25 milliGRAM(s) Oral two times a day  pantoprazole    Tablet 40 milliGRAM(s) Oral daily  polyethylene glycol 3350 17 Gram(s) Oral daily  senna 2 Tablet(s) Oral at bedtime  sodium chloride 0.9% lock flush 3 milliLiter(s) IV Push every 8 hours    MEDICATIONS  (PRN):  ondansetron Injectable 4 milliGRAM(s) IV Push every 6 hours PRN Nausea and/or Vomiting  oxyCODONE    IR 5 milliGRAM(s) Oral every 4 hours PRN Moderate Pain (4 - 6)  oxyCODONE    IR 10 milliGRAM(s) Oral every 4 hours PRN Severe Pain (7 - 10)    Allergies: penicillin (Other)    Vitals   T(C): 36.4 (17 Nov 2023 00:07), Max: 37 (16 Nov 2023 08:55)  T(F): 97.6 (17 Nov 2023 00:07), Max: 98.6 (16 Nov 2023 08:55)  HR: 63 (17 Nov 2023 00:07) (63 - 75)  BP: 112/68 (17 Nov 2023 00:07) (97/59 - 133/63)  BP(mean): 83 (16 Nov 2023 08:00) (79 - 93)  ABP: 121/45 (16 Nov 2023 05:00) (121/45 - 137/56)  ABP(mean): 78 (16 Nov 2023 05:00) (78 - 83)  RR: 18 (17 Nov 2023 00:07) (17 - 23)  SpO2: 95% (17 Nov 2023 00:07) (95% - 98%)  O2 Parameters below as of 17 Nov 2023 00:07  Patient On (Oxygen Delivery Method): room air    I&O's Detail    15 Nov 2023 07:01  -  16 Nov 2023 07:00  --------------------------------------------------------  IN:    Albumin 5%  - 250 mL: 250 mL    Insulin: 34.5 mL    IV PiggyBack: 50 mL    IV PiggyBack: 500 mL    Oral Fluid: 600 mL    sodium chloride 0.9%: 105 mL  Total IN: 1539.5 mL    OUT:    Chest Tube (mL): 93 mL    Chest Tube (mL): 180 mL    Indwelling Catheter - Urethral (mL): 1075 mL  Total OUT: 1348 mL    Total NET: 191.5 mL      16 Nov 2023 07:01  -  17 Nov 2023 03:19  --------------------------------------------------------  IN:    IV PiggyBack: 250 mL    IV PiggyBack: 50 mL    Oral Fluid: 200 mL  Total IN: 500 mL    OUT:    Chest Tube (mL): 40 mL    Indwelling Catheter - Urethral (mL): 150 mL    Voided (mL): 1400 mL  Total OUT: 1590 mL    Total NET: -1090 mL    Physical Exam  General: Lying in bed in NAD  Neuro: A+O x 3, non-focal, speech clear and intact  HEENT:  NCAT, No conjuctival edema or icterus, no thrush.    Neck:  Supple, trachea midline  Pulm: +Diminished BSs at bases bilaterally with scattered rales. No accessory muscle use noted  Chest: Left pleural CT with dressing intact and no air leak, no subQ emphysema  CV: regular rate, regular rhythm, +S1S2, no murmur noted  Abd: soft, NT, ND, + BS  Ext: GRULLON x 4, trace edema, no cyanosis, distal motor/neuro/circ intact  Skin: warm, dry, perfused  Incisions: midsternal mepilex C/D/I, sternum stable, Left LE vein harvest site C/D/I with mepilex    LABS                        9.2    18.73 )-----------( 152      ( 16 Nov 2023 02:43 )             27.6     11-16    140  |  105  |  34.7<H>  ----------------------------<  166<H>  5.1   |  23.0  |  1.27    Ca    8.5      16 Nov 2023 02:43  Mg     2.3     11-16    Urinalysis Basic - ( 16 Nov 2023 02:43 )  Color: x / Appearance: x / SG: x / pH: x  Gluc: 166 mg/dL / Ketone: x  / Bili: x / Urobili: x   Blood: x / Protein: x / Nitrite: x   Leuk Esterase: x / RBC: x / WBC x   Sq Epi: x / Non Sq Epi: x / Bacteria: x    POCT Blood Glucose.: 113 mg/dL (11-16-23 @ 21:14)  POCT Blood Glucose.: 159 mg/dL (11-16-23 @ 17:11)  POCT Blood Glucose.: 132 mg/dL (11-16-23 @ 11:58)  POCT Blood Glucose.: 134 mg/dL (11-16-23 @ 07:29)    Last CXR:  < from: Xray Chest 1 View- PORTABLE-Routine (11.14.23 @ 13:26) >  IMPRESSION:  Status post cardiac surgery.  No evidence of active chest pathology.  Catheters and/or tubes in place.  < end of copied text >

## 2023-11-17 NOTE — PROGRESS NOTE ADULT - SUBJECTIVE AND OBJECTIVE BOX
INTERVAL EVENTS:  Follow up diabetes management    ROS: Denies chest pain, sob, abd pain    MEDICATIONS  (STANDING):  acetaminophen     Tablet .. 975 milliGRAM(s) Oral every 6 hours  ascorbic acid 500 milliGRAM(s) Oral two times a day  aspirin enteric coated 81 milliGRAM(s) Oral daily  atorvastatin 80 milliGRAM(s) Oral at bedtime  bisacodyl Suppository 10 milliGRAM(s) Rectal once  chlorhexidine 2% Cloths 1 Application(s) Topical daily  dextrose 5%. 1000 milliLiter(s) (50 mL/Hr) IV Continuous <Continuous>  dextrose 5%. 1000 milliLiter(s) (100 mL/Hr) IV Continuous <Continuous>  dextrose 50% Injectable 25 Gram(s) IV Push once  dextrose 50% Injectable 50 milliLiter(s) IV Push every 15 minutes  dextrose 50% Injectable 25 milliLiter(s) IV Push every 15 minutes  enoxaparin Injectable 40 milliGRAM(s) SubCutaneous every 24 hours  furosemide    Tablet 40 milliGRAM(s) Oral daily  gabapentin 100 milliGRAM(s) Oral every 8 hours  glucagon  Injectable 1 milliGRAM(s) IntraMuscular once  insulin lispro (ADMELOG) corrective regimen sliding scale   SubCutaneous Before meals and at bedtime  melatonin 5 milliGRAM(s) Oral at bedtime  metoprolol tartrate 25 milliGRAM(s) Oral two times a day  pantoprazole    Tablet 40 milliGRAM(s) Oral daily  polyethylene glycol 3350 17 Gram(s) Oral daily  senna 2 Tablet(s) Oral at bedtime  sodium chloride 0.9% lock flush 3 milliLiter(s) IV Push every 8 hours    MEDICATIONS  (PRN):  ondansetron Injectable 4 milliGRAM(s) IV Push every 6 hours PRN Nausea and/or Vomiting  oxyCODONE    IR 5 milliGRAM(s) Oral every 4 hours PRN Moderate Pain (4 - 6)  oxyCODONE    IR 10 milliGRAM(s) Oral every 4 hours PRN Severe Pain (7 - 10)    Allergies  penicillin (Other)    Vital Signs Last 24 Hrs  T(C): 36.3 (17 Nov 2023 08:04), Max: 36.6 (16 Nov 2023 17:15)  T(F): 97.4 (17 Nov 2023 08:04), Max: 97.9 (16 Nov 2023 17:15)  HR: 64 (17 Nov 2023 08:04) (63 - 75)  BP: 119/66 (17 Nov 2023 08:04) (97/59 - 122/68)  BP(mean): --  RR: 17 (17 Nov 2023 08:04) (17 - 19)  SpO2: 95% (17 Nov 2023 08:04) (93% - 97%)    Parameters below as of 17 Nov 2023 08:04  Patient On (Oxygen Delivery Method): room air    PHYSICAL EXAM:  General: No apparent distress  Neck: Supple, trachea midline, no thyromegaly  Respiratory: Lungs clear bilaterally, normal rate, effort  Cardiac: +S1, S2, no m/r/g  GI: +BS, soft, non tender, non distended  Extremities: No peripheral edema, no pedal lesions  Neuro: A+O X3, no tremor    LABS:                        9.9    15.42 )-----------( 160      ( 17 Nov 2023 05:09 )             30.0     11-17    140  |  103  |  41.9<H>  ----------------------------<  107<H>  4.9   |  28.0  |  1.27    Ca    8.7      17 Nov 2023 05:09  Mg     2.1     11-17      Urinalysis Basic - ( 17 Nov 2023 05:09 )    Color: x / Appearance: x / SG: x / pH: x  Gluc: 107 mg/dL / Ketone: x  / Bili: x / Urobili: x   Blood: x / Protein: x / Nitrite: x   Leuk Esterase: x / RBC: x / WBC x   Sq Epi: x / Non Sq Epi: x / Bacteria: x    POCT Blood Glucose.: 105 mg/dL (11-17-23 @ 08:03)  POCT Blood Glucose.: 113 mg/dL (11-16-23 @ 21:14)  POCT Blood Glucose.: 159 mg/dL (11-16-23 @ 17:11)  POCT Blood Glucose.: 132 mg/dL (11-16-23 @ 11:58)    Triiodothyronine, Total (T3 Total): 123 ng/dL (11-08-23 @ 08:08)  Thyroid Stimulating Hormone, Serum: 1.96 uIU/mL (11-08-23 @ 08:08)

## 2023-11-18 ENCOUNTER — TRANSCRIPTION ENCOUNTER (OUTPATIENT)
Age: 78
End: 2023-11-18

## 2023-11-18 DIAGNOSIS — J93.9 PNEUMOTHORAX, UNSPECIFIED: ICD-10-CM

## 2023-11-18 LAB
ANION GAP SERPL CALC-SCNC: 10 MMOL/L — SIGNIFICANT CHANGE UP (ref 5–17)
ANION GAP SERPL CALC-SCNC: 10 MMOL/L — SIGNIFICANT CHANGE UP (ref 5–17)
BUN SERPL-MCNC: 40.5 MG/DL — HIGH (ref 8–20)
BUN SERPL-MCNC: 40.5 MG/DL — HIGH (ref 8–20)
CALCIUM SERPL-MCNC: 9.2 MG/DL — SIGNIFICANT CHANGE UP (ref 8.4–10.5)
CALCIUM SERPL-MCNC: 9.2 MG/DL — SIGNIFICANT CHANGE UP (ref 8.4–10.5)
CHLORIDE SERPL-SCNC: 99 MMOL/L — SIGNIFICANT CHANGE UP (ref 96–108)
CHLORIDE SERPL-SCNC: 99 MMOL/L — SIGNIFICANT CHANGE UP (ref 96–108)
CO2 SERPL-SCNC: 28 MMOL/L — SIGNIFICANT CHANGE UP (ref 22–29)
CO2 SERPL-SCNC: 28 MMOL/L — SIGNIFICANT CHANGE UP (ref 22–29)
CREAT SERPL-MCNC: 1.3 MG/DL — SIGNIFICANT CHANGE UP (ref 0.5–1.3)
CREAT SERPL-MCNC: 1.3 MG/DL — SIGNIFICANT CHANGE UP (ref 0.5–1.3)
EGFR: 57 ML/MIN/1.73M2 — LOW
EGFR: 57 ML/MIN/1.73M2 — LOW
GLUCOSE BLDC GLUCOMTR-MCNC: 122 MG/DL — HIGH (ref 70–99)
GLUCOSE BLDC GLUCOMTR-MCNC: 122 MG/DL — HIGH (ref 70–99)
GLUCOSE BLDC GLUCOMTR-MCNC: 125 MG/DL — HIGH (ref 70–99)
GLUCOSE BLDC GLUCOMTR-MCNC: 125 MG/DL — HIGH (ref 70–99)
GLUCOSE BLDC GLUCOMTR-MCNC: 132 MG/DL — HIGH (ref 70–99)
GLUCOSE BLDC GLUCOMTR-MCNC: 132 MG/DL — HIGH (ref 70–99)
GLUCOSE BLDC GLUCOMTR-MCNC: 134 MG/DL — HIGH (ref 70–99)
GLUCOSE BLDC GLUCOMTR-MCNC: 134 MG/DL — HIGH (ref 70–99)
GLUCOSE SERPL-MCNC: 127 MG/DL — HIGH (ref 70–99)
GLUCOSE SERPL-MCNC: 127 MG/DL — HIGH (ref 70–99)
HCT VFR BLD CALC: 33.6 % — LOW (ref 39–50)
HCT VFR BLD CALC: 33.6 % — LOW (ref 39–50)
HGB BLD-MCNC: 10.8 G/DL — LOW (ref 13–17)
HGB BLD-MCNC: 10.8 G/DL — LOW (ref 13–17)
MAGNESIUM SERPL-MCNC: 2.1 MG/DL — SIGNIFICANT CHANGE UP (ref 1.6–2.6)
MAGNESIUM SERPL-MCNC: 2.1 MG/DL — SIGNIFICANT CHANGE UP (ref 1.6–2.6)
MCHC RBC-ENTMCNC: 29.8 PG — SIGNIFICANT CHANGE UP (ref 27–34)
MCHC RBC-ENTMCNC: 29.8 PG — SIGNIFICANT CHANGE UP (ref 27–34)
MCHC RBC-ENTMCNC: 32.1 GM/DL — SIGNIFICANT CHANGE UP (ref 32–36)
MCHC RBC-ENTMCNC: 32.1 GM/DL — SIGNIFICANT CHANGE UP (ref 32–36)
MCV RBC AUTO: 92.8 FL — SIGNIFICANT CHANGE UP (ref 80–100)
MCV RBC AUTO: 92.8 FL — SIGNIFICANT CHANGE UP (ref 80–100)
PLATELET # BLD AUTO: 200 K/UL — SIGNIFICANT CHANGE UP (ref 150–400)
PLATELET # BLD AUTO: 200 K/UL — SIGNIFICANT CHANGE UP (ref 150–400)
POTASSIUM SERPL-MCNC: 4.9 MMOL/L — SIGNIFICANT CHANGE UP (ref 3.5–5.3)
POTASSIUM SERPL-MCNC: 4.9 MMOL/L — SIGNIFICANT CHANGE UP (ref 3.5–5.3)
POTASSIUM SERPL-SCNC: 4.9 MMOL/L — SIGNIFICANT CHANGE UP (ref 3.5–5.3)
POTASSIUM SERPL-SCNC: 4.9 MMOL/L — SIGNIFICANT CHANGE UP (ref 3.5–5.3)
RBC # BLD: 3.62 M/UL — LOW (ref 4.2–5.8)
RBC # BLD: 3.62 M/UL — LOW (ref 4.2–5.8)
RBC # FLD: 13 % — SIGNIFICANT CHANGE UP (ref 10.3–14.5)
RBC # FLD: 13 % — SIGNIFICANT CHANGE UP (ref 10.3–14.5)
SODIUM SERPL-SCNC: 137 MMOL/L — SIGNIFICANT CHANGE UP (ref 135–145)
SODIUM SERPL-SCNC: 137 MMOL/L — SIGNIFICANT CHANGE UP (ref 135–145)
WBC # BLD: 13.95 K/UL — HIGH (ref 3.8–10.5)
WBC # BLD: 13.95 K/UL — HIGH (ref 3.8–10.5)
WBC # FLD AUTO: 13.95 K/UL — HIGH (ref 3.8–10.5)
WBC # FLD AUTO: 13.95 K/UL — HIGH (ref 3.8–10.5)

## 2023-11-18 PROCEDURE — 71045 X-RAY EXAM CHEST 1 VIEW: CPT | Mod: 26

## 2023-11-18 PROCEDURE — 99024 POSTOP FOLLOW-UP VISIT: CPT

## 2023-11-18 RX ORDER — SORBITOL SOLUTION 70 %
20 SOLUTION, ORAL MISCELLANEOUS ONCE
Refills: 0 | Status: COMPLETED | OUTPATIENT
Start: 2023-11-18 | End: 2023-11-18

## 2023-11-18 RX ORDER — MAGNESIUM OXIDE 400 MG ORAL TABLET 241.3 MG
400 TABLET ORAL
Refills: 0 | Status: COMPLETED | OUTPATIENT
Start: 2023-11-18 | End: 2023-11-18

## 2023-11-18 RX ADMIN — SODIUM CHLORIDE 3 MILLILITER(S): 9 INJECTION INTRAMUSCULAR; INTRAVENOUS; SUBCUTANEOUS at 21:39

## 2023-11-18 RX ADMIN — CHLORHEXIDINE GLUCONATE 1 APPLICATION(S): 213 SOLUTION TOPICAL at 09:37

## 2023-11-18 RX ADMIN — GABAPENTIN 100 MILLIGRAM(S): 400 CAPSULE ORAL at 14:53

## 2023-11-18 RX ADMIN — OXYCODONE HYDROCHLORIDE 10 MILLIGRAM(S): 5 TABLET ORAL at 15:04

## 2023-11-18 RX ADMIN — MAGNESIUM OXIDE 400 MG ORAL TABLET 400 MILLIGRAM(S): 241.3 TABLET ORAL at 17:30

## 2023-11-18 RX ADMIN — Medication 40 MILLIGRAM(S): at 05:37

## 2023-11-18 RX ADMIN — OXYCODONE HYDROCHLORIDE 10 MILLIGRAM(S): 5 TABLET ORAL at 09:48

## 2023-11-18 RX ADMIN — ATORVASTATIN CALCIUM 80 MILLIGRAM(S): 80 TABLET, FILM COATED ORAL at 21:33

## 2023-11-18 RX ADMIN — OXYCODONE HYDROCHLORIDE 10 MILLIGRAM(S): 5 TABLET ORAL at 06:37

## 2023-11-18 RX ADMIN — Medication 5 MILLIGRAM(S): at 21:34

## 2023-11-18 RX ADMIN — PANTOPRAZOLE SODIUM 40 MILLIGRAM(S): 20 TABLET, DELAYED RELEASE ORAL at 09:42

## 2023-11-18 RX ADMIN — OXYCODONE HYDROCHLORIDE 10 MILLIGRAM(S): 5 TABLET ORAL at 05:37

## 2023-11-18 RX ADMIN — SENNA PLUS 2 TABLET(S): 8.6 TABLET ORAL at 21:33

## 2023-11-18 RX ADMIN — Medication 25 MILLIGRAM(S): at 17:26

## 2023-11-18 RX ADMIN — GABAPENTIN 100 MILLIGRAM(S): 400 CAPSULE ORAL at 21:34

## 2023-11-18 RX ADMIN — Medication 81 MILLIGRAM(S): at 09:37

## 2023-11-18 RX ADMIN — Medication 25 MILLIGRAM(S): at 05:38

## 2023-11-18 RX ADMIN — POLYETHYLENE GLYCOL 3350 17 GRAM(S): 17 POWDER, FOR SOLUTION ORAL at 09:36

## 2023-11-18 RX ADMIN — SODIUM CHLORIDE 3 MILLILITER(S): 9 INJECTION INTRAMUSCULAR; INTRAVENOUS; SUBCUTANEOUS at 14:36

## 2023-11-18 RX ADMIN — Medication 500 MILLIGRAM(S): at 17:26

## 2023-11-18 RX ADMIN — GABAPENTIN 100 MILLIGRAM(S): 400 CAPSULE ORAL at 05:37

## 2023-11-18 RX ADMIN — OXYCODONE HYDROCHLORIDE 10 MILLIGRAM(S): 5 TABLET ORAL at 10:50

## 2023-11-18 RX ADMIN — ENOXAPARIN SODIUM 40 MILLIGRAM(S): 100 INJECTION SUBCUTANEOUS at 21:34

## 2023-11-18 RX ADMIN — SODIUM CHLORIDE 3 MILLILITER(S): 9 INJECTION INTRAMUSCULAR; INTRAVENOUS; SUBCUTANEOUS at 05:33

## 2023-11-18 RX ADMIN — MAGNESIUM OXIDE 400 MG ORAL TABLET 400 MILLIGRAM(S): 241.3 TABLET ORAL at 09:37

## 2023-11-18 RX ADMIN — LIDOCAINE 1 PATCH: 4 CREAM TOPICAL at 05:42

## 2023-11-18 RX ADMIN — OXYCODONE HYDROCHLORIDE 10 MILLIGRAM(S): 5 TABLET ORAL at 14:05

## 2023-11-18 RX ADMIN — Medication 500 MILLIGRAM(S): at 05:38

## 2023-11-18 RX ADMIN — Medication 20 MILLILITER(S): at 17:36

## 2023-11-18 RX ADMIN — MAGNESIUM OXIDE 400 MG ORAL TABLET 400 MILLIGRAM(S): 241.3 TABLET ORAL at 14:53

## 2023-11-18 NOTE — PROGRESS NOTE ADULT - SUBJECTIVE AND OBJECTIVE BOX
Brief summary:  78yMale seen and examined at the bedside. Patient resting in bed, in no acute distress. Patient reports pain at the pigtail catheter incision site. He denies chest pain, shortness of breath, palpitations, headache, dizziness, nausea, or vomiting.     Significant overnight events/24hours : Left chest tube dislodged 10/17 with patient experiencing SOB. Stat CXR showed mod to large lateral left pneumothorax, left pigtail catheter placed to suction with evacuation of air. F/U CXR revealed lung re-expansion.    PAST MEDICAL & SURGICAL HISTORY:  Liver mass    Hyperlipidemia    Hypertension    Arteriosclerotic heart disease (ASHD)    Prostate cancer    History of radiation therapy    COVID-19 vaccine series completed    [place]    H/o Lyme disease    Former smoker    H/O varicose veins    Lumbar herniated disc    H/O total knee replacement, left    H/O removal of cyst    H/O resection of liver    Medications:  acetaminophen     Tablet .. 650 milliGRAM(s) Oral every 6 hours PRN  ascorbic acid 500 milliGRAM(s) Oral two times a day  aspirin enteric coated 81 milliGRAM(s) Oral daily  atorvastatin 80 milliGRAM(s) Oral at bedtime  bisacodyl Suppository 10 milliGRAM(s) Rectal once  chlorhexidine 2% Cloths 1 Application(s) Topical daily  dextrose 5%. 1000 milliLiter(s) IV Continuous <Continuous>  dextrose 5%. 1000 milliLiter(s) IV Continuous <Continuous>  dextrose 50% Injectable 25 Gram(s) IV Push once  dextrose 50% Injectable 50 milliLiter(s) IV Push every 15 minutes  dextrose 50% Injectable 25 milliLiter(s) IV Push every 15 minutes  enoxaparin Injectable 40 milliGRAM(s) SubCutaneous every 24 hours  furosemide    Tablet 40 milliGRAM(s) Oral daily  gabapentin 100 milliGRAM(s) Oral every 8 hours  glucagon  Injectable 1 milliGRAM(s) IntraMuscular once  insulin lispro (ADMELOG) corrective regimen sliding scale   SubCutaneous Before meals and at bedtime  lidocaine   4% Patch 1 Patch Transdermal every 24 hours  melatonin 5 milliGRAM(s) Oral at bedtime  metoprolol tartrate 25 milliGRAM(s) Oral two times a day  ondansetron Injectable 4 milliGRAM(s) IV Push every 6 hours PRN  oxyCODONE    IR 5 milliGRAM(s) Oral every 4 hours PRN  oxyCODONE    IR 10 milliGRAM(s) Oral every 4 hours PRN  pantoprazole    Tablet 40 milliGRAM(s) Oral daily  polyethylene glycol 3350 17 Gram(s) Oral daily  senna 2 Tablet(s) Oral at bedtime  sodium chloride 0.9% lock flush 3 milliLiter(s) IV Push every 8 hours      MEDICATIONS  (PRN):  acetaminophen     Tablet .. 650 milliGRAM(s) Oral every 6 hours PRN Mild Pain (1 - 3)  ondansetron Injectable 4 milliGRAM(s) IV Push every 6 hours PRN Nausea and/or Vomiting  oxyCODONE    IR 5 milliGRAM(s) Oral every 4 hours PRN Moderate Pain (4 - 6)  oxyCODONE    IR 10 milliGRAM(s) Oral every 4 hours PRN Severe Pain (7 - 10)      Daily Review:                        9.9    15.42 )-----------( 160      ( 17 Nov 2023 05:09 )             30.0   11-17    140  |  103  |  41.9<H>  ----------------------------<  107<H>  4.9   |  28.0  |  1.27    Ca    8.7      17 Nov 2023 05:09  Mg     2.1     11-17      T(C): 36.6 (11-18-23 @ 00:26), Max: 36.9 (11-17-23 @ 16:34)  HR: 76 (11-18-23 @ 00:26) (64 - 87)  BP: 115/72 (11-18-23 @ 00:26) (109/68 - 123/73)  RR: 18 (11-18-23 @ 00:26) (17 - 18)  SpO2: 94% (11-18-23 @ 00:26) (93% - 100%)  Wt(kg): --    CAPILLARY BLOOD GLUCOSE    POCT Blood Glucose.: 112 mg/dL (17 Nov 2023 21:10)  POCT Blood Glucose.: 122 mg/dL (17 Nov 2023 17:02)  POCT Blood Glucose.: 133 mg/dL (17 Nov 2023 12:45)  POCT Blood Glucose.: 105 mg/dL (17 Nov 2023 08:03)      I&O's Summary    16 Nov 2023 07:01  -  17 Nov 2023 07:00  --------------------------------------------------------  IN: 840 mL / OUT: 2630 mL / NET: -1790 mL    17 Nov 2023 07:01  -  18 Nov 2023 01:22  --------------------------------------------------------  IN: 900 mL / OUT: 2410 mL / NET: -1510 mL      Physical Exam  General: alert, awake, no acute distress  Neuro: A+O x 3, non-focal, speech clear and intact  Neck: supple, trachea midline, no JVD noted  Pulm: diminished breath sounds bilaterally, no accessory muscle use noted   CV: RRR, +S1S2  Abd: soft, NT, ND, + bowel sounds  Ext: +DP Pulses b/l, +PT pulses, trace edema  Skin: warm, dry, well perfused  Inc: Mediastinal sternal incision C/D/I/stable w/ dressing, Left LE vein harvest site C/D/I with dressing  Chest tubes: left pigtail catheter to waterseal, tidaling noted

## 2023-11-19 ENCOUNTER — NON-APPOINTMENT (OUTPATIENT)
Age: 78
End: 2023-11-19

## 2023-11-19 ENCOUNTER — TRANSCRIPTION ENCOUNTER (OUTPATIENT)
Age: 78
End: 2023-11-19

## 2023-11-19 VITALS
RESPIRATION RATE: 18 BRPM | TEMPERATURE: 98 F | OXYGEN SATURATION: 93 % | SYSTOLIC BLOOD PRESSURE: 105 MMHG | DIASTOLIC BLOOD PRESSURE: 63 MMHG | HEART RATE: 81 BPM

## 2023-11-19 LAB
ALBUMIN SERPL ELPH-MCNC: 3.3 G/DL — SIGNIFICANT CHANGE UP (ref 3.3–5.2)
ALBUMIN SERPL ELPH-MCNC: 3.3 G/DL — SIGNIFICANT CHANGE UP (ref 3.3–5.2)
ALP SERPL-CCNC: 51 U/L — SIGNIFICANT CHANGE UP (ref 40–120)
ALP SERPL-CCNC: 51 U/L — SIGNIFICANT CHANGE UP (ref 40–120)
ALT FLD-CCNC: 16 U/L — SIGNIFICANT CHANGE UP
ALT FLD-CCNC: 16 U/L — SIGNIFICANT CHANGE UP
ANION GAP SERPL CALC-SCNC: 11 MMOL/L — SIGNIFICANT CHANGE UP (ref 5–17)
ANION GAP SERPL CALC-SCNC: 11 MMOL/L — SIGNIFICANT CHANGE UP (ref 5–17)
ANION GAP SERPL CALC-SCNC: 12 MMOL/L — SIGNIFICANT CHANGE UP (ref 5–17)
ANION GAP SERPL CALC-SCNC: 12 MMOL/L — SIGNIFICANT CHANGE UP (ref 5–17)
AST SERPL-CCNC: 15 U/L — SIGNIFICANT CHANGE UP
AST SERPL-CCNC: 15 U/L — SIGNIFICANT CHANGE UP
BILIRUB SERPL-MCNC: 0.7 MG/DL — SIGNIFICANT CHANGE UP (ref 0.4–2)
BILIRUB SERPL-MCNC: 0.7 MG/DL — SIGNIFICANT CHANGE UP (ref 0.4–2)
BUN SERPL-MCNC: 34.1 MG/DL — HIGH (ref 8–20)
BUN SERPL-MCNC: 34.1 MG/DL — HIGH (ref 8–20)
BUN SERPL-MCNC: 34.5 MG/DL — HIGH (ref 8–20)
BUN SERPL-MCNC: 34.5 MG/DL — HIGH (ref 8–20)
CALCIUM SERPL-MCNC: 9 MG/DL — SIGNIFICANT CHANGE UP (ref 8.4–10.5)
CALCIUM SERPL-MCNC: 9 MG/DL — SIGNIFICANT CHANGE UP (ref 8.4–10.5)
CALCIUM SERPL-MCNC: 9.3 MG/DL — SIGNIFICANT CHANGE UP (ref 8.4–10.5)
CALCIUM SERPL-MCNC: 9.3 MG/DL — SIGNIFICANT CHANGE UP (ref 8.4–10.5)
CHLORIDE SERPL-SCNC: 95 MMOL/L — LOW (ref 96–108)
CHLORIDE SERPL-SCNC: 95 MMOL/L — LOW (ref 96–108)
CHLORIDE SERPL-SCNC: 99 MMOL/L — SIGNIFICANT CHANGE UP (ref 96–108)
CHLORIDE SERPL-SCNC: 99 MMOL/L — SIGNIFICANT CHANGE UP (ref 96–108)
CO2 SERPL-SCNC: 27 MMOL/L — SIGNIFICANT CHANGE UP (ref 22–29)
CO2 SERPL-SCNC: 27 MMOL/L — SIGNIFICANT CHANGE UP (ref 22–29)
CO2 SERPL-SCNC: 29 MMOL/L — SIGNIFICANT CHANGE UP (ref 22–29)
CO2 SERPL-SCNC: 29 MMOL/L — SIGNIFICANT CHANGE UP (ref 22–29)
CREAT SERPL-MCNC: 1.18 MG/DL — SIGNIFICANT CHANGE UP (ref 0.5–1.3)
CREAT SERPL-MCNC: 1.18 MG/DL — SIGNIFICANT CHANGE UP (ref 0.5–1.3)
CREAT SERPL-MCNC: 1.28 MG/DL — SIGNIFICANT CHANGE UP (ref 0.5–1.3)
CREAT SERPL-MCNC: 1.28 MG/DL — SIGNIFICANT CHANGE UP (ref 0.5–1.3)
EGFR: 58 ML/MIN/1.73M2 — LOW
EGFR: 58 ML/MIN/1.73M2 — LOW
EGFR: 64 ML/MIN/1.73M2 — SIGNIFICANT CHANGE UP
EGFR: 64 ML/MIN/1.73M2 — SIGNIFICANT CHANGE UP
GLUCOSE BLDC GLUCOMTR-MCNC: 125 MG/DL — HIGH (ref 70–99)
GLUCOSE BLDC GLUCOMTR-MCNC: 125 MG/DL — HIGH (ref 70–99)
GLUCOSE BLDC GLUCOMTR-MCNC: 130 MG/DL — HIGH (ref 70–99)
GLUCOSE BLDC GLUCOMTR-MCNC: 130 MG/DL — HIGH (ref 70–99)
GLUCOSE SERPL-MCNC: 116 MG/DL — HIGH (ref 70–99)
GLUCOSE SERPL-MCNC: 116 MG/DL — HIGH (ref 70–99)
GLUCOSE SERPL-MCNC: 133 MG/DL — HIGH (ref 70–99)
GLUCOSE SERPL-MCNC: 133 MG/DL — HIGH (ref 70–99)
HCT VFR BLD CALC: 35.2 % — LOW (ref 39–50)
HCT VFR BLD CALC: 35.2 % — LOW (ref 39–50)
HGB BLD-MCNC: 11.5 G/DL — LOW (ref 13–17)
HGB BLD-MCNC: 11.5 G/DL — LOW (ref 13–17)
MAGNESIUM SERPL-MCNC: 2.2 MG/DL — SIGNIFICANT CHANGE UP (ref 1.8–2.6)
MAGNESIUM SERPL-MCNC: 2.2 MG/DL — SIGNIFICANT CHANGE UP (ref 1.8–2.6)
MCHC RBC-ENTMCNC: 30.2 PG — SIGNIFICANT CHANGE UP (ref 27–34)
MCHC RBC-ENTMCNC: 30.2 PG — SIGNIFICANT CHANGE UP (ref 27–34)
MCHC RBC-ENTMCNC: 32.7 GM/DL — SIGNIFICANT CHANGE UP (ref 32–36)
MCHC RBC-ENTMCNC: 32.7 GM/DL — SIGNIFICANT CHANGE UP (ref 32–36)
MCV RBC AUTO: 92.4 FL — SIGNIFICANT CHANGE UP (ref 80–100)
MCV RBC AUTO: 92.4 FL — SIGNIFICANT CHANGE UP (ref 80–100)
PHOSPHATE SERPL-MCNC: 4.3 MG/DL — SIGNIFICANT CHANGE UP (ref 2.4–4.7)
PHOSPHATE SERPL-MCNC: 4.3 MG/DL — SIGNIFICANT CHANGE UP (ref 2.4–4.7)
PLATELET # BLD AUTO: 201 K/UL — SIGNIFICANT CHANGE UP (ref 150–400)
PLATELET # BLD AUTO: 201 K/UL — SIGNIFICANT CHANGE UP (ref 150–400)
POTASSIUM SERPL-MCNC: 5.2 MMOL/L — SIGNIFICANT CHANGE UP (ref 3.5–5.3)
POTASSIUM SERPL-MCNC: 5.2 MMOL/L — SIGNIFICANT CHANGE UP (ref 3.5–5.3)
POTASSIUM SERPL-MCNC: 5.3 MMOL/L — SIGNIFICANT CHANGE UP (ref 3.5–5.3)
POTASSIUM SERPL-MCNC: 5.3 MMOL/L — SIGNIFICANT CHANGE UP (ref 3.5–5.3)
POTASSIUM SERPL-SCNC: 5.2 MMOL/L — SIGNIFICANT CHANGE UP (ref 3.5–5.3)
POTASSIUM SERPL-SCNC: 5.2 MMOL/L — SIGNIFICANT CHANGE UP (ref 3.5–5.3)
POTASSIUM SERPL-SCNC: 5.3 MMOL/L — SIGNIFICANT CHANGE UP (ref 3.5–5.3)
POTASSIUM SERPL-SCNC: 5.3 MMOL/L — SIGNIFICANT CHANGE UP (ref 3.5–5.3)
PROT SERPL-MCNC: 6.3 G/DL — LOW (ref 6.6–8.7)
PROT SERPL-MCNC: 6.3 G/DL — LOW (ref 6.6–8.7)
RBC # BLD: 3.81 M/UL — LOW (ref 4.2–5.8)
RBC # BLD: 3.81 M/UL — LOW (ref 4.2–5.8)
RBC # FLD: 12.9 % — SIGNIFICANT CHANGE UP (ref 10.3–14.5)
RBC # FLD: 12.9 % — SIGNIFICANT CHANGE UP (ref 10.3–14.5)
SODIUM SERPL-SCNC: 136 MMOL/L — SIGNIFICANT CHANGE UP (ref 135–145)
SODIUM SERPL-SCNC: 136 MMOL/L — SIGNIFICANT CHANGE UP (ref 135–145)
SODIUM SERPL-SCNC: 137 MMOL/L — SIGNIFICANT CHANGE UP (ref 135–145)
SODIUM SERPL-SCNC: 137 MMOL/L — SIGNIFICANT CHANGE UP (ref 135–145)
WBC # BLD: 12.26 K/UL — HIGH (ref 3.8–10.5)
WBC # BLD: 12.26 K/UL — HIGH (ref 3.8–10.5)
WBC # FLD AUTO: 12.26 K/UL — HIGH (ref 3.8–10.5)
WBC # FLD AUTO: 12.26 K/UL — HIGH (ref 3.8–10.5)

## 2023-11-19 PROCEDURE — 71045 X-RAY EXAM CHEST 1 VIEW: CPT | Mod: 26

## 2023-11-19 PROCEDURE — 99024 POSTOP FOLLOW-UP VISIT: CPT

## 2023-11-19 RX ORDER — ASPIRIN/CALCIUM CARB/MAGNESIUM 324 MG
1 TABLET ORAL
Refills: 0 | DISCHARGE

## 2023-11-19 RX ORDER — METOPROLOL TARTRATE 50 MG
0.5 TABLET ORAL
Refills: 0 | DISCHARGE

## 2023-11-19 RX ORDER — ASPIRIN/CALCIUM CARB/MAGNESIUM 324 MG
1 TABLET ORAL
Qty: 30 | Refills: 1
Start: 2023-11-19 | End: 2024-01-17

## 2023-11-19 RX ORDER — ROSUVASTATIN CALCIUM 5 MG/1
1 TABLET ORAL
Qty: 30 | Refills: 1
Start: 2023-11-19 | End: 2024-01-17

## 2023-11-19 RX ORDER — HYDROCHLOROTHIAZIDE 25 MG
1 TABLET ORAL
Refills: 0 | DISCHARGE

## 2023-11-19 RX ORDER — IRBESARTAN 75 MG/1
1 TABLET ORAL
Refills: 0 | DISCHARGE

## 2023-11-19 RX ORDER — ROSUVASTATIN CALCIUM 5 MG/1
1 TABLET ORAL
Refills: 0 | DISCHARGE

## 2023-11-19 RX ORDER — FUROSEMIDE 40 MG
1 TABLET ORAL
Qty: 5 | Refills: 0
Start: 2023-11-19 | End: 2023-11-23

## 2023-11-19 RX ORDER — AMLODIPINE BESYLATE 2.5 MG/1
1 TABLET ORAL
Refills: 0 | DISCHARGE

## 2023-11-19 RX ORDER — SENNA PLUS 8.6 MG/1
2 TABLET ORAL
Qty: 14 | Refills: 0
Start: 2023-11-19 | End: 2023-11-25

## 2023-11-19 RX ORDER — ACETAMINOPHEN 500 MG
2 TABLET ORAL
Qty: 0 | Refills: 0 | DISCHARGE
Start: 2023-11-19

## 2023-11-19 RX ORDER — OXYCODONE HYDROCHLORIDE 5 MG/1
1 TABLET ORAL
Qty: 20 | Refills: 0
Start: 2023-11-19 | End: 2023-11-23

## 2023-11-19 RX ORDER — METOPROLOL TARTRATE 50 MG
1 TABLET ORAL
Qty: 60 | Refills: 1
Start: 2023-11-19 | End: 2024-01-17

## 2023-11-19 RX ADMIN — Medication 40 MILLIGRAM(S): at 05:33

## 2023-11-19 RX ADMIN — PANTOPRAZOLE SODIUM 40 MILLIGRAM(S): 20 TABLET, DELAYED RELEASE ORAL at 09:08

## 2023-11-19 RX ADMIN — POLYETHYLENE GLYCOL 3350 17 GRAM(S): 17 POWDER, FOR SOLUTION ORAL at 09:08

## 2023-11-19 RX ADMIN — GABAPENTIN 100 MILLIGRAM(S): 400 CAPSULE ORAL at 05:33

## 2023-11-19 RX ADMIN — Medication 500 MILLIGRAM(S): at 05:33

## 2023-11-19 RX ADMIN — Medication 81 MILLIGRAM(S): at 09:08

## 2023-11-19 RX ADMIN — SODIUM CHLORIDE 3 MILLILITER(S): 9 INJECTION INTRAMUSCULAR; INTRAVENOUS; SUBCUTANEOUS at 05:38

## 2023-11-19 RX ADMIN — Medication 25 MILLIGRAM(S): at 05:34

## 2023-11-19 RX ADMIN — SODIUM CHLORIDE 3 MILLILITER(S): 9 INJECTION INTRAMUSCULAR; INTRAVENOUS; SUBCUTANEOUS at 13:22

## 2023-11-19 NOTE — DISCHARGE NOTE NURSING/CASE MANAGEMENT/SOCIAL WORK - NSDCFUADDAPPT_GEN_ALL_CORE_FT
Please follow up with Dr. Henao in 1-2 weeks. Please call office Monday to schedule follow up appointment. The cardiac surgery office is located on the first floor of [facility] at [address] [place], [place]. Please enter through the [email or web address]. A [facility] employee will then direct you where to go.     Please follow up with your Cardiologist and Primary Care Physician 1-2 weeks from discharge.     Your Care Navigator Nurse Practitioner will be in touch to see you in your home within a few days from discharge. The Follow Your Heart program can help ensure you understand your medications, discharge instructions and answer any questions you may have at that time. They are also a great source to address concerns during the day and may be reached at [phone].

## 2023-11-19 NOTE — DISCHARGE NOTE NURSING/CASE MANAGEMENT/SOCIAL WORK - PATIENT PORTAL LINK FT
You can access the FollowMyHealth Patient Portal offered by [facility] by registering at the following website: [email or web address]. By joining [employer]’s FollowMyHealth portal, you will also be able to view your health information using other applications (apps) compatible with our system.

## 2023-11-19 NOTE — CHART NOTE - NSCHARTNOTEFT_GEN_A_CORE
approx 1130 went to patient's bedside to remove L CT as output had been minimal. Pt reported SOB and that his oxygen saturation dropped while walking to bathroom.  Upon standing pt up, tube was noted to already be dislodged.  exam showed diminished BS on left side. stat CXR confirmed presence of mod to large lateral left pneumothorax. pt placed on NRB. consent obtained. left pigtail placed (see procedure note for additional details). pt initially placed to suction with large amount of air evacuated, pt experiencing sharp left CP. tube placed back to waterseal with improvement in pain (likely from re-expansion) f/u CXR pending, tube currently with tidaling.   Dr. Padilla aware
approx 1130 went to patient's bedside to remove L CT as output had been minimal. Pt reported SOB and that his oxygen saturation dropped while walking to bathroom.  Upon standing pt up, tube was noted to already be dislodged.  exam showed diminished BS on left side. stat CXR confirmed presence of mod to large lateral left pneumothorax. pt placed on NRB. consent obtained. left pigtail placed (see procedure note for additional details). pt initially placed to suction with large amount of air evacuated, pt experiencing sharp left CP. tube placed back to waterseal with improvement in pain (likely from re-expansion) f/u CXR pending, tube currently with tidaling.   Dr. Padilla aware
pt requires rolling walker due to deconditioning after coronary artery bypass grafting surgery for mobility related activities of daily living in the house.

## 2023-11-19 NOTE — DISCHARGE NOTE PROVIDER - NSDCCPCAREPLAN_GEN_ALL_CORE_FT
PRINCIPAL DISCHARGE DIAGNOSIS  Diagnosis: CAD (coronary artery disease)  Assessment and Plan of Treatment: You had a CABG X 2, Excision of Pericardial cyst with Dr. Henao on 11/14.   please refer to discharge instructions in your folder  shower daily with soap and water to the incision, no bathing/hot tubs/pools/swimming  no lotions/creams to incisions  take all your medications as prescribed  keep your follow up appointments      SECONDARY DISCHARGE DIAGNOSES  Diagnosis: Pericardial cyst  Assessment and Plan of Treatment: As above, excised in surgery.    Diagnosis: Hypertension  Assessment and Plan of Treatment: You have hypertension, or high blood pressure. It is very important to continue your medication as ordered. High blood pressure increases your risk for heart attack, stroke and kidney disease. It does not usually cause symptoms but it can be serious.   1. Be sure to take all of your medication as prescribed.  2. You may find it helpful to get a home blood pressure meter and check your blood pressure periodically.  3. Lifestyle changes can improve your blood pressure and lessen the amount of medication you need, such as: losing weight, choosing a diet low in fat and rich in fruits, vegetables and low-fat dairy products, reducing the amount of salt you eat, cut down on alcohol (to less than two drinks per day) and do something active most days for 30 minutes or more.    Diagnosis: Hyperlipidemia  Assessment and Plan of Treatment: Continue Statin. Please follow up with your Cardiologist and Primary Care Physician 1-2 weeks from discharge.

## 2023-11-19 NOTE — DISCHARGE NOTE PROVIDER - CARE PROVIDERS DIRECT ADDRESSES
[email or web address]

## 2023-11-19 NOTE — DISCHARGE NOTE PROVIDER - NSDCHHCONTRAHOME_GEN_ALL_CORE
Wound - risk of deterioration/infection Activity restrictions due to illness/surgery/Wound - risk of deterioration/infection

## 2023-11-19 NOTE — DISCHARGE NOTE NURSING/CASE MANAGEMENT/SOCIAL WORK - NSDCPEFALRISK_GEN_ALL_CORE
For information on Fall & Injury Prevention, visit: [email or web address] OR  [email or web address] OR  https://www.cdc.gov/steadi/patient.html

## 2023-11-19 NOTE — DISCHARGE NOTE PROVIDER - NSDCCPTREATMENT_GEN_ALL_CORE_FT
unk PRINCIPAL PROCEDURE  Procedure: CABG, with CHARBEL  Findings and Treatment: Two vessel CABG (SVG-Ramus, LIMA-LAD)      SECONDARY PROCEDURE  Procedure: Surgical removal of pericardial cyst  Findings and Treatment:

## 2023-11-19 NOTE — DISCHARGE NOTE PROVIDER - NSDCFUADDAPPT_GEN_ALL_CORE_FT
Please follow up with Dr. Henao on  _____ at [facility]. The cardiac surgery office is located on the first floor of [facility] at [address], [place], [place], [place]. Please enter through the lobby. A [facility] employee will then direct you where to go.     Please follow up with your Cardiologist and Primary Care Physician 1-2 weeks from discharge.     Your Care Navigator Nurse Practitioner will be in touch to see you in your home within a few days from discharge. The Follow Your Heart program can help ensure you understand your medications, discharge instructions and answer any questions you may have at that time. They are also a great source to address concerns during the day and may be reached at [phone].  Please follow up with Dr. Henao in 1-2 weeks. Please call office Monday to schedule follow up appointment. The cardiac surgery office is located on the first floor of Health system at [address] [place], [place]. Please enter through the [employer]. A Health system employee will then direct you where to go.     Please follow up with your Cardiologist and Primary Care Physician 1-2 weeks from discharge.     Your Care Navigator Nurse Practitioner will be in touch to see you in your home within a few days from discharge. The Follow Your Heart program can help ensure you understand your medications, discharge instructions and answer any questions you may have at that time. They are also a great source to address concerns during the day and may be reached at [phone].

## 2023-11-19 NOTE — DISCHARGE NOTE PROVIDER - PROVIDER TOKENS
PROVIDER:[TOKEN:[[id number]],ESTABLISHEDPATIENT:[T]] PROVIDER:[TOKEN:[[id number]],ESTABLISHEDPATIENT:[T]],PROVIDER:[TOKEN:[[id number]]]

## 2023-11-19 NOTE — PROGRESS NOTE ADULT - PROBLEM SELECTOR PLAN 1
S/p CABG, pericardial cyst excision  Beta blocker as tolerated by HR and SBP   Lipitor for chronic graft patency prophylaxis  Aspirin for acute graft closure prophylaxis  Encourage PO intake  Encourage OOB to chair and ambulation with PT  Encourage deep breathing exercised and coughing  Chest PT and IS use with bedside nurse
S/p elective CABG X 2 with pericardial cyst removal on 11/14/23 with Dr. Henao.    Postoperative course remains uneventful at this time.   Neurologically intact. Hemodynamically stable.  Continue lopressor as tolerated by HR and BP.   Continue aspirin for acute graft closure prophylaxis.  Continue statin for chronic graft patency prophylaxis.  Continue Amiodarone per  mg BID x 3 days postop.  Continue Vitamin C per ERP for atrial fibrillation prophylaxis.  Oxygenating well, coughing and deep breathing exercises/incentive spirometry encouraged.   Follow up AM CXR.   D/c Left CT this morning if output remains minimal.  Continue diuresis with Lasix 40mg PO QD, monitor strict I/Os, replenish electrolytes PRN to keep K>4 and Mg>2.   Continue with PT, increase activity as tolerated.  FS AC+HS with coverage for glycemic control.  Tylenol, Oxy PRN for analgesia.  Continue bowel regimen PRN constipation.   Case and plan to be reviewed / discussed with CT Surgery attending / team during AM rounds.
S/p CABG, pericardial cyst excision  Wean pressor as tolerated  Beta blocker as tolerated by HR and SBP once off pressors  Lipitor for chronic graft patency prophylaxis  Aspirin for acute graft closure prophylaxis  Encourage PO intake  Encourage OOB to chair and ambulation with PT  Encourage deep breathing exercised and coughing  Chest PT and IS use with bedside nurse
S/p elective CABG X 2 with pericardial cyst removal on 11/14/23 with Dr. Henao.    Left CT dislodged 10/17 with subsequent Left PTX, L pigtail catheter placed with resolution of pneumothorax on CXR. L PTC removed 10/18.   Neurologically intact. Hemodynamically stable.  Continue lopressor as tolerated by HR and BP.   Continue aspirin for acute graft closure prophylaxis.  Continue statin for chronic graft patency prophylaxis.  Amiodarone per ERP completed.  Continue Vitamin C per ERP for atrial fibrillation prophylaxis.  Oxygenating well, coughing and deep breathing exercises/incentive spirometry encouraged.   Follow up AM CXR.   Continue diuresis with Lasix 40mg PO QD, monitor strict I/Os, replenish electrolytes PRN to keep K>4 and Mg>2.   Continue with PT, increase activity as tolerated.  FS AC+HS with coverage for glycemic control.  Tylenol, Oxy PRN for analgesia.  Continue bowel regimen PRN constipation.   Case and plan to be reviewed / discussed with CT Surgery attending / team during AM rounds.
S/p elective CABG X 2 with pericardial cyst removal on 11/14/23 with Dr. Henao.    Left CT dislodged 10/17 with subsequent Left PTX, L pigtail catheter placed with resolution of pneumothorax on CXR.  Neurologically intact. Hemodynamically stable.  Continue lopressor as tolerated by HR and BP.   Continue aspirin for acute graft closure prophylaxis.  Continue statin for chronic graft patency prophylaxis.  Amiodarone per ERP completed.  Continue Vitamin C per ERP for atrial fibrillation prophylaxis.  Oxygenating well, coughing and deep breathing exercises/incentive spirometry encouraged.   Follow up AM CXR.   Continue diuresis with Lasix 40mg PO QD, monitor strict I/Os, replenish electrolytes PRN to keep K>4 and Mg>2.   Continue with PT, increase activity as tolerated.  FS AC+HS with coverage for glycemic control.  Tylenol, Oxy PRN for analgesia.  Continue bowel regimen PRN constipation.   Case and plan to be reviewed / discussed with CT Surgery attending / team during AM rounds.

## 2023-11-19 NOTE — DISCHARGE NOTE PROVIDER - CARE PROVIDER_API CALL
Tony Henao  Thoracic and Cardiac Surgery  [address] [place] [address]  Phone: [phone]  Fax: [phone]  Established Patient  Follow Up Time:    Tony Henao  Thoracic and Cardiac Surgery  [address] [place] [address]  Phone: [phone]  Fax: [phone]  Established Patient  Follow Up Time:     All Feldman  [address] [facility], [facility] [address]  [place] [address]  Phone: [phone]  Fax: [phone]  Follow Up Time:

## 2023-11-19 NOTE — PROGRESS NOTE ADULT - ASSESSMENT
78M with PMHx prediabetes, HLD, HTN, CAD underwent CABG x 3 on 11/14/23. He has had prediabetes for about 10 years, diet managed by PCP.    1. Prediabetes, a1c 6.2% - glucoses controlled  - Transitioned off insulin gtt post operatively  - Admelog 4 units TID and sliding scale coverage  - Will follow    2. CAD - S/p CABG, care per CT surgery team    3. HLD - Continue statin  
78 year old male with a medical history of HLD, HTN, prostate cancer s/p radiation therapy, noted with exertional angina now s/p cath revealing Multivessel CAD and pericardial cyst. Patient underwent elective CABG X 2 with pericardial cyst removal on 11/14/23 with Dr. Henao.  Postoperative course significant for L PTX secondary to L CT dislodged, resolution of L PTX after placement of pigtail catheter. 
78M with PMHx prediabetes, HLD, HTN, CAD underwent CABG x 3 on 11/14/23. He has had prediabetes for about 10 years, diet managed by PCP.    1. Prediabetes, a1c 6.2%  - Transitioned off insulin gtt post operatively  - Glucoses controlled on sliding scale coverage  - Recommend to follow up with PCP for a1c monitoring  - Encouraged healthy diet choices with exercise/lifestyle modifications  - Will sign off, please call with any questions    2. CAD - S/p CABG, care per CT surgery team    3. HLD - Continue statin
78 year old male with a medical history of HLD, HTN, prostate cancer s/p radiation therapy, noted with exertional angina now s/p cath revealing Multivessel CAD and pericardial cyst. Patient underwent elective CABG X 2 with pericardial cyst removal on 11/14/23 with Dr. Henao.  Postoperative course remains uneventful at this time. 
77 y/o male History of HLD, HTN, prostate cancer -s/p radiation therapy.  Patient had some exertional angina now s/p cath revealing MVD and pericardial cyst. On 11/14 underwent CABG/pericardial cyst removal. 
78 year old male with a medical history of HLD, HTN, prostate cancer s/p radiation therapy, noted with exertional angina now s/p cath revealing Multivessel CAD and pericardial cyst. Patient underwent elective CABG X 2 with pericardial cyst removal on 11/14/23 with Dr. eHnao.  Postoperative course significant for L PTX secondary to L CT dislodged, resolution of L PTX after placement of pigtail catheter. 
79 y/o male History of HLD, HTN, prostate cancer -s/p radiation therapy.  Patient had some exertional angina now s/p cath revealing MVD and pericardial cyst. On 11/14 underwent CABG/pericardial cyst removal.

## 2023-11-19 NOTE — PROGRESS NOTE ADULT - PROBLEM SELECTOR PLAN 5
S/p excision as above.
Lovenox and SCDs for DVT prophylaxis  Protonix for GI prophylaxis  continue with bowel regimen  Strict glucose management and abx for SWI PPX
S/p excision as above.
Lovenox and SCDs for DVT prophylaxis.  Protonix for GI prophylaxis.  Continue with bowel regimen.  Strict glucose management for SWI PPX.
Lovenox and SCDs for DVT prophylaxis  Protonix for GI prophylaxis  continue with bowel regimen  Strict glucose management and abx for SWI PPX

## 2023-11-19 NOTE — PROGRESS NOTE ADULT - PROBLEM SELECTOR PLAN 4
S/p exision. As above
Continue Beta blocker as tolerated by HR and SBP.
S/p excision as above.
Continue Beta blocker as tolerated by HR and SBP.
S/p exision. As above

## 2023-11-19 NOTE — DISCHARGE NOTE PROVIDER - NSDCFUSCHEDAPPT_GEN_ALL_CORE_FT
[facility]  [address] [facility]  Scheduled Appointment: 01/03/2024

## 2023-11-19 NOTE — PROGRESS NOTE ADULT - PROBLEM SELECTOR PLAN 6
Lovenox and SCDs for DVT prophylaxis.  Protonix for GI prophylaxis.  Continue with bowel regimen.  Strict glucose management for SWI PPX.
Lovenox and SCDs for DVT prophylaxis.  Protonix for GI prophylaxis.  Continue with bowel regimen.  Strict glucose management for SWI PPX.

## 2023-11-19 NOTE — DISCHARGE NOTE PROVIDER - NSDCFUADDINST_GEN_ALL_CORE_FT
Please call the Cardiothoracic Surgery office at [phone] if you are experiencing any shortness of breath, chest pain, fevers or chills, drainage from the incisions, persistent nausea, vomiting or if you have any questions about your medications. If the symptoms are severe, call 911 and go to the nearest hospital.  Please call the Cardiothoracic Surgery office at [phone] if you are experiencing any shortness of breath, chest pain, fevers or chills, drainage from the incisions, persistent nausea, vomiting or if you have any questions about your medications. If the symptoms are severe, call 911 and go to the nearest hospital. You can also call [phone] for an emergency [facility] ambulance, which will take you to the closest [facility].    If you need any assistance for making any appointments for a new consult or referral in any specialty, please call our [facility] Clinical Coordination Center at [phone]. l.

## 2023-11-19 NOTE — DISCHARGE NOTE PROVIDER - NSDCMRMEDTOKEN_GEN_ALL_CORE_FT
aspirin 81 mg oral tablet: 1 tab(s) orally once a day  Avapro 150 mg oral tablet: 1 tab(s) orally once a day  Crestor 20 mg oral tablet: 1 tab(s) orally once a day (at bedtime)  HydroDIURIL 25 mg oral tablet: 1 tab(s) orally once a day  metoprolol succinate 25 mg oral tablet, extended release: 0.5 tab(s) orally once a day (at bedtime)  Multiple Vitamins oral tablet: 1 tab(s) orally once a day  Norvasc 5 mg oral tablet: 1 tab(s) orally once a day   acetaminophen 325 mg oral tablet: 2 tab(s) orally every 6 hours as needed for as needed for mild-moderate pain/fever  Crestor 20 mg oral tablet: 1 tab(s) orally once a day (at bedtime)  Multiple Vitamins oral tablet: 1 tab(s) orally once a day   acetaminophen 325 mg oral tablet: 2 tab(s) orally every 6 hours as needed for as needed for mild-moderate pain/fever  aspirin 81 mg oral delayed release tablet: 1 tab(s) orally once a day  Crestor 20 mg oral tablet: 1 tab(s) orally once a day (at bedtime)  furosemide 40 mg oral tablet: 1 tab(s) orally once a day  metoprolol tartrate 25 mg oral tablet: 1 tab(s) orally 2 times a day  Multiple Vitamins oral tablet: 1 tab(s) orally once a day  oxyCODONE 5 mg oral tablet: 1 tab(s) orally every 6 hours MDD: 4 pills  senna leaf extract oral tablet: 2 tab(s) orally once a day (at bedtime)

## 2023-11-19 NOTE — PROGRESS NOTE ADULT - SUBJECTIVE AND OBJECTIVE BOX
Brief summary:  78yMale seen and examined at the bedside. Patient lying in bed comfortable, no acute distress. Patient reports he is more comfortable now that the pigtail catheter is removed. Patient denies acute pain with radiating or aggravating factors. He denies chest pain, shortness of breath, palpitations, headache, dizziness, nausea, or vomiting.     Significant Overnight events/24 Hours: None      PAST MEDICAL & SURGICAL HISTORY:  Liver mass    Hyperlipidemia    Hypertension    Arteriosclerotic heart disease (ASHD)    Prostate cancer    History of radiation therapy    COVID-19 vaccine series completed    [place] (hard of hearing)    H/o Lyme disease    Former smoker    H/O varicose veins    Lumbar herniated disc    H/O total knee replacement, left    H/O removal of cyst    H/O resection of liver    Medications:  acetaminophen     Tablet .. 650 milliGRAM(s) Oral every 6 hours PRN  ascorbic acid 500 milliGRAM(s) Oral two times a day  aspirin enteric coated 81 milliGRAM(s) Oral daily  atorvastatin 80 milliGRAM(s) Oral at bedtime  bisacodyl Suppository 10 milliGRAM(s) Rectal once  dextrose 5%. 1000 milliLiter(s) IV Continuous <Continuous>  dextrose 5%. 1000 milliLiter(s) IV Continuous <Continuous>  dextrose 50% Injectable 25 Gram(s) IV Push once  dextrose 50% Injectable 50 milliLiter(s) IV Push every 15 minutes  dextrose 50% Injectable 25 milliLiter(s) IV Push every 15 minutes  enoxaparin Injectable 40 milliGRAM(s) SubCutaneous every 24 hours  furosemide    Tablet 40 milliGRAM(s) Oral daily  gabapentin 100 milliGRAM(s) Oral every 8 hours  glucagon  Injectable 1 milliGRAM(s) IntraMuscular once  insulin lispro (ADMELOG) corrective regimen sliding scale   SubCutaneous Before meals and at bedtime  lidocaine   4% Patch 1 Patch Transdermal every 24 hours  melatonin 5 milliGRAM(s) Oral at bedtime  metoprolol tartrate 25 milliGRAM(s) Oral two times a day  ondansetron Injectable 4 milliGRAM(s) IV Push every 6 hours PRN  oxyCODONE    IR 5 milliGRAM(s) Oral every 4 hours PRN  oxyCODONE    IR 10 milliGRAM(s) Oral every 4 hours PRN  pantoprazole    Tablet 40 milliGRAM(s) Oral daily  polyethylene glycol 3350 17 Gram(s) Oral daily  senna 2 Tablet(s) Oral at bedtime  sodium chloride 0.9% lock flush 3 milliLiter(s) IV Push every 8 hours      MEDICATIONS  (PRN):  acetaminophen     Tablet .. 650 milliGRAM(s) Oral every 6 hours PRN Mild Pain (1 - 3)  ondansetron Injectable 4 milliGRAM(s) IV Push every 6 hours PRN Nausea and/or Vomiting  oxyCODONE    IR 5 milliGRAM(s) Oral every 4 hours PRN Moderate Pain (4 - 6)  oxyCODONE    IR 10 milliGRAM(s) Oral every 4 hours PRN Severe Pain (7 - 10)      Daily Review:    Weight (kg): 114.6 (11-18 @ 06:00)                            10.8   13.95 )-----------( 200      ( 18 Nov 2023 05:15 )             33.6   11-18    137  |  99  |  40.5<H>  ----------------------------<  127<H>  4.9   |  28.0  |  1.30    Ca    9.2      18 Nov 2023 05:15  Mg     2.1     11-18      T(C): 36.4 (11-18-23 @ 23:44), Max: 36.9 (11-18-23 @ 12:00)  HR: 84 (11-18-23 @ 23:44) (75 - 94)  BP: 109/65 (11-18-23 @ 23:44) (100/64 - 134/77)  RR: 18 (11-18-23 @ 23:44) (18 - 18)  SpO2: 95% (11-18-23 @ 23:44) (91% - 96%)  Wt(kg): --    CAPILLARY BLOOD GLUCOSE    POCT Blood Glucose.: 132 mg/dL (18 Nov 2023 21:01)  POCT Blood Glucose.: 125 mg/dL (18 Nov 2023 17:01)  POCT Blood Glucose.: 134 mg/dL (18 Nov 2023 12:03)  POCT Blood Glucose.: 122 mg/dL (18 Nov 2023 08:34)      I&O's Summary    17 Nov 2023 07:01  -  18 Nov 2023 07:00  --------------------------------------------------------  IN: 1140 mL / OUT: 3215 mL / NET: -2075 mL    18 Nov 2023 07:01  -  19 Nov 2023 01:59  --------------------------------------------------------  IN: 920 mL / OUT: 0 mL / NET: 920 mL      Physical Exam  General: alert, awake, no acute distress  Neuro: A+O x 3, non-focal, speech clear and intact  Neck: supple, trachea midline, no JVD noted  Pulm: diminished breath sounds bilaterally, no accessory muscle use noted   CV: RRR, +S1S2  Abd: soft, NT, ND, + bowel sounds  Ext: +DP Pulses b/l, +PT pulses, trace edema  Skin: warm, dry, well perfused  Inc: Mediastinal sternal incision C/D/I/stable w/ dressing, Left LE vein harvest site C/D/I with dressing

## 2023-11-19 NOTE — PROGRESS NOTE ADULT - PROVIDER SPECIALTY LIST ADULT
Critical Care
Endocrinology
Critical Care
Critical Care
Endocrinology
CT Surgery

## 2023-11-19 NOTE — PROGRESS NOTE ADULT - PROBLEM SELECTOR PLAN 2
Continue statin
Continue Statin.
Continue statin
Left PTX seen on CXR, left pigtail catheter placed to suction with evacuation of air.   Pt with sharp left CP, tube placed to waterseal with improvement of pain.   F/U CXR with re-expansion of lung.   Maintain L PTC to waterseal overnight.   F/U morning CXR.
Left PTX seen on CXR, left pigtail catheter placed to suction with evacuation of air.   Pt with sharp left CP, tube placed to waterseal with improvement of pain.   F/U CXR with re-expansion of lung.   L PTX now resolved and CXR stable, L PTC removed 11/18.

## 2023-11-19 NOTE — PROGRESS NOTE ADULT - PROBLEM SELECTOR PLAN 3
Cont. Beta blocker as tolerated by HR and SBP
Continue Statin.
Continue Statin.
Initiate BB once off pressors
Continue Beta blocker as tolerated by HR and SBP.

## 2023-11-20 ENCOUNTER — APPOINTMENT (OUTPATIENT)
Dept: CARE COORDINATION | Facility: HOME HEALTH | Age: 78
End: 2023-11-20
Payer: MEDICARE

## 2023-11-20 DIAGNOSIS — I10 ESSENTIAL (PRIMARY) HYPERTENSION: ICD-10-CM

## 2023-11-20 PROBLEM — Z86.79 PERSONAL HISTORY OF OTHER DISEASES OF THE CIRCULATORY SYSTEM: Chronic | Status: ACTIVE | Noted: 2023-11-08

## 2023-11-20 PROBLEM — E78.5 HYPERLIPIDEMIA, UNSPECIFIED: Chronic | Status: ACTIVE | Noted: 2023-11-08

## 2023-11-20 PROBLEM — Z92.29 PERSONAL HISTORY OF OTHER DRUG THERAPY: Chronic | Status: ACTIVE | Noted: 2023-11-08

## 2023-11-20 PROBLEM — C61 MALIGNANT NEOPLASM OF PROSTATE: Chronic | Status: ACTIVE | Noted: 2023-11-08

## 2023-11-20 PROBLEM — R16.0 HEPATOMEGALY, NOT ELSEWHERE CLASSIFIED: Chronic | Status: ACTIVE | Noted: 2023-11-08

## 2023-11-20 PROBLEM — I25.10 ATHEROSCLEROTIC HEART DISEASE OF NATIVE CORONARY ARTERY WITHOUT ANGINA PECTORIS: Chronic | Status: ACTIVE | Noted: 2023-11-08

## 2023-11-20 PROBLEM — H91.90 UNSPECIFIED HEARING LOSS, UNSPECIFIED EAR: Chronic | Status: ACTIVE | Noted: 2023-11-08

## 2023-11-20 PROCEDURE — 99024 POSTOP FOLLOW-UP VISIT: CPT

## 2023-11-21 ENCOUNTER — TRANSCRIPTION ENCOUNTER (OUTPATIENT)
Age: 78
End: 2023-11-21

## 2023-11-21 PROBLEM — Z92.3 PERSONAL HISTORY OF IRRADIATION: Chronic | Status: ACTIVE | Noted: 2023-11-08

## 2023-11-21 PROBLEM — I10 HYPERTENSION: Status: ACTIVE | Noted: 2022-04-13

## 2023-11-21 PROBLEM — Z87.891 PERSONAL HISTORY OF NICOTINE DEPENDENCE: Chronic | Status: ACTIVE | Noted: 2023-11-08

## 2023-11-21 PROBLEM — I10 ESSENTIAL (PRIMARY) HYPERTENSION: Chronic | Status: ACTIVE | Noted: 2023-11-08

## 2023-11-21 LAB
SURGICAL PATHOLOGY STUDY: SIGNIFICANT CHANGE UP
SURGICAL PATHOLOGY STUDY: SIGNIFICANT CHANGE UP

## 2023-11-27 ENCOUNTER — NON-APPOINTMENT (OUTPATIENT)
Age: 78
End: 2023-11-27

## 2023-11-30 PROBLEM — Z95.1 S/P CABG X 2: Status: ACTIVE | Noted: 2023-11-20

## 2023-11-30 PROBLEM — Q24.8 PERICARDIAL CYST: Status: ACTIVE | Noted: 2023-11-01

## 2023-12-06 ENCOUNTER — APPOINTMENT (OUTPATIENT)
Dept: CARDIOTHORACIC SURGERY | Facility: CLINIC | Age: 78
End: 2023-12-06
Payer: MEDICARE

## 2023-12-06 VITALS
RESPIRATION RATE: 19 BRPM | BODY MASS INDEX: 32.73 KG/M2 | SYSTOLIC BLOOD PRESSURE: 111 MMHG | HEIGHT: 74 IN | WEIGHT: 255 LBS | OXYGEN SATURATION: 98 % | HEART RATE: 84 BPM | DIASTOLIC BLOOD PRESSURE: 71 MMHG

## 2023-12-06 DIAGNOSIS — Z95.1 PRESENCE OF AORTOCORONARY BYPASS GRAFT: ICD-10-CM

## 2023-12-06 DIAGNOSIS — Z78.9 OTHER SPECIFIED HEALTH STATUS: ICD-10-CM

## 2023-12-06 DIAGNOSIS — Q24.8 OTHER SPECIFIED CONGENITAL MALFORMATIONS OF HEART: ICD-10-CM

## 2023-12-06 PROCEDURE — 99024 POSTOP FOLLOW-UP VISIT: CPT

## 2023-12-06 RX ORDER — HYDROCHLOROTHIAZIDE 25 MG
25 TABLET ORAL DAILY
Refills: 0 | Status: COMPLETED | COMMUNITY
End: 2023-12-06

## 2023-12-06 RX ORDER — ROSUVASTATIN CALCIUM 20 MG/1
20 TABLET, FILM COATED ORAL
Refills: 0 | Status: ACTIVE | COMMUNITY

## 2023-12-06 RX ORDER — AMLODIPINE BESYLATE 5 MG/1
5 TABLET ORAL DAILY
Refills: 0 | Status: COMPLETED | COMMUNITY
End: 2023-12-06

## 2023-12-06 RX ORDER — IRBESARTAN 150 MG/1
150 TABLET, FILM COATED ORAL DAILY
Refills: 0 | Status: COMPLETED | COMMUNITY
End: 2023-12-06

## 2023-12-06 RX ORDER — FUROSEMIDE 40 MG/1
40 TABLET ORAL
Qty: 14 | Refills: 0 | Status: ACTIVE | COMMUNITY
Start: 2023-12-06 | End: 1900-01-01

## 2023-12-06 RX ORDER — ASPIRIN 81 MG
81 TABLET, DELAYED RELEASE (ENTERIC COATED) ORAL
Refills: 0 | Status: ACTIVE | COMMUNITY

## 2023-12-07 ENCOUNTER — NON-APPOINTMENT (OUTPATIENT)
Age: 78
End: 2023-12-07

## 2023-12-13 PROCEDURE — C1769: CPT

## 2023-12-13 PROCEDURE — 94760 N-INVAS EAR/PLS OXIMETRY 1: CPT

## 2023-12-13 PROCEDURE — 85027 COMPLETE CBC AUTOMATED: CPT

## 2023-12-13 PROCEDURE — 82435 ASSAY OF BLOOD CHLORIDE: CPT

## 2023-12-13 PROCEDURE — 84484 ASSAY OF TROPONIN QUANT: CPT

## 2023-12-13 PROCEDURE — 93312 ECHO TRANSESOPHAGEAL: CPT

## 2023-12-13 PROCEDURE — 80048 BASIC METABOLIC PNL TOTAL CA: CPT

## 2023-12-13 PROCEDURE — 82550 ASSAY OF CK (CPK): CPT

## 2023-12-13 PROCEDURE — 82803 BLOOD GASES ANY COMBINATION: CPT

## 2023-12-13 PROCEDURE — 93320 DOPPLER ECHO COMPLETE: CPT

## 2023-12-13 PROCEDURE — 36430 TRANSFUSION BLD/BLD COMPNT: CPT

## 2023-12-13 PROCEDURE — 82947 ASSAY GLUCOSE BLOOD QUANT: CPT

## 2023-12-13 PROCEDURE — 82553 CREATINE MB FRACTION: CPT

## 2023-12-13 PROCEDURE — 85018 HEMOGLOBIN: CPT

## 2023-12-13 PROCEDURE — 94002 VENT MGMT INPAT INIT DAY: CPT

## 2023-12-13 PROCEDURE — 85730 THROMBOPLASTIN TIME PARTIAL: CPT

## 2023-12-13 PROCEDURE — 97530 THERAPEUTIC ACTIVITIES: CPT

## 2023-12-13 PROCEDURE — 97116 GAIT TRAINING THERAPY: CPT

## 2023-12-13 PROCEDURE — 82330 ASSAY OF CALCIUM: CPT

## 2023-12-13 PROCEDURE — 71045 X-RAY EXAM CHEST 1 VIEW: CPT

## 2023-12-13 PROCEDURE — 93005 ELECTROCARDIOGRAM TRACING: CPT

## 2023-12-13 PROCEDURE — 86891 AUTOLOGOUS BLOOD OP SALVAGE: CPT

## 2023-12-13 PROCEDURE — 84132 ASSAY OF SERUM POTASSIUM: CPT

## 2023-12-13 PROCEDURE — 86923 COMPATIBILITY TEST ELECTRIC: CPT

## 2023-12-13 PROCEDURE — 83605 ASSAY OF LACTIC ACID: CPT

## 2023-12-13 PROCEDURE — 93325 DOPPLER ECHO COLOR FLOW MAPG: CPT

## 2023-12-13 PROCEDURE — 80053 COMPREHEN METABOLIC PANEL: CPT

## 2023-12-13 PROCEDURE — 84295 ASSAY OF SERUM SODIUM: CPT

## 2023-12-13 PROCEDURE — 83735 ASSAY OF MAGNESIUM: CPT

## 2023-12-13 PROCEDURE — P9045: CPT

## 2023-12-13 PROCEDURE — 82962 GLUCOSE BLOOD TEST: CPT

## 2023-12-13 PROCEDURE — 97163 PT EVAL HIGH COMPLEX 45 MIN: CPT

## 2023-12-13 PROCEDURE — 85610 PROTHROMBIN TIME: CPT

## 2023-12-13 PROCEDURE — 84100 ASSAY OF PHOSPHORUS: CPT

## 2023-12-13 PROCEDURE — 86850 RBC ANTIBODY SCREEN: CPT

## 2023-12-13 PROCEDURE — C1751: CPT

## 2023-12-13 PROCEDURE — 85014 HEMATOCRIT: CPT

## 2023-12-13 PROCEDURE — 85025 COMPLETE CBC W/AUTO DIFF WBC: CPT

## 2023-12-13 PROCEDURE — C8929: CPT

## 2023-12-13 PROCEDURE — 88305 TISSUE EXAM BY PATHOLOGIST: CPT

## 2023-12-13 PROCEDURE — P9016: CPT

## 2023-12-13 PROCEDURE — 36415 COLL VENOUS BLD VENIPUNCTURE: CPT

## 2023-12-13 PROCEDURE — 86901 BLOOD TYPING SEROLOGIC RH(D): CPT

## 2023-12-13 PROCEDURE — 86900 BLOOD TYPING SEROLOGIC ABO: CPT

## 2023-12-13 PROCEDURE — C1889: CPT

## 2023-12-19 ENCOUNTER — TRANSCRIPTION ENCOUNTER (OUTPATIENT)
Age: 78
End: 2023-12-19

## 2023-12-20 ENCOUNTER — APPOINTMENT (OUTPATIENT)
Dept: ORTHOPEDIC SURGERY | Facility: CLINIC | Age: 78
End: 2023-12-20

## 2024-01-03 ENCOUNTER — APPOINTMENT (OUTPATIENT)
Dept: ORTHOPEDIC SURGERY | Facility: CLINIC | Age: 79
End: 2024-01-03
Payer: OTHER MISCELLANEOUS

## 2024-01-03 VITALS — HEIGHT: 74 IN | BODY MASS INDEX: 32.73 KG/M2 | WEIGHT: 255 LBS

## 2024-01-03 PROCEDURE — 99213 OFFICE O/P EST LOW 20 MIN: CPT

## 2024-01-03 RX ORDER — METOPROLOL TARTRATE 75 MG/1
TABLET, FILM COATED ORAL
Refills: 0 | Status: ACTIVE | COMMUNITY

## 2024-01-03 NOTE — HISTORY OF PRESENT ILLNESS
[Lower back] : lower back [Work related] : work related [Gradual] : gradual [Sudden] : sudden [3] : 3 [2] : 2 [Radiating] : radiating [Occasional] : occasional [Sleep] : sleep [Sitting] : sitting [Standing] : standing [Walking] : walking [Retired] : Work status: retired [de-identified] : Patient Complaint - WC 3/3/95 LBP to right leg Pt states he feels about the same. He has good days and bad days in terms of pain. Worse with sitting/standing prolomged time 1/19/11: pt presents f/u lower back pain, radiculopathy, doing well with home therapy and use of Vicodin. 4/13/11 status quo. still with intermittant back pain and radiculopathy. taking norco prn. 8/31/11 c/o stiffness, hep 1/4/12: f/u lower back pain, no significant changes. Using Naprosyn PRN, walking for exercise. Pt notes he needs a knee replacement but is trying to strengthen it first. 4/4/12 f/u lbp walks 1 mile but limited by knee pain 8/1/12 f/u lbp, walks regularly 11/7/12 f/u lbp rad to R le, walks regularly. Requesting Norco 7.5/325 2/6/13: f/u lower back, notes cont pain. Not doing much HEP due to his knee hurting and colder weather. 9/11/13 f/u lbp. Had tka much improved 12/11/13: f/u LBP, notes cont pain, stiffness L>R. Some radicular pain into legs. Walking for exercise. Using Naprosyn for pain. 3/12/14 f/u lbp Rides bike 6/25/14 f/u lbp hep, takes Naprosyn 12/3/14 f/u lbp had 2 exacerbation 2 weeks ago getting off couch. Takes Aleve 10/14/15: f/u lbp. numbness right leg. medications. 1/27/16 f/u R sided lbp. Requesting patches. 5/4/16: f/u R sided low back pain, exacerbation of pain 3 wks ago that resolved. No shooting pains down the legs. Lidoderm not approved. 8/10/16 f/u lbp. Stiff/weather related Lidoderm helpful 11/9/16 f/u lbp radiating to R leg hep Lidoderm. 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 Requ Lidoderm 11/7/18 f/u LBP L > R Lidoderm/Naprosyn 11/13/19: f/u LBP good relief with Lidoderm patch. Prostate issues and undergoing radiation treatment 2/12/2020: f/u lbp. 7/8/2020: f/u lbp. stable. 10/14/2020: f/u lbp. status quo 1/27/21 f/u lbp no changes 4/21/21: f/u lbp. no changes. 7/14/21: f/u lbp. a couple recent flare-ups, aggravated by damp weather. No new injury. Non radiating. Lidoderm patch and Naprosyn helped. He has been trying to ride recumbent bike. 10/13/21: f/u lbp. 4/13/22: f/u lbp. 3 weeks ago he was coughing when he "threw" his back out. He continues to have persistent back pain radiating down his left leg. He saw his Vascular Surgeon and was prescribed mobic. He does not feel it is helping.  5/18/22  f/u Lbp to L ant thigh 9/7/22: f/bullard lbp. to left leg. Saw pain management and had 3 DANNY's. Now feeling much better.  also now having increasing leg swelling.  12/7/22: f/u lbp. Doing ok. Had 3 DANNY's by pain management with good relief.  3/15/23: F/U LBP. He feels the same.  Siff/weather changes.  Uses patches 6/14/23: f/u lbp. Stable. Patches as needed. Hip pain persists 9/20/23: f/u lbp. stable patches as needed.  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

## 2024-01-03 NOTE — PHYSICAL EXAM
[Flexion] : flexion [Extension] : extension [4___] : left dorsiflexors 4[unfilled]/5 [] : positive straight leg raise [TWNoteComboBox7] : forward flexion 15 degrees [de-identified] : extension 10 degrees [de-identified] : left lateral rotation 15 degrees [de-identified] : right lateral bending 15 degrees [TWNoteComboBox6] : right lateral rotation 15 degrees

## 2024-03-06 ENCOUNTER — APPOINTMENT (OUTPATIENT)
Dept: ORTHOPEDIC SURGERY | Facility: CLINIC | Age: 79
End: 2024-03-06
Payer: OTHER MISCELLANEOUS

## 2024-03-06 VITALS — WEIGHT: 255 LBS | BODY MASS INDEX: 32.73 KG/M2 | HEIGHT: 74 IN

## 2024-03-06 PROCEDURE — 99213 OFFICE O/P EST LOW 20 MIN: CPT

## 2024-03-06 RX ORDER — LIDOCAINE 5% 700 MG/1
5 PATCH TOPICAL
Qty: 20 | Refills: 2 | Status: ACTIVE | COMMUNITY
Start: 2024-03-06 | End: 1900-01-01

## 2024-03-06 NOTE — PHYSICAL EXAM
[Flexion] : flexion [Extension] : extension [4___] : left dorsiflexors 4[unfilled]/5 [] : pain with lateral rotation [TWNoteComboBox7] : forward flexion 15 degrees [de-identified] : extension 10 degrees [de-identified] : left lateral rotation 15 degrees [de-identified] : right lateral bending 15 degrees [TWNoteComboBox6] : right lateral rotation 15 degrees

## 2024-03-06 NOTE — HISTORY OF PRESENT ILLNESS
[Lower back] : lower back [Work related] : work related [Gradual] : gradual [Sudden] : sudden [Radiating] : radiating [Occasional] : occasional [Sitting] : sitting [Sleep] : sleep [Standing] : standing [Walking] : walking [Retired] : Work status: retired [4] : 4 [3] : 3 [de-identified] : Patient Complaint - WC 3/3/95 LBP to right leg Pt states he feels about the same. He has good days and bad days in terms of pain. Worse with sitting/standing prolomged time 1/19/11: pt presents f/u lower back pain, radiculopathy, doing well with home therapy and use of Vicodin. 4/13/11 status quo. still with intermittant back pain and radiculopathy. taking norco prn. 8/31/11 c/o stiffness, hep 1/4/12: f/u lower back pain, no significant changes. Using Naprosyn PRN, walking for exercise. Pt notes he needs a knee replacement but is trying to strengthen it first. 4/4/12 f/u lbp walks 1 mile but limited by knee pain 8/1/12 f/u lbp, walks regularly 11/7/12 f/u lbp rad to R le, walks regularly. Requesting Norco 7.5/325 2/6/13: f/u lower back, notes cont pain. Not doing much HEP due to his knee hurting and colder weather. 9/11/13 f/u lbp. Had tka much improved 12/11/13: f/u LBP, notes cont pain, stiffness L>R. Some radicular pain into legs. Walking for exercise. Using Naprosyn for pain. 3/12/14 f/u lbp Rides bike 6/25/14 f/u lbp hep, takes Naprosyn 12/3/14 f/u lbp had 2 exacerbation 2 weeks ago getting off couch. Takes Aleve 10/14/15: f/u lbp. numbness right leg. medications. 1/27/16 f/u R sided lbp. Requesting patches. 5/4/16: f/u R sided low back pain, exacerbation of pain 3 wks ago that resolved. No shooting pains down the legs. Lidoderm not approved. 8/10/16 f/u lbp. Stiff/weather related Lidoderm helpful 11/9/16 f/u lbp radiating to R leg hep Lidoderm. 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 Requ Lidoderm 11/7/18 f/u LBP L > R Lidoderm/Naprosyn 11/13/19: f/u LBP good relief with Lidoderm patch. Prostate issues and undergoing radiation treatment 2/12/2020: f/u lbp. 7/8/2020: f/u lbp. stable. 10/14/2020: f/u lbp. status quo 1/27/21 f/u lbp no changes 4/21/21: f/u lbp. no changes. 7/14/21: f/u lbp. a couple recent flare-ups, aggravated by damp weather. No new injury. Non radiating. Lidoderm patch and Naprosyn helped. He has been trying to ride recumbent bike. 10/13/21: f/u lbp. 4/13/22: f/u lbp. 3 weeks ago he was coughing when he "threw" his back out. He continues to have persistent back pain radiating down his left leg. He saw his Vascular Surgeon and was prescribed mobic. He does not feel it is helping.  5/18/22  f/u Lbp to L ant thigh 9/7/22: f/bullard lbp. to left leg. Saw pain management and had 3 DANNY's. Now feeling much better.  also now having increasing leg swelling.  12/7/22: f/u lbp. Doing ok. Had 3 DANNY's by pain management with good relief.  3/15/23: F/U LBP. He feels the same.  Siff/weather changes.  Uses patches 6/14/23: f/u lbp. Stable. Patches as needed. Hip pain persists 9/20/23: f/u lbp. stable patches as needed.  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

## 2024-03-07 ENCOUNTER — RESULT REVIEW (OUTPATIENT)
Age: 79
End: 2024-03-07

## 2024-04-05 ENCOUNTER — APPOINTMENT (OUTPATIENT)
Dept: CT IMAGING | Facility: CLINIC | Age: 79
End: 2024-04-05
Payer: MEDICARE

## 2024-04-05 PROCEDURE — 74175 CTA ABDOMEN W/CONTRAST: CPT | Mod: MH

## 2024-04-09 NOTE — ASU PREOP CHECKLIST - SIDE RAILS UP
Incrementing Botox Units: By 0.5 Units Depressor Anguli Oris Units: 0 Show Topical Anesthesia: Yes Show Mentalis Units: No Detail Level: Detailed Comments: 1:1 glabella, HERNAN, brow lift, Platysmal bands, masseters, nasalis, upper/lower lip, chin, jelly roll, Levator labii\\nsuperioris alaeque nasi (LLSN)\\n2:1 forehead, crows Forehead Units: 10 Show Inventory Tab: Show Glabellar Complex Units: 15 Periorbital Skin Units: 24 Consent: Written consent obtained. Risks include but not limited to lid/brow ptosis, bruising, swelling, diplopia, temporary effect, incomplete chemical denervation. Post-Care Instructions: Patient instructed to not lie down for 4 hours and limit physical activity for 24 hours. done

## 2024-04-12 ENCOUNTER — APPOINTMENT (OUTPATIENT)
Dept: VASCULAR SURGERY | Facility: CLINIC | Age: 79
End: 2024-04-12
Payer: MEDICARE

## 2024-04-12 VITALS
DIASTOLIC BLOOD PRESSURE: 74 MMHG | HEART RATE: 81 BPM | SYSTOLIC BLOOD PRESSURE: 120 MMHG | WEIGHT: 233 LBS | BODY MASS INDEX: 29.9 KG/M2 | HEIGHT: 74 IN | OXYGEN SATURATION: 94 %

## 2024-04-12 DIAGNOSIS — I71.43 INFRARENAL ABDOMINAL AORTIC ANEURYSM, WITHOUT RUPTURE: ICD-10-CM

## 2024-04-12 PROCEDURE — 99214 OFFICE O/P EST MOD 30 MIN: CPT

## 2024-04-12 NOTE — HISTORY OF PRESENT ILLNESS
[FreeTextEntry1] : Patient is follow-up visit for abdominal aortic aneurysm.  Recent abdominal CTA showed 3.6 cm infrarenal abdominal aortic aneurysm which previously measured 3.4 cm 2 years ago.  He has also stable 1 cm renal artery aneurysm.  Patient denies any abdominal pain.  He underwent open heart surgery 4 months ago.

## 2024-04-12 NOTE — PHYSICAL EXAM
[JVD] : no jugular venous distention  [Carotid Bruits] : no carotid bruits [Normal Breath Sounds] : Normal breath sounds [Normal Heart Sounds] : normal heart sounds [Abdominal Aorta] : Normal abdominal aorta [2+] : left 2+ [1+] : left 1+ [Ankle Swelling (On Exam)] : not present [Varicose Veins Of Lower Extremities] : not present [] : not present [Abdomen Masses] : No abdominal masses [Abdomen Tenderness] : ~T ~M No abdominal tenderness [No Rash or Lesion] : No rash or lesion [Calm] : calm [de-identified] : Very pleasant,No acute distress

## 2024-04-12 NOTE — ASSESSMENT
[FreeTextEntry1] : I recommended 1 year follow-up with aortic sonogram.  I spent more than half an hour reviewing patient's study and with the patient.

## 2024-06-12 ENCOUNTER — APPOINTMENT (OUTPATIENT)
Dept: ORTHOPEDIC SURGERY | Facility: CLINIC | Age: 79
End: 2024-06-12

## 2024-06-12 DIAGNOSIS — M43.10 SPONDYLOLISTHESIS, SITE UNSPECIFIED: ICD-10-CM

## 2024-06-12 DIAGNOSIS — M43.16 SPONDYLOLISTHESIS, LUMBAR REGION: ICD-10-CM

## 2024-06-19 ENCOUNTER — APPOINTMENT (OUTPATIENT)
Dept: ULTRASOUND IMAGING | Facility: CLINIC | Age: 79
End: 2024-06-19
Payer: MEDICARE

## 2024-06-19 PROCEDURE — 76775 US EXAM ABDO BACK WALL LIM: CPT

## 2024-06-24 ENCOUNTER — APPOINTMENT (OUTPATIENT)
Age: 79
End: 2024-06-24

## 2024-06-24 DIAGNOSIS — M54.41 LUMBAGO WITH SCIATICA, RIGHT SIDE: ICD-10-CM

## 2024-06-24 PROCEDURE — 97810 ACUP 1/> WO ESTIM 1ST 15 MIN: CPT

## 2024-06-24 PROCEDURE — 97811 ACUP 1/> W/O ESTIM EA ADD 15: CPT

## 2024-06-26 ENCOUNTER — APPOINTMENT (OUTPATIENT)
Dept: ORTHOPEDIC SURGERY | Facility: CLINIC | Age: 79
End: 2024-06-26
Payer: OTHER MISCELLANEOUS

## 2024-06-26 VITALS — BODY MASS INDEX: 29.9 KG/M2 | HEIGHT: 74 IN | WEIGHT: 233 LBS

## 2024-06-26 DIAGNOSIS — Z87.440 PERSONAL HISTORY OF URINARY (TRACT) INFECTIONS: ICD-10-CM

## 2024-06-26 DIAGNOSIS — M54.50 LOW BACK PAIN, UNSPECIFIED: ICD-10-CM

## 2024-06-26 DIAGNOSIS — M54.16 RADICULOPATHY, LUMBAR REGION: ICD-10-CM

## 2024-06-26 PROCEDURE — 72100 X-RAY EXAM L-S SPINE 2/3 VWS: CPT

## 2024-06-26 PROCEDURE — 99214 OFFICE O/P EST MOD 30 MIN: CPT

## 2024-06-26 RX ORDER — METHOCARBAMOL 500 MG/1
500 TABLET, FILM COATED ORAL
Refills: 0 | Status: ACTIVE | COMMUNITY

## 2024-06-26 RX ORDER — DULOXETINE HYDROCHLORIDE 30 MG/1
30 CAPSULE, DELAYED RELEASE PELLETS ORAL
Refills: 0 | Status: ACTIVE | COMMUNITY

## 2024-06-26 RX ORDER — TIZANIDINE HYDROCHLORIDE 2 MG/1
2 CAPSULE ORAL
Refills: 0 | Status: ACTIVE | COMMUNITY

## 2024-07-03 ENCOUNTER — APPOINTMENT (OUTPATIENT)
Age: 79
End: 2024-07-03

## 2024-07-10 ENCOUNTER — APPOINTMENT (OUTPATIENT)
Dept: ORTHOPEDIC SURGERY | Facility: CLINIC | Age: 79
End: 2024-07-10

## 2024-07-10 VITALS — HEIGHT: 74 IN | WEIGHT: 233 LBS | BODY MASS INDEX: 29.9 KG/M2

## 2024-07-10 DIAGNOSIS — M54.16 RADICULOPATHY, LUMBAR REGION: ICD-10-CM

## 2024-07-10 DIAGNOSIS — M47.816 SPONDYLOSIS W/OUT MYELOPATHY OR RADICULOPATHY, LUMBAR REGION: ICD-10-CM

## 2024-07-10 PROCEDURE — 99214 OFFICE O/P EST MOD 30 MIN: CPT

## 2024-07-19 ENCOUNTER — APPOINTMENT (OUTPATIENT)
Dept: ORTHOPEDIC SURGERY | Facility: CLINIC | Age: 79
End: 2024-07-19
Payer: OTHER MISCELLANEOUS

## 2024-07-19 VITALS — BODY MASS INDEX: 29.9 KG/M2 | WEIGHT: 233 LBS | HEIGHT: 74 IN

## 2024-07-19 DIAGNOSIS — M51.26 OTHER INTERVERTEBRAL DISC DISPLACEMENT, LUMBAR REGION: ICD-10-CM

## 2024-07-19 DIAGNOSIS — M43.16 SPONDYLOLISTHESIS, LUMBAR REGION: ICD-10-CM

## 2024-07-19 PROCEDURE — 99204 OFFICE O/P NEW MOD 45 MIN: CPT

## 2024-08-28 ENCOUNTER — APPOINTMENT (OUTPATIENT)
Dept: RADIOLOGY | Facility: CLINIC | Age: 79
End: 2024-08-28
Payer: MEDICARE

## 2024-08-28 PROCEDURE — 71046 X-RAY EXAM CHEST 2 VIEWS: CPT

## 2024-09-16 DIAGNOSIS — M43.16 SPONDYLOLISTHESIS, LUMBAR REGION: ICD-10-CM

## 2024-09-16 RX ORDER — OXYCODONE AND ACETAMINOPHEN 5; 325 MG/1; MG/1
5-325 TABLET ORAL EVERY 6 HOURS
Qty: 60 | Refills: 0 | Status: ACTIVE | COMMUNITY
Start: 2024-09-16 | End: 1900-01-01

## 2024-09-23 ENCOUNTER — APPOINTMENT (OUTPATIENT)
Dept: ORTHOPEDIC SURGERY | Facility: HOSPITAL | Age: 79
End: 2024-09-23
Payer: OTHER MISCELLANEOUS

## 2024-09-23 PROCEDURE — 63030 LAMOT DCMPRN NRV RT 1 LMBR: CPT | Mod: AS,50

## 2024-09-23 PROCEDURE — 63030 LAMOT DCMPRN NRV RT 1 LMBR: CPT | Mod: 50

## 2024-10-07 ENCOUNTER — APPOINTMENT (OUTPATIENT)
Dept: ORTHOPEDIC SURGERY | Facility: CLINIC | Age: 79
End: 2024-10-07
Payer: OTHER MISCELLANEOUS

## 2024-10-07 DIAGNOSIS — M51.26 OTHER INTERVERTEBRAL DISC DISPLACEMENT, LUMBAR REGION: ICD-10-CM

## 2024-10-07 PROCEDURE — ZZZZZ: CPT

## 2024-10-07 PROCEDURE — 99024 POSTOP FOLLOW-UP VISIT: CPT

## 2024-10-07 RX ORDER — TIZANIDINE 2 MG/1
2 TABLET ORAL EVERY 6 HOURS
Qty: 60 | Refills: 1 | Status: ACTIVE | COMMUNITY
Start: 2024-10-07 | End: 1900-01-01

## 2024-10-29 RX ORDER — OXYCODONE AND ACETAMINOPHEN 5; 325 MG/1; MG/1
5-325 TABLET ORAL EVERY 6 HOURS
Qty: 60 | Refills: 0 | Status: ACTIVE | COMMUNITY
Start: 2024-10-29 | End: 1900-01-01

## 2024-11-04 ENCOUNTER — APPOINTMENT (OUTPATIENT)
Dept: ORTHOPEDIC SURGERY | Facility: CLINIC | Age: 79
End: 2024-11-04
Payer: OTHER MISCELLANEOUS

## 2024-11-04 VITALS — BODY MASS INDEX: 29.9 KG/M2 | HEIGHT: 74 IN | WEIGHT: 233 LBS

## 2024-11-04 DIAGNOSIS — M54.16 RADICULOPATHY, LUMBAR REGION: ICD-10-CM

## 2024-11-04 PROCEDURE — 99024 POSTOP FOLLOW-UP VISIT: CPT

## 2024-11-04 RX ORDER — MELOXICAM 15 MG/1
15 TABLET ORAL DAILY
Qty: 30 | Refills: 1 | Status: ACTIVE | COMMUNITY
Start: 2024-11-04 | End: 1900-01-01

## 2024-11-04 RX ORDER — PREGABALIN 75 MG/1
75 CAPSULE ORAL TWICE DAILY
Qty: 60 | Refills: 1 | Status: ACTIVE | COMMUNITY
Start: 2024-11-04 | End: 1900-01-01

## 2024-11-06 ENCOUNTER — NON-APPOINTMENT (OUTPATIENT)
Age: 79
End: 2024-11-06

## 2024-11-25 ENCOUNTER — APPOINTMENT (OUTPATIENT)
Dept: ORTHOPEDIC SURGERY | Facility: CLINIC | Age: 79
End: 2024-11-25
Payer: OTHER MISCELLANEOUS

## 2024-11-25 VITALS — WEIGHT: 233 LBS | BODY MASS INDEX: 29.9 KG/M2 | HEIGHT: 74 IN

## 2024-11-25 DIAGNOSIS — M54.16 RADICULOPATHY, LUMBAR REGION: ICD-10-CM

## 2024-11-25 DIAGNOSIS — M51.26 OTHER INTERVERTEBRAL DISC DISPLACEMENT, LUMBAR REGION: ICD-10-CM

## 2024-11-25 PROCEDURE — 99024 POSTOP FOLLOW-UP VISIT: CPT

## 2024-12-23 ENCOUNTER — APPOINTMENT (OUTPATIENT)
Dept: ORTHOPEDIC SURGERY | Facility: CLINIC | Age: 79
End: 2024-12-23

## 2024-12-23 VITALS — BODY MASS INDEX: 29.9 KG/M2 | HEIGHT: 74 IN | WEIGHT: 233 LBS

## 2024-12-23 DIAGNOSIS — M51.26 OTHER INTERVERTEBRAL DISC DISPLACEMENT, LUMBAR REGION: ICD-10-CM

## 2024-12-23 DIAGNOSIS — M43.16 SPONDYLOLISTHESIS, LUMBAR REGION: ICD-10-CM

## 2024-12-23 PROCEDURE — 99214 OFFICE O/P EST MOD 30 MIN: CPT

## 2024-12-23 RX ORDER — PREGABALIN 75 MG/1
75 CAPSULE ORAL TWICE DAILY
Qty: 60 | Refills: 1 | Status: ACTIVE | COMMUNITY
Start: 2024-12-23 | End: 1900-01-01

## 2025-02-10 ENCOUNTER — APPOINTMENT (OUTPATIENT)
Dept: ORTHOPEDIC SURGERY | Facility: CLINIC | Age: 80
End: 2025-02-10
Payer: OTHER MISCELLANEOUS

## 2025-02-10 VITALS — BODY MASS INDEX: 29.9 KG/M2 | HEIGHT: 74 IN | WEIGHT: 233 LBS

## 2025-02-10 DIAGNOSIS — M43.16 SPONDYLOLISTHESIS, LUMBAR REGION: ICD-10-CM

## 2025-02-10 DIAGNOSIS — M54.16 RADICULOPATHY, LUMBAR REGION: ICD-10-CM

## 2025-02-10 PROCEDURE — 99214 OFFICE O/P EST MOD 30 MIN: CPT

## 2025-04-01 ENCOUNTER — APPOINTMENT (OUTPATIENT)
Dept: VASCULAR SURGERY | Facility: CLINIC | Age: 80
End: 2025-04-01

## 2025-04-01 VITALS — SYSTOLIC BLOOD PRESSURE: 128 MMHG | HEIGHT: 74 IN | DIASTOLIC BLOOD PRESSURE: 70 MMHG | OXYGEN SATURATION: 93 %

## 2025-04-01 DIAGNOSIS — Z87.440 PERSONAL HISTORY OF URINARY (TRACT) INFECTIONS: ICD-10-CM

## 2025-04-01 DIAGNOSIS — I71.43 INFRARENAL ABDOMINAL AORTIC ANEURYSM, WITHOUT RUPTURE: ICD-10-CM

## 2025-04-01 PROCEDURE — 93978 VASCULAR STUDY: CPT

## 2025-04-01 PROCEDURE — 99214 OFFICE O/P EST MOD 30 MIN: CPT

## 2025-04-07 ENCOUNTER — APPOINTMENT (OUTPATIENT)
Dept: ORTHOPEDIC SURGERY | Facility: CLINIC | Age: 80
End: 2025-04-07
Payer: OTHER MISCELLANEOUS

## 2025-04-07 VITALS — BODY MASS INDEX: 29.9 KG/M2 | WEIGHT: 233 LBS | HEIGHT: 74 IN

## 2025-04-07 DIAGNOSIS — M43.16 SPONDYLOLISTHESIS, LUMBAR REGION: ICD-10-CM

## 2025-04-07 DIAGNOSIS — M54.50 LOW BACK PAIN, UNSPECIFIED: ICD-10-CM

## 2025-04-07 PROCEDURE — 99214 OFFICE O/P EST MOD 30 MIN: CPT

## 2025-04-07 PROCEDURE — 72110 X-RAY EXAM L-2 SPINE 4/>VWS: CPT

## 2025-05-19 ENCOUNTER — APPOINTMENT (OUTPATIENT)
Dept: ORTHOPEDIC SURGERY | Facility: CLINIC | Age: 80
End: 2025-05-19
Payer: OTHER MISCELLANEOUS

## 2025-05-19 VITALS — WEIGHT: 233 LBS | BODY MASS INDEX: 29.9 KG/M2 | HEIGHT: 74 IN

## 2025-05-19 DIAGNOSIS — M54.16 RADICULOPATHY, LUMBAR REGION: ICD-10-CM

## 2025-05-19 DIAGNOSIS — M51.26 OTHER INTERVERTEBRAL DISC DISPLACEMENT, LUMBAR REGION: ICD-10-CM

## 2025-05-19 DIAGNOSIS — M43.16 SPONDYLOLISTHESIS, LUMBAR REGION: ICD-10-CM

## 2025-05-19 DIAGNOSIS — M47.816 SPONDYLOSIS W/OUT MYELOPATHY OR RADICULOPATHY, LUMBAR REGION: ICD-10-CM

## 2025-05-19 PROCEDURE — 99213 OFFICE O/P EST LOW 20 MIN: CPT

## 2025-06-24 ENCOUNTER — APPOINTMENT (OUTPATIENT)
Dept: PAIN MANAGEMENT | Facility: CLINIC | Age: 80
End: 2025-06-24
Payer: OTHER MISCELLANEOUS

## 2025-06-24 VITALS — BODY MASS INDEX: 29.9 KG/M2 | WEIGHT: 233 LBS | HEIGHT: 74 IN

## 2025-06-24 PROCEDURE — 99204 OFFICE O/P NEW MOD 45 MIN: CPT

## 2025-06-24 RX ORDER — GABAPENTIN 300 MG/1
300 CAPSULE ORAL 3 TIMES DAILY
Qty: 90 | Refills: 0 | Status: ACTIVE | COMMUNITY
Start: 2025-06-24 | End: 1900-01-01

## 2025-07-07 ENCOUNTER — APPOINTMENT (OUTPATIENT)
Dept: ORTHOPEDIC SURGERY | Facility: CLINIC | Age: 80
End: 2025-07-07
Payer: OTHER MISCELLANEOUS

## 2025-07-07 PROCEDURE — 99214 OFFICE O/P EST MOD 30 MIN: CPT

## 2025-07-07 RX ORDER — OXYCODONE AND ACETAMINOPHEN 5; 325 MG/1; MG/1
5-325 TABLET ORAL EVERY 6 HOURS
Qty: 60 | Refills: 0 | Status: ACTIVE | COMMUNITY
Start: 2025-07-07 | End: 1900-01-01

## 2025-07-15 ENCOUNTER — RX RENEWAL (OUTPATIENT)
Age: 80
End: 2025-07-15

## 2025-08-04 ENCOUNTER — APPOINTMENT (OUTPATIENT)
Dept: ULTRASOUND IMAGING | Facility: CLINIC | Age: 80
End: 2025-08-04
Payer: MEDICARE

## 2025-08-04 PROCEDURE — 76770 US EXAM ABDO BACK WALL COMP: CPT | Mod: 26

## 2025-08-15 ENCOUNTER — APPOINTMENT (OUTPATIENT)
Dept: ORTHOPEDIC SURGERY | Facility: CLINIC | Age: 80
End: 2025-08-15
Payer: OTHER MISCELLANEOUS

## 2025-08-15 VITALS — WEIGHT: 233 LBS | BODY MASS INDEX: 29.9 KG/M2 | HEIGHT: 74 IN

## 2025-08-15 DIAGNOSIS — M51.26 OTHER INTERVERTEBRAL DISC DISPLACEMENT, LUMBAR REGION: ICD-10-CM

## 2025-08-15 PROCEDURE — 99214 OFFICE O/P EST MOD 30 MIN: CPT

## 2025-08-15 RX ORDER — OXYCODONE AND ACETAMINOPHEN 5; 325 MG/1; MG/1
5-325 TABLET ORAL EVERY 6 HOURS
Qty: 60 | Refills: 0 | Status: ACTIVE | COMMUNITY
Start: 2025-08-15 | End: 1900-01-01

## 2025-08-15 RX ORDER — GABAPENTIN 300 MG/1
300 CAPSULE ORAL 3 TIMES DAILY
Qty: 90 | Refills: 0 | Status: ACTIVE | COMMUNITY
Start: 2025-08-15 | End: 1900-01-01

## 2025-08-18 ENCOUNTER — NON-APPOINTMENT (OUTPATIENT)
Age: 80
End: 2025-08-18

## 2025-08-18 ENCOUNTER — APPOINTMENT (OUTPATIENT)
Dept: PAIN MANAGEMENT | Facility: CLINIC | Age: 80
End: 2025-08-18
Payer: OTHER MISCELLANEOUS

## 2025-08-18 DIAGNOSIS — M47.816 SPONDYLOSIS W/OUT MYELOPATHY OR RADICULOPATHY, LUMBAR REGION: ICD-10-CM

## 2025-08-18 PROCEDURE — 64494 INJ PARAVERT F JNT L/S 2 LEV: CPT | Mod: 50,59

## 2025-08-18 PROCEDURE — J3490M: CUSTOM

## 2025-08-18 PROCEDURE — 64493 INJ PARAVERT F JNT L/S 1 LEV: CPT | Mod: 50

## 2025-08-21 ENCOUNTER — APPOINTMENT (OUTPATIENT)
Dept: PAIN MANAGEMENT | Facility: CLINIC | Age: 80
End: 2025-08-21
Payer: OTHER MISCELLANEOUS

## 2025-08-21 VITALS — HEIGHT: 74 IN | WEIGHT: 233 LBS | BODY MASS INDEX: 29.9 KG/M2

## 2025-08-21 DIAGNOSIS — M54.16 RADICULOPATHY, LUMBAR REGION: ICD-10-CM

## 2025-08-21 PROCEDURE — 99214 OFFICE O/P EST MOD 30 MIN: CPT

## 2025-08-21 RX ORDER — METHOCARBAMOL 500 MG/1
500 TABLET, FILM COATED ORAL 3 TIMES DAILY
Qty: 90 | Refills: 0 | Status: ACTIVE | COMMUNITY
Start: 2025-08-21 | End: 1900-01-01

## 2025-08-21 RX ORDER — PREGABALIN 75 MG/1
75 CAPSULE ORAL
Qty: 60 | Refills: 0 | Status: ACTIVE | COMMUNITY
Start: 2025-08-21 | End: 1900-01-01

## 2025-08-27 LAB — COMPREHENSIVE SCREEN URINE: NORMAL

## 2025-08-28 ENCOUNTER — OFFICE (OUTPATIENT)
Dept: URBAN - METROPOLITAN AREA CLINIC 38 | Facility: CLINIC | Age: 80
Setting detail: OPHTHALMOLOGY
End: 2025-08-28
Payer: MEDICARE

## 2025-08-28 DIAGNOSIS — H01.004: ICD-10-CM

## 2025-08-28 DIAGNOSIS — H01.005: ICD-10-CM

## 2025-08-28 DIAGNOSIS — H01.002: ICD-10-CM

## 2025-08-28 DIAGNOSIS — H01.001: ICD-10-CM

## 2025-08-28 PROBLEM — H00.12 CHALAZION; RIGHT LOWER LID: Status: ACTIVE | Noted: 2025-08-28

## 2025-08-28 PROBLEM — H40.1111 POAG; RIGHT EYE MILD, LEFT EYE MODERATE: Status: ACTIVE | Noted: 2025-08-28

## 2025-08-28 PROBLEM — H40.1122 POAG; RIGHT EYE MILD, LEFT EYE MODERATE: Status: ACTIVE | Noted: 2025-08-28

## 2025-08-28 PROCEDURE — 99203 OFFICE O/P NEW LOW 30 MIN: CPT | Performed by: OPTOMETRIST

## 2025-08-28 ASSESSMENT — LID EXAM ASSESSMENTS
OD_BLEPHARITIS: RLL RUL 3+
OS_BLEPHARITIS: LLL LUL 3+

## 2025-08-28 ASSESSMENT — CONFRONTATIONAL VISUAL FIELD TEST (CVF)
OS_FINDINGS: FULL
OD_FINDINGS: FULL

## 2025-08-28 ASSESSMENT — REFRACTION_CURRENTRX
OS_CYLINDER: -3.25
OS_ADD: +2.75
OS_OVR_VA: 20/
OS_SPHERE: +2.00
OD_VPRISM_DIRECTION: PROGS
OD_SPHERE: +3.00
OS_AXIS: 075
OS_VPRISM_DIRECTION: PROGS
OD_ADD: +2.75
OD_AXIS: 090
OD_CYLINDER: -5.00
OD_OVR_VA: 20/

## 2025-08-28 ASSESSMENT — KERATOMETRY
METHOD_AUTO_MANUAL: AUTO
OD_K1POWER_DIOPTERS: 36.00
OS_AXISANGLE_DEGREES: 162
OD_K2POWER_DIOPTERS: 42.25
OD_AXISANGLE_DEGREES: 008
OS_K1POWER_DIOPTERS: 37.50
OS_K2POWER_DIOPTERS: 40.75

## 2025-08-28 ASSESSMENT — REFRACTION_MANIFEST
OD_AXIS: 100
OD_CYLINDER: -5.75
OS_SPHERE: +2.00
OD_AXIS: 100
OD_VA1: 20/25-2
OS_AXIS: 075
OS_AXIS: 075
OS_SPHERE: +1.50
OS_ADD: +2.75
OD_ADD: +2.75
OD_SPHERE: +3.25
OS_CYLINDER: -4.00
OS_CYLINDER: -3.25
OD_SPHERE: +3.25
OD_VA1: 20/NI
OS_VA1: 20/20
OU_VA: 20/25-
OD_CYLINDER: -6.00
OS_VA1: 20/20

## 2025-08-28 ASSESSMENT — PACHYMETRY
OS_CT_UM: 525
OS_CT_CORRECTION: 1
OD_CT_UM: 517
OD_CT_CORRECTION: 2

## 2025-08-28 ASSESSMENT — DECREASING TEAR LAKE - SEVERITY SCORE
OS_DEC_TEARLAKE: 1+
OD_DEC_TEARLAKE: 1+

## 2025-08-28 ASSESSMENT — REFRACTION_AUTOREFRACTION
OS_AXIS: 075
OD_SPHERE: +3.75
OD_CYLINDER: -6.75
OS_CYLINDER: -4.00
OD_AXIS: 098
OS_SPHERE: +2.25

## 2025-08-28 ASSESSMENT — VISUAL ACUITY
OD_BCVA: 20/20-
OS_BCVA: 20/20-

## 2025-08-28 ASSESSMENT — TONOMETRY: OD_IOP_MMHG: 18

## 2025-09-18 ENCOUNTER — APPOINTMENT (OUTPATIENT)
Dept: PAIN MANAGEMENT | Facility: CLINIC | Age: 80
End: 2025-09-18
Payer: OTHER MISCELLANEOUS

## 2025-09-18 VITALS — HEIGHT: 74 IN | BODY MASS INDEX: 29.9 KG/M2 | WEIGHT: 233 LBS

## 2025-09-18 DIAGNOSIS — M54.16 RADICULOPATHY, LUMBAR REGION: ICD-10-CM

## 2025-09-18 PROCEDURE — 99214 OFFICE O/P EST MOD 30 MIN: CPT

## 2025-09-26 LAB — COMPREHENSIVE SCREEN URINE: NORMAL

## (undated) DEVICE — WARMING BLANKET DUO-THERM HYPER/HYPOTHERM ADULT

## (undated) DEVICE — DRSG MEPILEX 10 X 30CM (4 X 12") WHITE

## (undated) DEVICE — SUT ETHIBOND 2-0 36" SH

## (undated) DEVICE — POSITIONER FOAM EGG CRATE ULNAR 2PCS (PINK)

## (undated) DEVICE — DRAPE TOWEL WHITE 17" X 24"

## (undated) DEVICE — SUT SILK 4-0 30" RB-1

## (undated) DEVICE — SYNOVIS VASCULAR PROBE 1.5MM 15CM

## (undated) DEVICE — PACK OPEN HEART VAMP PLUS

## (undated) DEVICE — DRSG MEPILEX 10 X 10CM (4 X 4") AG

## (undated) DEVICE — SYR LUER LOK 20CC

## (undated) DEVICE — Device

## (undated) DEVICE — SOL ANTI FOG

## (undated) DEVICE — SUT PLEDGET 9MM X 4MM X 1.5MM

## (undated) DEVICE — DRAPE IOBAN 33" X 23"

## (undated) DEVICE — BEAVER BLADE MINI SHARP ALL ROUND (BLUE)

## (undated) DEVICE — SUT ETHIBOND 4-0 36" RB-1

## (undated) DEVICE — DRAPE SLUSH / WARMER 44 X 66"

## (undated) DEVICE — SUT PROLENE 4-0 36" SH

## (undated) DEVICE — ELCTR BOVIE PENCIL HANDPIECE ROCKER SWITCH 15FT

## (undated) DEVICE — SUT SILK 2-0 18" SH (POP-OFF)

## (undated) DEVICE — MARKING PEN W RULER

## (undated) DEVICE — PACK VALVE

## (undated) DEVICE — WOUND IRR IRRISEPT W 0.5 CHG

## (undated) DEVICE — GOWN TRIMAX LG

## (undated) DEVICE — SUT ETHIBOND 3-0 36" RB-1

## (undated) DEVICE — NDL COUNTER FOAM AND MAGNET 40-70

## (undated) DEVICE — BLOWER MISTER VIPER II

## (undated) DEVICE — SUT VICRYL 0 36" CTX UNDYED

## (undated) DEVICE — BLADE SURGICAL #15 CARBON

## (undated) DEVICE — SUT SILK 0 30" SH

## (undated) DEVICE — DRAPE TOWEL BLUE 17" X 24"

## (undated) DEVICE — PRESSURE INFUSOR BAG 1000ML

## (undated) DEVICE — SUT VICRYL 3-0 27" CT-1

## (undated) DEVICE — SUT PROLENE 7-0 24" BV175-6

## (undated) DEVICE — SUT SILK 0 30" TIES

## (undated) DEVICE — CONNECTOR CARDIAC 1:1 FOR HUBLESS DRAINS

## (undated) DEVICE — CATH IV SAFE BC 24G X 0.75" (YELLOW)

## (undated) DEVICE — SUT PROLENE 4-0 30" SH-1

## (undated) DEVICE — AORTIC PUNCH 4.0MM STANDARD HANDLE

## (undated) DEVICE — SUT ETHIBOND 1 30" OS6

## (undated) DEVICE — ELCTR MULTIFUNCTION DEFIBRILLATION ELECTRODE EDGE SYSTEM ADULT

## (undated) DEVICE — SUCTION TUBE CARDIAC SOFT TIP 6FR SHAFT 10FR TIP 6"

## (undated) DEVICE — GETINGE VASOVIEW 7 ENDOSCOPIC VESSEL HARVESTING SYSTEM

## (undated) DEVICE — STEALTH CLAMP INSERT FIBRA/FIBRA 90MM

## (undated) DEVICE — SUT DOUBLE 6 WIRE STERNAL

## (undated) DEVICE — PREP SCRUB BRUSH W CHG 4%

## (undated) DEVICE — BULLDOG SPRING CLIP LATIS/LATIS 6MM 1/2 FORCE (BLUE)

## (undated) DEVICE — DRSG TEGADERM 4X4.75"

## (undated) DEVICE — VESSEL LOOP MAXI-RED  0.120" X 16"

## (undated) DEVICE — STABILIZER HAND ASSISTANT ATTACHMENT W STABLESOFT 2S

## (undated) DEVICE — SUT PROLENE 4-0 36" RB-1

## (undated) DEVICE — SUT PROLENE 6-0 30" BV-1

## (undated) DEVICE — TUBING INSUFFLATION LAP FILTER 10FT

## (undated) DEVICE — SOL IRR POUR NS 0.9% 1000ML

## (undated) DEVICE — SUT ETHIBOND 2-0 4-30" RB-1 WHITE

## (undated) DEVICE — GLV 7 PROTEXIS (BLUE)

## (undated) DEVICE — STOPCOCK 3 WAY W SWIVEL MALE LUER LOCK

## (undated) DEVICE — GLV 7 PROTEXIS (WHITE)

## (undated) DEVICE — PREP CHLORAPREP HI-LITE ORANGE 26ML

## (undated) DEVICE — CHEST DRAIN PLEUR-EVAC DRY/WET ADULT-PEDS SINGLE (QUICK)

## (undated) DEVICE — SUT VICRYL 1 36" CTX UNDYED

## (undated) DEVICE — DRSG OPSITE 13.75 X 4"

## (undated) DEVICE — STAPLER SKIN PROXIMATE

## (undated) DEVICE — SOL INJ NS 0.9% 1000ML

## (undated) DEVICE — AORTIC PUNCH 4.4MM LONG HANDLE

## (undated) DEVICE — BULLDOG SPRING CLIP 6MM SOFT/SOFT

## (undated) DEVICE — SUT MONOCRYL 4-0 27" PS-2 UNDYED

## (undated) DEVICE — SUT SILK 5-0 60" TIES

## (undated) DEVICE — DRSG MEPILEX 10 X 25CM (4 X 10") POST OP AG SILVER